# Patient Record
Sex: MALE | Race: BLACK OR AFRICAN AMERICAN | Employment: OTHER | URBAN - METROPOLITAN AREA
[De-identification: names, ages, dates, MRNs, and addresses within clinical notes are randomized per-mention and may not be internally consistent; named-entity substitution may affect disease eponyms.]

---

## 2017-09-05 ENCOUNTER — OFFICE VISIT (OUTPATIENT)
Dept: ORTHOPEDIC SURGERY | Age: 67
End: 2017-09-05

## 2017-09-05 VITALS — WEIGHT: 263 LBS | HEIGHT: 74 IN | RESPIRATION RATE: 16 BRPM | BODY MASS INDEX: 33.75 KG/M2

## 2017-09-05 DIAGNOSIS — R52 PAIN: ICD-10-CM

## 2017-09-05 DIAGNOSIS — M54.2 CERVICALGIA: Primary | ICD-10-CM

## 2017-09-05 PROCEDURE — 99203 OFFICE O/P NEW LOW 30 MIN: CPT | Performed by: PHYSICIAN ASSISTANT

## 2017-09-05 ASSESSMENT — ENCOUNTER SYMPTOMS
BACK PAIN: 1
RESPIRATORY NEGATIVE: 1
EYES NEGATIVE: 1
GASTROINTESTINAL NEGATIVE: 1

## 2017-10-11 PROBLEM — K26.4 GASTROINTESTINAL HEMORRHAGE ASSOCIATED WITH DUODENAL ULCER: Status: ACTIVE | Noted: 2017-10-11

## 2017-11-27 NOTE — ANESTHESIA PRE-OP
Department of Anesthesiology  Preprocedure Note       Name:  Jackson Li   Age:  79 y.o.  :  1950                                          MRN:  [id number]         Date:  2017      Surgeon:    Procedure:    Medications prior to admission:   Prior to Admission medications    Medication Sig Start Date End Date Taking? Authorizing Provider   HYDROcodone-acetaminophen (NORCO) 5-325 MG per tablet Take 1 tablet by mouth every 6 hours as needed for Pain . 17   Bryce Wade MD   bicalutamide (CASODEX) 50 MG chemo tablet Take 1 tablet by mouth daily 11/15/17   Bryce Wade MD   cloNIDine (CATAPRES) 0.1 MG tablet Take 0.1 mg by mouth 2 times daily    Historical Provider, MD   insulin lispro (HUMALOG) 100 UNIT/ML injection vial Inject 10 Units into the skin 3 times daily (with meals)  Patient taking differently: Inject into the skin See Admin Instructions 17 Patient states he uses sliding scale regimen for all meals 10/12/17   Yang Costa MD   tamsulosin (FLOMAX) 0.4 MG capsule Take 1 capsule by mouth daily 10/12/17   Yang Costa MD   insulin glargine (LANTUS) 100 UNIT/ML injection vial Inject 30 Units into the skin nightly. Patient taking differently: Inject 40 Units into the skin nightly 17 Patient states he gave himself 40 units about 3 am 14   Edward Dsouza MD       Current medications:    Current Outpatient Prescriptions   Medication Sig Dispense Refill    HYDROcodone-acetaminophen (NORCO) 5-325 MG per tablet Take 1 tablet by mouth every 6 hours as needed for Pain .  30 tablet 0    bicalutamide (CASODEX) 50 MG chemo tablet Take 1 tablet by mouth daily 90 tablet 0    cloNIDine (CATAPRES) 0.1 MG tablet Take 0.1 mg by mouth 2 times daily      insulin lispro (HUMALOG) 100 UNIT/ML injection vial Inject 10 Units into the skin 3 times daily (with meals) (Patient taking differently: Inject into the skin See Admin Instructions 17 Patient states he uses sliding scale Dental:          Pulmonary:Negative Pulmonary ROS and normal exam                               Cardiovascular:    (+) hypertension:, CAD:,       ECG reviewed               Beta Blocker:  Not on Beta Blocker      ROS comment: Do not find Dx of CAD in chart. Neuro/Psych:               GI/Hepatic/Renal:   (+) PUD, renal disease: CRI,           Endo/Other:    (+) Type II DM, , .                 Abdominal:           Vascular:                                  Anesthesia Plan      MAC     ASA 3       Induction: intravenous. Anesthetic plan and risks discussed with patient.                     Uriel Gan DO   11/27/2017

## 2017-11-28 ENCOUNTER — HOSPITAL ENCOUNTER (OUTPATIENT)
Dept: SURGERY | Age: 67
Discharge: HOME | End: 2017-11-28
Attending: INTERNAL MEDICINE | Admitting: INTERNAL MEDICINE

## 2017-11-28 VITALS
RESPIRATION RATE: 16 BRPM | TEMPERATURE: 99.9 F | OXYGEN SATURATION: 99 % | WEIGHT: 243 LBS | DIASTOLIC BLOOD PRESSURE: 82 MMHG | BODY MASS INDEX: 31.18 KG/M2 | HEIGHT: 74 IN | HEART RATE: 87 BPM | SYSTOLIC BLOOD PRESSURE: 132 MMHG

## 2017-11-28 LAB — GLUCOSE BLD-MCNC: 110 MG/DL (ref 70–99)

## 2017-11-28 RX ORDER — SODIUM CHLORIDE, SODIUM LACTATE, POTASSIUM CHLORIDE, CALCIUM CHLORIDE 600; 310; 30; 20 MG/100ML; MG/100ML; MG/100ML; MG/100ML
INJECTION, SOLUTION INTRAVENOUS CONTINUOUS
Status: DISCONTINUED | OUTPATIENT
Start: 2017-11-28 | End: 2017-11-29 | Stop reason: HOSPADM

## 2017-11-28 RX ORDER — PANTOPRAZOLE SODIUM 40 MG/1
40 TABLET, DELAYED RELEASE ORAL DAILY
Qty: 90 TABLET | Refills: 3 | Status: SHIPPED | OUTPATIENT
Start: 2017-11-28 | End: 2018-09-19

## 2017-11-28 RX ADMIN — SODIUM CHLORIDE, SODIUM LACTATE, POTASSIUM CHLORIDE, CALCIUM CHLORIDE: 600; 310; 30; 20 INJECTION, SOLUTION INTRAVENOUS at 08:20

## 2017-11-28 ASSESSMENT — PAIN - FUNCTIONAL ASSESSMENT: PAIN_FUNCTIONAL_ASSESSMENT: 0-10

## 2017-11-28 ASSESSMENT — PAIN SCALES - GENERAL
PAINLEVEL_OUTOF10: 0
PAINLEVEL_OUTOF10: 0

## 2017-11-28 NOTE — ANESTHESIA POST-OP
Anesthesia Post-op Note    Patient: Shereen Griffiths  MRN: [id number]  YOB: 1950  Date of evaluation: 11/28/2017  Time:  10:23 AM     Procedure(s) Performed: egd    Last Vitals: /70   Pulse 88   Temp 99.9 °F (37.7 °C) (Temporal)   Resp 16   Ht 6' 2\" (1.88 m)   Wt 243 lb (110.2 kg)   SpO2 100%   BMI 31.20 kg/m²     Chon Phase I: Chon Score: 10    Chon Phase II: Chon Score: 10    Anesthesia Post Evaluation    Final anesthesia type: MAC  Patient location during evaluation: procedure area  Patient participation: complete - patient participated  Level of consciousness: awake and alert  Pain score: 0  Airway patency: patent  Nausea & Vomiting: no nausea and no vomiting  Complications: no  Cardiovascular status: blood pressure returned to baseline  Respiratory status: acceptable  Hydration status: euvolemic        German Rios CRNA  10:23 AM

## 2017-11-28 NOTE — H&P
I have examined the patient within 24 hours before the procedure and there is no change in the history and physical exam  recorded by me previously. I have reviewed with the patient and/or family the risks, benefits, and alternatives to the procedure.     Suraj Manuel MD  [facility] gastroenterology  11/28/2017  8:10 AM

## 2017-11-28 NOTE — OP NOTE
Full Notes in [facility]    EGD esophagus normal  Stomach Mild pan gastritis  Biopsy done  Duodenum nodular duodenitis  Biopsy done    Plan:  PPI once a day  FU as OP in 2 weeks

## 2018-01-10 ENCOUNTER — HOSPITAL ENCOUNTER (OUTPATIENT)
Dept: OTHER | Age: 68
Discharge: HOME | End: 2018-01-10
Attending: INTERNAL MEDICINE | Admitting: INTERNAL MEDICINE

## 2018-01-10 PROBLEM — E87.5 HYPERKALEMIA: Status: ACTIVE | Noted: 2018-01-10

## 2018-01-10 LAB
ALBUMIN SERPL-MCNC: 3.8 GM/DL (ref 3.4–5)
ALP BLD-CCNC: 226 IU/L (ref 40–128)
ALT SERPL-CCNC: 11 U/L (ref 10–40)
ANION GAP SERPL CALCULATED.3IONS-SCNC: 15 MMOL/L (ref 4–16)
AST SERPL-CCNC: 15 IU/L (ref 15–37)
BILIRUB SERPL-MCNC: 0.3 MG/DL (ref 0–1)
BUN BLDV-MCNC: 39 MG/DL (ref 6–23)
CALCIUM SERPL-MCNC: 8.8 MG/DL (ref 8.3–10.6)
CHLORIDE BLD-SCNC: 107 MMOL/L (ref 99–110)
CO2: 20 MMOL/L (ref 21–32)
CREAT SERPL-MCNC: 3 MG/DL (ref 0.9–1.3)
GFR AFRICAN AMERICAN: 25 ML/MIN/1.73M2
GFR NON-AFRICAN AMERICAN: 21 ML/MIN/1.73M2
GLUCOSE BLD-MCNC: 89 MG/DL (ref 70–99)
POTASSIUM SERPL-SCNC: 6.6 MMOL/L (ref 3.5–5.1)
SODIUM BLD-SCNC: 142 MMOL/L (ref 135–145)
TOTAL PROTEIN: 6.8 GM/DL (ref 6.4–8.2)

## 2018-01-11 ENCOUNTER — HOSPITAL ENCOUNTER (OUTPATIENT)
Dept: INFUSION THERAPY | Age: 68
Discharge: HOME | End: 2018-02-09
Attending: INTERNAL MEDICINE | Admitting: INTERNAL MEDICINE

## 2018-01-11 PROBLEM — E43 SEVERE MALNUTRITION (HCC): Chronic | Status: ACTIVE | Noted: 2018-01-11

## 2018-01-11 RX ORDER — METHYLPREDNISOLONE SODIUM SUCCINATE 40 MG/ML
20 INJECTION, POWDER, LYOPHILIZED, FOR SOLUTION INTRAMUSCULAR; INTRAVENOUS ONCE
Status: DISCONTINUED | OUTPATIENT
Start: 2018-01-11 | End: 2018-01-11

## 2018-01-11 RX ORDER — SODIUM CHLORIDE 0.9 % (FLUSH) 0.9 %
10 SYRINGE (ML) INJECTION PRN
Status: DISCONTINUED | OUTPATIENT
Start: 2018-01-11 | End: 2018-01-11

## 2018-01-11 RX ORDER — ACETAMINOPHEN 325 MG/1
650 TABLET ORAL SEE ADMIN INSTRUCTIONS
Status: DISCONTINUED | OUTPATIENT
Start: 2018-01-11 | End: 2018-01-11

## 2018-01-11 RX ORDER — HEPARIN SODIUM (PORCINE) LOCK FLUSH IV SOLN 100 UNIT/ML 100 UNIT/ML
500 SOLUTION INTRAVENOUS PRN
Status: DISCONTINUED | OUTPATIENT
Start: 2018-01-11 | End: 2018-01-11

## 2018-01-11 RX ORDER — 0.9 % SODIUM CHLORIDE 0.9 %
250 INTRAVENOUS SOLUTION INTRAVENOUS ONCE
Status: DISCONTINUED | OUTPATIENT
Start: 2018-01-11 | End: 2018-01-11

## 2018-01-11 RX ORDER — DIPHENHYDRAMINE HYDROCHLORIDE 50 MG/ML
25 INJECTION INTRAMUSCULAR; INTRAVENOUS SEE ADMIN INSTRUCTIONS
Status: DISCONTINUED | OUTPATIENT
Start: 2018-01-11 | End: 2018-01-11

## 2018-01-31 ENCOUNTER — HOSPITAL ENCOUNTER (OUTPATIENT)
Dept: OTHER | Age: 68
Discharge: HOME | End: 2018-01-31
Attending: INTERNAL MEDICINE | Admitting: INTERNAL MEDICINE

## 2018-01-31 LAB
ALBUMIN SERPL-MCNC: 4.1 GM/DL (ref 3.4–5)
ALP BLD-CCNC: 214 IU/L (ref 40–129)
ALT SERPL-CCNC: 8 U/L (ref 10–40)
ANION GAP SERPL CALCULATED.3IONS-SCNC: 14 MMOL/L (ref 4–16)
AST SERPL-CCNC: 15 IU/L (ref 15–37)
BILIRUB SERPL-MCNC: 0.4 MG/DL (ref 0–1)
BUN BLDV-MCNC: 45 MG/DL (ref 6–23)
CALCIUM SERPL-MCNC: 9.2 MG/DL (ref 8.3–10.6)
CHLORIDE BLD-SCNC: 107 MMOL/L (ref 99–110)
CO2: 22 MMOL/L (ref 21–32)
CREAT SERPL-MCNC: 2.4 MG/DL (ref 0.9–1.3)
GFR AFRICAN AMERICAN: 33 ML/MIN/1.73M2
GFR NON-AFRICAN AMERICAN: 27 ML/MIN/1.73M2
GLUCOSE BLD-MCNC: 128 MG/DL (ref 70–99)
POTASSIUM SERPL-SCNC: 4.9 MMOL/L (ref 3.5–5.1)
SODIUM BLD-SCNC: 143 MMOL/L (ref 135–145)
TOTAL PROTEIN: 7.3 GM/DL (ref 6.4–8.2)

## 2018-02-06 ENCOUNTER — HOSPITAL ENCOUNTER (OUTPATIENT)
Dept: OTHER | Age: 68
Discharge: HOME | End: 2018-02-06
Attending: NURSE PRACTITIONER | Admitting: NURSE PRACTITIONER

## 2018-02-06 LAB
ALBUMIN SERPL-MCNC: 4 GM/DL (ref 3.4–5)
ALP BLD-CCNC: 203 IU/L (ref 40–129)
ALT SERPL-CCNC: 13 U/L (ref 10–40)
ANION GAP SERPL CALCULATED.3IONS-SCNC: 14 MMOL/L (ref 4–16)
AST SERPL-CCNC: 20 IU/L (ref 15–37)
BILIRUB SERPL-MCNC: 0.7 MG/DL (ref 0–1)
BUN BLDV-MCNC: 43 MG/DL (ref 6–23)
CALCIUM SERPL-MCNC: 8.9 MG/DL (ref 8.3–10.6)
CHLORIDE BLD-SCNC: 107 MMOL/L (ref 99–110)
CO2: 23 MMOL/L (ref 21–32)
CREAT SERPL-MCNC: 2.6 MG/DL (ref 0.9–1.3)
GFR AFRICAN AMERICAN: 30 ML/MIN/1.73M2
GFR NON-AFRICAN AMERICAN: 25 ML/MIN/1.73M2
GLUCOSE BLD-MCNC: 113 MG/DL (ref 70–99)
POTASSIUM SERPL-SCNC: 5 MMOL/L (ref 3.5–5.1)
SODIUM BLD-SCNC: 144 MMOL/L (ref 135–145)
TOTAL PROTEIN: 7.2 GM/DL (ref 6.4–8.2)

## 2018-02-14 PROBLEM — N18.9 ANEMIA IN CHRONIC KIDNEY DISEASE: Status: ACTIVE | Noted: 2018-02-14

## 2018-02-14 PROBLEM — D63.1 ANEMIA IN CHRONIC KIDNEY DISEASE: Status: ACTIVE | Noted: 2018-02-14

## 2018-02-20 ENCOUNTER — HOSPITAL ENCOUNTER (OUTPATIENT)
Dept: GENERAL RADIOLOGY | Age: 68
Discharge: HOME | End: 2018-02-20
Attending: NURSE PRACTITIONER | Admitting: NURSE PRACTITIONER

## 2018-02-20 ENCOUNTER — HOSPITAL ENCOUNTER (OUTPATIENT)
Dept: CT IMAGING | Age: 68
Discharge: HOME | End: 2018-02-20
Attending: INTERNAL MEDICINE | Admitting: INTERNAL MEDICINE

## 2018-02-20 DIAGNOSIS — C61 PROSTATE CA (HCC): ICD-10-CM

## 2018-02-20 DIAGNOSIS — C79.52 SECONDARY MALIGNANT NEOPLASM OF BONE AND BONE MARROW (HCC): ICD-10-CM

## 2018-02-20 DIAGNOSIS — C79.51 SECONDARY MALIGNANT NEOPLASM OF BONE AND BONE MARROW (HCC): ICD-10-CM

## 2018-02-20 DIAGNOSIS — C61 MALIGNANT NEOPLASM OF PROSTATE (HCC): ICD-10-CM

## 2018-02-20 LAB
ALBUMIN SERPL-MCNC: 3.9 GM/DL (ref 3.4–5)
ALP BLD-CCNC: 193 IU/L (ref 40–129)
ALT SERPL-CCNC: 13 U/L (ref 10–40)
ANION GAP SERPL CALCULATED.3IONS-SCNC: 12 MMOL/L (ref 4–16)
AST SERPL-CCNC: 21 IU/L (ref 15–37)
BASOPHILS ABSOLUTE: 0.1 K/CU MM
BASOPHILS RELATIVE PERCENT: 0.5 % (ref 0–1)
BILIRUB SERPL-MCNC: 0.4 MG/DL (ref 0–1)
BUN BLDV-MCNC: 36 MG/DL (ref 6–23)
CALCIUM SERPL-MCNC: 9 MG/DL (ref 8.3–10.6)
CHLORIDE BLD-SCNC: 105 MMOL/L (ref 99–110)
CO2: 25 MMOL/L (ref 21–32)
CREAT SERPL-MCNC: 2.2 MG/DL (ref 0.9–1.3)
DIFFERENTIAL TYPE: ABNORMAL
EOSINOPHILS ABSOLUTE: 0 K/CU MM
EOSINOPHILS RELATIVE PERCENT: 0.2 % (ref 0–3)
GFR AFRICAN AMERICAN: 36 ML/MIN/1.73M2
GFR NON-AFRICAN AMERICAN: 30 ML/MIN/1.73M2
GLUCOSE FASTING: 105 MG/DL (ref 70–99)
HCT VFR BLD CALC: 32.5 % (ref 42–52)
HEMOGLOBIN: 10 GM/DL (ref 13.5–18)
IMMATURE NEUTROPHIL %: 0.5 % (ref 0–0.43)
INR BLD: 1.13 INDEX
LYMPHOCYTES ABSOLUTE: 1.2 K/CU MM
LYMPHOCYTES RELATIVE PERCENT: 12.6 % (ref 24–44)
MCH RBC QN AUTO: 30.1 PG (ref 27–31)
MCHC RBC AUTO-ENTMCNC: 30.8 % (ref 32–36)
MCV RBC AUTO: 97.9 FL (ref 78–100)
MONOCYTES ABSOLUTE: 0.5 K/CU MM
MONOCYTES RELATIVE PERCENT: 5.6 % (ref 0–4)
NUCLEATED RBC %: 0.3 %
PDW BLD-RTO: 16.9 % (ref 11.7–14.9)
PLATELET # BLD: 265 K/CU MM (ref 140–440)
PMV BLD AUTO: 9.7 FL (ref 7.5–11.1)
POTASSIUM SERPL-SCNC: 5 MMOL/L (ref 3.5–5.1)
PROTHROMBIN TIME: 12.9 SECONDS (ref 9.12–12.5)
RBC # BLD: 3.32 M/CU MM (ref 4.6–6.2)
SEGMENTED NEUTROPHILS ABSOLUTE COUNT: 7.8 K/CU MM
SEGMENTED NEUTROPHILS RELATIVE PERCENT: 80.6 % (ref 36–66)
SODIUM BLD-SCNC: 142 MMOL/L (ref 135–145)
TOTAL IMMATURE NEUTOROPHIL: 0.05 K/CU MM
TOTAL NUCLEATED RBC: 0 K/CU MM
TOTAL PROTEIN: 7 GM/DL (ref 6.4–8.2)
WBC # BLD: 9.7 K/CU MM (ref 4–10.5)

## 2018-02-21 ENCOUNTER — HOSPITAL ENCOUNTER (OUTPATIENT)
Dept: OTHER | Age: 68
Discharge: HOME | End: 2018-02-21
Attending: INTERNAL MEDICINE | Admitting: INTERNAL MEDICINE

## 2018-02-21 LAB — PSA ULTRASENSITIVE: 1.62 NG/ML (ref 0–4)

## 2018-03-20 ENCOUNTER — HOSPITAL ENCOUNTER (OUTPATIENT)
Dept: OTHER | Age: 68
Discharge: HOME | End: 2018-03-20
Attending: INTERNAL MEDICINE | Admitting: INTERNAL MEDICINE

## 2018-03-20 LAB
ALBUMIN SERPL-MCNC: 3.8 GM/DL (ref 3.4–5)
ALP BLD-CCNC: 275 IU/L (ref 40–128)
ALT SERPL-CCNC: 54 U/L (ref 10–40)
ANION GAP SERPL CALCULATED.3IONS-SCNC: 14 MMOL/L (ref 4–16)
AST SERPL-CCNC: 32 IU/L (ref 15–37)
BILIRUB SERPL-MCNC: 0.6 MG/DL (ref 0–1)
BUN BLDV-MCNC: 35 MG/DL (ref 6–23)
CALCIUM SERPL-MCNC: 8.9 MG/DL (ref 8.3–10.6)
CHLORIDE BLD-SCNC: 104 MMOL/L (ref 99–110)
CO2: 25 MMOL/L (ref 21–32)
CREAT SERPL-MCNC: 2.2 MG/DL (ref 0.9–1.3)
GFR AFRICAN AMERICAN: 36 ML/MIN/1.73M2
GFR NON-AFRICAN AMERICAN: 30 ML/MIN/1.73M2
GLUCOSE BLD-MCNC: 115 MG/DL (ref 70–99)
POTASSIUM SERPL-SCNC: 4.7 MMOL/L (ref 3.5–5.1)
SODIUM BLD-SCNC: 143 MMOL/L (ref 135–145)
TOTAL PROTEIN: 7.1 GM/DL (ref 6.4–8.2)

## 2018-04-11 PROBLEM — N18.30 CHRONIC KIDNEY DISEASE, STAGE III (MODERATE) (HCC): Status: ACTIVE | Noted: 2018-04-11

## 2018-04-11 PROBLEM — C61 PROSTATE CANCER (HCC): Status: ACTIVE | Noted: 2018-04-11

## 2018-05-15 ENCOUNTER — HOSPITAL ENCOUNTER (OUTPATIENT)
Dept: OTHER | Age: 68
Discharge: HOME | End: 2018-05-15
Attending: INTERNAL MEDICINE | Admitting: INTERNAL MEDICINE

## 2018-05-15 LAB
ALBUMIN SERPL-MCNC: 3.9 GM/DL (ref 3.4–5)
ALP BLD-CCNC: 180 IU/L (ref 40–129)
ALT SERPL-CCNC: 15 U/L (ref 10–40)
ANION GAP SERPL CALCULATED.3IONS-SCNC: 10 MMOL/L (ref 4–16)
AST SERPL-CCNC: 18 IU/L (ref 15–37)
BILIRUB SERPL-MCNC: 0.3 MG/DL (ref 0–1)
BUN BLDV-MCNC: 34 MG/DL (ref 6–23)
CALCIUM SERPL-MCNC: 8.8 MG/DL (ref 8.3–10.6)
CHLORIDE BLD-SCNC: 109 MMOL/L (ref 99–110)
CO2: 27 MMOL/L (ref 21–32)
CREAT SERPL-MCNC: 2.3 MG/DL (ref 0.9–1.3)
GFR AFRICAN AMERICAN: 34 ML/MIN/1.73M2
GFR NON-AFRICAN AMERICAN: 28 ML/MIN/1.73M2
GLUCOSE BLD-MCNC: 144 MG/DL (ref 70–99)
POTASSIUM SERPL-SCNC: 4 MMOL/L (ref 3.5–5.1)
SODIUM BLD-SCNC: 146 MMOL/L (ref 135–145)
TOTAL PROTEIN: 6.6 GM/DL (ref 6.4–8.2)

## 2018-06-20 ENCOUNTER — HOSPITAL ENCOUNTER (OUTPATIENT)
Dept: OTHER | Age: 68
Discharge: HOME | End: 2018-06-20
Attending: INTERNAL MEDICINE | Admitting: INTERNAL MEDICINE

## 2018-06-20 LAB
ALBUMIN SERPL-MCNC: 4 GM/DL (ref 3.4–5)
ALP BLD-CCNC: 164 IU/L (ref 40–129)
ALT SERPL-CCNC: 13 U/L (ref 10–40)
ANION GAP SERPL CALCULATED.3IONS-SCNC: 12 MMOL/L (ref 4–16)
AST SERPL-CCNC: 16 IU/L (ref 15–37)
BILIRUB SERPL-MCNC: 0.3 MG/DL (ref 0–1)
BUN BLDV-MCNC: 31 MG/DL (ref 6–23)
CALCIUM SERPL-MCNC: 8.9 MG/DL (ref 8.3–10.6)
CHLORIDE BLD-SCNC: 105 MMOL/L (ref 99–110)
CO2: 27 MMOL/L (ref 21–32)
CREAT SERPL-MCNC: 2.1 MG/DL (ref 0.9–1.3)
GFR AFRICAN AMERICAN: 38 ML/MIN/1.73M2
GFR NON-AFRICAN AMERICAN: 32 ML/MIN/1.73M2
GLUCOSE BLD-MCNC: 101 MG/DL (ref 70–99)
POTASSIUM SERPL-SCNC: 4.5 MMOL/L (ref 3.5–5.1)
SODIUM BLD-SCNC: 144 MMOL/L (ref 135–145)
TOTAL PROTEIN: 7.4 GM/DL (ref 6.4–8.2)

## 2018-06-22 LAB
PROSTATE SPECIFIC ANTIGEN FREE: <0.1
PROSTATE SPECIFIC ANTIGEN PERCENT FREE: <50
PROSTATE SPECIFIC ANTIGEN: 0.2

## 2018-08-21 ENCOUNTER — HOSPITAL ENCOUNTER (OUTPATIENT)
Dept: OTHER | Age: 68
Discharge: HOME | End: 2018-08-21
Attending: NURSE PRACTITIONER | Admitting: NURSE PRACTITIONER

## 2018-08-21 LAB
ALBUMIN SERPL-MCNC: 4.2 GM/DL (ref 3.4–5)
ALP BLD-CCNC: 156 IU/L (ref 40–128)
ALT SERPL-CCNC: 16 U/L (ref 10–40)
ANION GAP SERPL CALCULATED.3IONS-SCNC: 13 MMOL/L (ref 4–16)
AST SERPL-CCNC: 22 IU/L (ref 15–37)
BILIRUB SERPL-MCNC: 0.4 MG/DL (ref 0–1)
BUN BLDV-MCNC: 37 MG/DL (ref 6–23)
CALCIUM SERPL-MCNC: 9.4 MG/DL (ref 8.3–10.6)
CHLORIDE BLD-SCNC: 104 MMOL/L (ref 99–110)
CO2: 27 MMOL/L (ref 21–32)
CREAT SERPL-MCNC: 2.3 MG/DL (ref 0.9–1.3)
GFR AFRICAN AMERICAN: 34 ML/MIN/1.73M2
GFR NON-AFRICAN AMERICAN: 28 ML/MIN/1.73M2
GLUCOSE BLD-MCNC: 110 MG/DL (ref 70–99)
POTASSIUM SERPL-SCNC: 5.1 MMOL/L (ref 3.5–5.1)
SODIUM BLD-SCNC: 144 MMOL/L (ref 135–145)
TOTAL PROTEIN: 6.9 GM/DL (ref 6.4–8.2)

## 2018-08-23 LAB
PROSTATE SPECIFIC ANTIGEN FREE: <0.1
PROSTATE SPECIFIC ANTIGEN PERCENT FREE: <100
PROSTATE SPECIFIC ANTIGEN: 0.1

## 2018-09-05 ENCOUNTER — HOSPITAL ENCOUNTER (OUTPATIENT)
Dept: GENERAL RADIOLOGY | Age: 68
Discharge: HOME | End: 2018-09-05
Attending: NURSE PRACTITIONER | Admitting: NURSE PRACTITIONER

## 2018-09-05 DIAGNOSIS — C61 PROSTATE CANCER (HCC): ICD-10-CM

## 2018-09-05 DIAGNOSIS — M25.552 LEFT HIP PAIN: ICD-10-CM

## 2018-09-19 PROBLEM — I73.9 PVD (PERIPHERAL VASCULAR DISEASE) (HCC): Status: ACTIVE | Noted: 2018-09-19

## 2018-09-20 ENCOUNTER — HOSPITAL ENCOUNTER (OUTPATIENT)
Dept: OTHER | Age: 68
Discharge: HOME | End: 2018-09-20
Attending: INTERNAL MEDICINE | Admitting: INTERNAL MEDICINE

## 2018-09-20 LAB
ALBUMIN SERPL-MCNC: 3.9 GM/DL (ref 3.4–5)
ALP BLD-CCNC: 143 IU/L (ref 40–129)
ALT SERPL-CCNC: 12 U/L (ref 10–40)
ANION GAP SERPL CALCULATED.3IONS-SCNC: 10 MMOL/L (ref 4–16)
AST SERPL-CCNC: 17 IU/L (ref 15–37)
BILIRUB SERPL-MCNC: 0.4 MG/DL (ref 0–1)
BUN BLDV-MCNC: 28 MG/DL (ref 6–23)
CALCIUM SERPL-MCNC: 8.9 MG/DL (ref 8.3–10.6)
CHLORIDE BLD-SCNC: 104 MMOL/L (ref 99–110)
CO2: 28 MMOL/L (ref 21–32)
CREAT SERPL-MCNC: 2.2 MG/DL (ref 0.9–1.3)
GFR AFRICAN AMERICAN: 36 ML/MIN/1.73M2
GFR NON-AFRICAN AMERICAN: 30 ML/MIN/1.73M2
GLUCOSE BLD-MCNC: 146 MG/DL (ref 70–99)
POTASSIUM SERPL-SCNC: 4.4 MMOL/L (ref 3.5–5.1)
SODIUM BLD-SCNC: 142 MMOL/L (ref 135–145)
TOTAL PROTEIN: 7.1 GM/DL (ref 6.4–8.2)

## 2018-11-05 ENCOUNTER — HOSPITAL ENCOUNTER (OUTPATIENT)
Age: 68
Setting detail: SPECIMEN
Discharge: HOME | End: 2018-11-05
Payer: COMMERCIAL

## 2018-11-05 LAB
ALBUMIN SERPL-MCNC: 4.4 GM/DL (ref 3.4–5)
ALP BLD-CCNC: 133 IU/L (ref 40–128)
ALT SERPL-CCNC: 17 U/L (ref 10–40)
ANION GAP SERPL CALCULATED.3IONS-SCNC: 13 MMOL/L (ref 4–16)
AST SERPL-CCNC: 20 IU/L (ref 15–37)
BILIRUB SERPL-MCNC: 0.3 MG/DL (ref 0–1)
BUN BLDV-MCNC: 46 MG/DL (ref 6–23)
CALCIUM SERPL-MCNC: 9.4 MG/DL (ref 8.3–10.6)
CHLORIDE BLD-SCNC: 102 MMOL/L (ref 99–110)
CO2: 25 MMOL/L (ref 21–32)
CREAT SERPL-MCNC: 2.5 MG/DL (ref 0.9–1.3)
GFR AFRICAN AMERICAN: 31 ML/MIN/1.73M2
GFR NON-AFRICAN AMERICAN: 26 ML/MIN/1.73M2
GLUCOSE BLD-MCNC: 121 MG/DL (ref 70–99)
POTASSIUM SERPL-SCNC: 5.1 MMOL/L (ref 3.5–5.1)
SODIUM BLD-SCNC: 140 MMOL/L (ref 135–145)
TOTAL PROTEIN: 7.6 GM/DL (ref 6.4–8.2)

## 2018-11-05 PROCEDURE — 84154 ASSAY OF PSA FREE: CPT

## 2018-11-05 PROCEDURE — 84153 ASSAY OF PSA TOTAL: CPT

## 2018-11-05 PROCEDURE — 80053 COMPREHEN METABOLIC PANEL: CPT

## 2018-12-19 ENCOUNTER — HOSPITAL ENCOUNTER (OUTPATIENT)
Age: 68
Setting detail: SPECIMEN
Discharge: HOME | End: 2018-12-19
Payer: COMMERCIAL

## 2018-12-19 LAB
ALBUMIN SERPL-MCNC: 4 GM/DL (ref 3.4–5)
ALP BLD-CCNC: 133 IU/L (ref 40–129)
ALT SERPL-CCNC: 15 U/L (ref 10–40)
ANION GAP SERPL CALCULATED.3IONS-SCNC: 14 MMOL/L (ref 4–16)
AST SERPL-CCNC: 21 IU/L (ref 15–37)
BILIRUB SERPL-MCNC: 0.3 MG/DL (ref 0–1)
BUN BLDV-MCNC: 47 MG/DL (ref 6–23)
CALCIUM SERPL-MCNC: 8.9 MG/DL (ref 8.3–10.6)
CHLORIDE BLD-SCNC: 101 MMOL/L (ref 99–110)
CO2: 25 MMOL/L (ref 21–32)
CREAT SERPL-MCNC: 2.7 MG/DL (ref 0.9–1.3)
GFR AFRICAN AMERICAN: 29 ML/MIN/1.73M2
GFR NON-AFRICAN AMERICAN: 24 ML/MIN/1.73M2
GLUCOSE BLD-MCNC: 131 MG/DL (ref 70–99)
POTASSIUM SERPL-SCNC: 4.6 MMOL/L (ref 3.5–5.1)
PSA ULTRASENSITIVE: 0.04 NG/ML (ref 0–4)
SODIUM BLD-SCNC: 140 MMOL/L (ref 135–145)
TOTAL PROTEIN: 7.3 GM/DL (ref 6.4–8.2)

## 2018-12-19 PROCEDURE — 80053 COMPREHEN METABOLIC PANEL: CPT

## 2018-12-19 PROCEDURE — 84153 ASSAY OF PSA TOTAL: CPT

## 2019-01-08 LAB
A/G RATIO: 1.2 (ref 1.1–2.2)
ALBUMIN SERPL-MCNC: 4.1 G/DL (ref 3.4–5)
ALP BLD-CCNC: 123 U/L (ref 40–129)
ALT SERPL-CCNC: 13 U/L (ref 10–40)
ANION GAP SERPL CALCULATED.3IONS-SCNC: 15 MMOL/L (ref 3–16)
AST SERPL-CCNC: 19 U/L (ref 15–37)
BILIRUB SERPL-MCNC: <0.2 MG/DL (ref 0–1)
BUN BLDV-MCNC: 48 MG/DL (ref 7–20)
CALCIUM SERPL-MCNC: 10 MG/DL (ref 8.3–10.6)
CHLORIDE BLD-SCNC: 103 MMOL/L (ref 99–110)
CHOLESTEROL, TOTAL: 199 MG/DL (ref 0–199)
CO2: 24 MMOL/L (ref 21–32)
CREAT SERPL-MCNC: 2.7 MG/DL (ref 0.8–1.3)
ESTIMATED AVERAGE GLUCOSE: 114 MG/DL
GFR AFRICAN AMERICAN: 28
GFR NON-AFRICAN AMERICAN: 24
GLOBULIN: 3.4 G/DL
GLUCOSE BLD-MCNC: 189 MG/DL (ref 70–99)
HBA1C MFR BLD: 5.6 %
HDLC SERPL-MCNC: 45 MG/DL (ref 40–60)
LDL CHOLESTEROL CALCULATED: 131 MG/DL
POTASSIUM SERPL-SCNC: 5 MMOL/L (ref 3.5–5.1)
SODIUM BLD-SCNC: 142 MMOL/L (ref 136–145)
TOTAL PROTEIN: 7.5 G/DL (ref 6.4–8.2)
TRIGL SERPL-MCNC: 117 MG/DL (ref 0–150)
VLDLC SERPL CALC-MCNC: 23 MG/DL

## 2019-03-13 ENCOUNTER — HOSPITAL ENCOUNTER (OUTPATIENT)
Age: 69
Setting detail: SPECIMEN
Discharge: HOME | End: 2019-03-13
Payer: COMMERCIAL

## 2019-03-13 LAB
ALBUMIN SERPL-MCNC: 3.9 GM/DL (ref 3.4–5)
ALP BLD-CCNC: 98 IU/L (ref 40–128)
ALT SERPL-CCNC: 8 U/L (ref 10–40)
ANION GAP SERPL CALCULATED.3IONS-SCNC: 10 MMOL/L (ref 4–16)
AST SERPL-CCNC: 15 IU/L (ref 15–37)
BILIRUB SERPL-MCNC: 0.4 MG/DL (ref 0–1)
BUN BLDV-MCNC: 31 MG/DL (ref 6–23)
CALCIUM SERPL-MCNC: 9.7 MG/DL (ref 8.3–10.6)
CHLORIDE BLD-SCNC: 102 MMOL/L (ref 99–110)
CO2: 29 MMOL/L (ref 21–32)
CREAT SERPL-MCNC: 2.4 MG/DL (ref 0.9–1.3)
GFR AFRICAN AMERICAN: 33 ML/MIN/1.73M2
GFR NON-AFRICAN AMERICAN: 27 ML/MIN/1.73M2
GLUCOSE BLD-MCNC: 135 MG/DL (ref 70–99)
POTASSIUM SERPL-SCNC: 4.7 MMOL/L (ref 3.5–5.1)
SODIUM BLD-SCNC: 141 MMOL/L (ref 135–145)
TOTAL PROTEIN: 6.9 GM/DL (ref 6.4–8.2)

## 2019-03-13 PROCEDURE — 84154 ASSAY OF PSA FREE: CPT

## 2019-03-13 PROCEDURE — 84153 ASSAY OF PSA TOTAL: CPT

## 2019-03-13 PROCEDURE — 80053 COMPREHEN METABOLIC PANEL: CPT

## 2019-03-15 LAB
PROSTATE SPECIFIC ANTIGEN FREE: <0.1 NG/ML
PROSTATE SPECIFIC ANTIGEN PERCENT FREE: NORMAL %
PROSTATE SPECIFIC ANTIGEN PERCENT FREE: NORMAL %
PROSTATE SPECIFIC ANTIGEN: <0.1 NG/ML (ref 0–4)
PROSTATE SPECIFIC ANTIGEN: NORMAL NG/ML (ref 0–4)

## 2019-06-12 ENCOUNTER — HOSPITAL ENCOUNTER (OUTPATIENT)
Age: 69
Setting detail: SPECIMEN
Discharge: HOME | End: 2019-06-12
Payer: COMMERCIAL

## 2019-06-12 LAB
ALBUMIN SERPL-MCNC: 4.4 GM/DL (ref 3.4–5)
ALP BLD-CCNC: 130 IU/L (ref 40–128)
ALT SERPL-CCNC: 15 U/L (ref 10–40)
ANION GAP SERPL CALCULATED.3IONS-SCNC: 14 MMOL/L (ref 4–16)
AST SERPL-CCNC: 21 IU/L (ref 15–37)
BILIRUB SERPL-MCNC: 0.4 MG/DL (ref 0–1)
BUN BLDV-MCNC: 40 MG/DL (ref 6–23)
CALCIUM SERPL-MCNC: 10.3 MG/DL (ref 8.3–10.6)
CHLORIDE BLD-SCNC: 101 MMOL/L (ref 99–110)
CO2: 27 MMOL/L (ref 21–32)
CREAT SERPL-MCNC: 2.3 MG/DL (ref 0.9–1.3)
GFR AFRICAN AMERICAN: 34 ML/MIN/1.73M2
GFR NON-AFRICAN AMERICAN: 28 ML/MIN/1.73M2
GLUCOSE BLD-MCNC: 101 MG/DL (ref 70–99)
POTASSIUM SERPL-SCNC: 4.6 MMOL/L (ref 3.5–5.1)
SODIUM BLD-SCNC: 142 MMOL/L (ref 135–145)
TOTAL PROTEIN: 7.4 GM/DL (ref 6.4–8.2)

## 2019-06-12 PROCEDURE — 84153 ASSAY OF PSA TOTAL: CPT

## 2019-06-12 PROCEDURE — 80053 COMPREHEN METABOLIC PANEL: CPT

## 2019-06-12 PROCEDURE — 84154 ASSAY OF PSA FREE: CPT

## 2019-09-27 ENCOUNTER — HOSPITAL ENCOUNTER (OUTPATIENT)
Age: 69
Setting detail: SPECIMEN
Discharge: HOME | End: 2019-09-27
Payer: COMMERCIAL

## 2019-09-27 LAB
ALBUMIN SERPL-MCNC: 4.2 GM/DL (ref 3.4–5)
ALP BLD-CCNC: 143 IU/L (ref 40–128)
ALT SERPL-CCNC: 13 U/L (ref 10–40)
ANION GAP SERPL CALCULATED.3IONS-SCNC: 10 MMOL/L (ref 4–16)
AST SERPL-CCNC: 20 IU/L (ref 15–37)
BILIRUB SERPL-MCNC: 0.3 MG/DL (ref 0–1)
BUN BLDV-MCNC: 34 MG/DL (ref 6–23)
CALCIUM SERPL-MCNC: 9.2 MG/DL (ref 8.3–10.6)
CHLORIDE BLD-SCNC: 102 MMOL/L (ref 99–110)
CO2: 26 MMOL/L (ref 21–32)
CREAT SERPL-MCNC: 2.2 MG/DL (ref 0.9–1.3)
GFR AFRICAN AMERICAN: 36 ML/MIN/1.73M2
GFR NON-AFRICAN AMERICAN: 30 ML/MIN/1.73M2
GLUCOSE BLD-MCNC: 111 MG/DL (ref 70–99)
POTASSIUM SERPL-SCNC: 4.3 MMOL/L (ref 3.5–5.1)
SODIUM BLD-SCNC: 138 MMOL/L (ref 135–145)
TOTAL PROTEIN: 7.4 GM/DL (ref 6.4–8.2)

## 2019-09-27 PROCEDURE — 84154 ASSAY OF PSA FREE: CPT

## 2019-09-27 PROCEDURE — 84153 ASSAY OF PSA TOTAL: CPT

## 2019-09-27 PROCEDURE — 80053 COMPREHEN METABOLIC PANEL: CPT

## 2019-12-27 ENCOUNTER — HOSPITAL ENCOUNTER (OUTPATIENT)
Age: 69
Setting detail: SPECIMEN
Discharge: HOME | End: 2019-12-27
Payer: COMMERCIAL

## 2019-12-27 LAB
ALBUMIN SERPL-MCNC: 4.4 GM/DL (ref 3.4–5)
ALP BLD-CCNC: 125 IU/L (ref 40–129)
ALT SERPL-CCNC: 8 U/L (ref 10–40)
ANION GAP SERPL CALCULATED.3IONS-SCNC: 10 MMOL/L (ref 4–16)
AST SERPL-CCNC: 14 IU/L (ref 15–37)
BILIRUB SERPL-MCNC: 0.4 MG/DL (ref 0–1)
BUN BLDV-MCNC: 29 MG/DL (ref 6–23)
CALCIUM SERPL-MCNC: 9.5 MG/DL (ref 8.3–10.6)
CHLORIDE BLD-SCNC: 98 MMOL/L (ref 99–110)
CO2: 27 MMOL/L (ref 21–32)
CREAT SERPL-MCNC: 2 MG/DL (ref 0.9–1.3)
GFR AFRICAN AMERICAN: 40 ML/MIN/1.73M2
GFR NON-AFRICAN AMERICAN: 33 ML/MIN/1.73M2
GLUCOSE BLD-MCNC: 90 MG/DL (ref 70–99)
POTASSIUM SERPL-SCNC: 4.3 MMOL/L (ref 3.5–5.1)
SODIUM BLD-SCNC: 135 MMOL/L (ref 135–145)
TOTAL PROTEIN: 7.7 GM/DL (ref 6.4–8.2)

## 2019-12-27 PROCEDURE — 80053 COMPREHEN METABOLIC PANEL: CPT

## 2019-12-27 PROCEDURE — 84153 ASSAY OF PSA TOTAL: CPT

## 2019-12-27 PROCEDURE — 84154 ASSAY OF PSA FREE: CPT

## 2020-01-21 ENCOUNTER — HOSPITAL ENCOUNTER (OUTPATIENT)
Dept: INFUSION THERAPY | Age: 70
Discharge: HOME | End: 2020-01-21
Payer: COMMERCIAL

## 2020-01-21 LAB
ALBUMIN SERPL-MCNC: 4.3 GM/DL (ref 3.4–5)
ALP BLD-CCNC: 142 IU/L (ref 40–128)
ALT SERPL-CCNC: 13 U/L (ref 10–40)
ANION GAP SERPL CALCULATED.3IONS-SCNC: 12 MMOL/L (ref 4–16)
AST SERPL-CCNC: 19 IU/L (ref 15–37)
BILIRUB SERPL-MCNC: 0.4 MG/DL (ref 0–1)
BUN BLDV-MCNC: 36 MG/DL (ref 6–23)
CALCIUM SERPL-MCNC: 9.4 MG/DL (ref 8.3–10.6)
CHLORIDE BLD-SCNC: 101 MMOL/L (ref 99–110)
CO2: 26 MMOL/L (ref 21–32)
CREAT SERPL-MCNC: 2.2 MG/DL (ref 0.9–1.3)
DIFFERENTIAL TYPE: ABNORMAL
EOSINOPHILS ABSOLUTE: 0.4 K/CU MM
EOSINOPHILS RELATIVE PERCENT: 4 % (ref 0–3)
GFR AFRICAN AMERICAN: 36 ML/MIN/1.73M2
GFR NON-AFRICAN AMERICAN: 30 ML/MIN/1.73M2
GLUCOSE BLD-MCNC: 117 MG/DL (ref 70–99)
HCT VFR BLD CALC: 38.5 % (ref 42–52)
HEMOGLOBIN: 12.3 GM/DL (ref 13.5–18)
LYMPHOCYTES ABSOLUTE: 1.5 K/CU MM
LYMPHOCYTES RELATIVE PERCENT: 17 % (ref 24–44)
MCH RBC QN AUTO: 30.8 PG (ref 27–31)
MCHC RBC AUTO-ENTMCNC: 31.9 % (ref 32–36)
MCV RBC AUTO: 96.5 FL (ref 78–100)
MONOCYTES ABSOLUTE: 0.6 K/CU MM
MONOCYTES RELATIVE PERCENT: 7 % (ref 0–4)
PDW BLD-RTO: 12.5 % (ref 11.7–14.9)
PLATELET # BLD: 238 K/CU MM (ref 140–440)
PMV BLD AUTO: 9 FL (ref 7.5–11.1)
POTASSIUM SERPL-SCNC: 4.9 MMOL/L (ref 3.5–5.1)
RBC # BLD: 3.99 M/CU MM (ref 4.6–6.2)
SEGMENTED NEUTROPHILS ABSOLUTE COUNT: 6.3 K/CU MM
SEGMENTED NEUTROPHILS RELATIVE PERCENT: 72 % (ref 36–66)
SODIUM BLD-SCNC: 139 MMOL/L (ref 135–145)
TOTAL PROTEIN: 7.7 GM/DL (ref 6.4–8.2)
WBC # BLD: 8.8 K/CU MM (ref 4–10.5)

## 2020-01-21 PROCEDURE — 84153 ASSAY OF PSA TOTAL: CPT

## 2020-01-21 PROCEDURE — 84154 ASSAY OF PSA FREE: CPT

## 2020-01-21 PROCEDURE — 80053 COMPREHEN METABOLIC PANEL: CPT

## 2020-01-21 PROCEDURE — 85025 COMPLETE CBC W/AUTO DIFF WBC: CPT

## 2020-01-21 PROCEDURE — 36415 COLL VENOUS BLD VENIPUNCTURE: CPT

## 2020-03-27 ENCOUNTER — HOSPITAL ENCOUNTER (OUTPATIENT)
Dept: INFUSION THERAPY | Age: 70
Discharge: HOME | End: 2020-03-27
Payer: COMMERCIAL

## 2020-03-27 LAB
ALBUMIN SERPL-MCNC: 3.9 GM/DL (ref 3.4–5)
ALP BLD-CCNC: 114 IU/L (ref 40–129)
ALT SERPL-CCNC: 10 U/L (ref 10–40)
ANION GAP SERPL CALCULATED.3IONS-SCNC: 10 MMOL/L (ref 4–16)
AST SERPL-CCNC: 16 IU/L (ref 15–37)
BASOPHILS ABSOLUTE: 0 K/CU MM
BASOPHILS RELATIVE PERCENT: 0.4 % (ref 0–1)
BILIRUB SERPL-MCNC: 0.3 MG/DL (ref 0–1)
BUN BLDV-MCNC: 26 MG/DL (ref 6–23)
CALCIUM SERPL-MCNC: 8.8 MG/DL (ref 8.3–10.6)
CHLORIDE BLD-SCNC: 101 MMOL/L (ref 99–110)
CO2: 27 MMOL/L (ref 21–32)
CREAT SERPL-MCNC: 1.9 MG/DL (ref 0.9–1.3)
DIFFERENTIAL TYPE: ABNORMAL
EOSINOPHILS ABSOLUTE: 0.3 K/CU MM
EOSINOPHILS RELATIVE PERCENT: 3.4 % (ref 0–3)
GFR AFRICAN AMERICAN: 43 ML/MIN/1.73M2
GFR NON-AFRICAN AMERICAN: 35 ML/MIN/1.73M2
GLUCOSE BLD-MCNC: 127 MG/DL (ref 70–99)
HCT VFR BLD CALC: 34.6 % (ref 42–52)
HEMOGLOBIN: 11 GM/DL (ref 13.5–18)
LYMPHOCYTES ABSOLUTE: 1.5 K/CU MM
LYMPHOCYTES RELATIVE PERCENT: 18.2 % (ref 24–44)
MCH RBC QN AUTO: 30.8 PG (ref 27–31)
MCHC RBC AUTO-ENTMCNC: 31.8 % (ref 32–36)
MCV RBC AUTO: 96.9 FL (ref 78–100)
MONOCYTES ABSOLUTE: 0.5 K/CU MM
MONOCYTES RELATIVE PERCENT: 6.2 % (ref 0–4)
PDW BLD-RTO: 12.4 % (ref 11.7–14.9)
PLATELET # BLD: 210 K/CU MM (ref 140–440)
PMV BLD AUTO: 8.7 FL (ref 7.5–11.1)
POTASSIUM SERPL-SCNC: 4.1 MMOL/L (ref 3.5–5.1)
RBC # BLD: 3.57 M/CU MM (ref 4.6–6.2)
SEGMENTED NEUTROPHILS ABSOLUTE COUNT: 6 K/CU MM
SEGMENTED NEUTROPHILS RELATIVE PERCENT: 71.8 % (ref 36–66)
SODIUM BLD-SCNC: 138 MMOL/L (ref 135–145)
TOTAL PROTEIN: 7 GM/DL (ref 6.4–8.2)
WBC # BLD: 8.3 K/CU MM (ref 4–10.5)

## 2020-03-27 PROCEDURE — 80053 COMPREHEN METABOLIC PANEL: CPT

## 2020-03-27 PROCEDURE — 85025 COMPLETE CBC W/AUTO DIFF WBC: CPT

## 2020-03-27 PROCEDURE — 36415 COLL VENOUS BLD VENIPUNCTURE: CPT

## 2020-03-27 PROCEDURE — 84154 ASSAY OF PSA FREE: CPT

## 2020-03-27 PROCEDURE — 84153 ASSAY OF PSA TOTAL: CPT

## 2020-05-29 ENCOUNTER — HOSPITAL ENCOUNTER (OUTPATIENT)
Dept: INFUSION THERAPY | Age: 70
Discharge: HOME | End: 2020-05-29
Payer: COMMERCIAL

## 2020-05-29 LAB
ALBUMIN SERPL-MCNC: 4.3 GM/DL (ref 3.4–5)
ALP BLD-CCNC: 113 IU/L (ref 40–128)
ALT SERPL-CCNC: 9 U/L (ref 10–40)
ANION GAP SERPL CALCULATED.3IONS-SCNC: 14 MMOL/L (ref 4–16)
AST SERPL-CCNC: 13 IU/L (ref 15–37)
BASOPHILS ABSOLUTE: 0 K/CU MM
BASOPHILS RELATIVE PERCENT: 0.2 % (ref 0–1)
BILIRUB SERPL-MCNC: 0.4 MG/DL (ref 0–1)
BUN BLDV-MCNC: 49 MG/DL (ref 6–23)
CALCIUM SERPL-MCNC: 9.8 MG/DL (ref 8.3–10.6)
CHLORIDE BLD-SCNC: 104 MMOL/L (ref 99–110)
CO2: 23 MMOL/L (ref 21–32)
CREAT SERPL-MCNC: 2.8 MG/DL (ref 0.9–1.3)
DIFFERENTIAL TYPE: ABNORMAL
EOSINOPHILS ABSOLUTE: 0.2 K/CU MM
EOSINOPHILS RELATIVE PERCENT: 2.6 % (ref 0–3)
GFR AFRICAN AMERICAN: 27 ML/MIN/1.73M2
GFR NON-AFRICAN AMERICAN: 23 ML/MIN/1.73M2
GLUCOSE BLD-MCNC: 128 MG/DL (ref 70–99)
HCT VFR BLD CALC: 36.4 % (ref 42–52)
HEMOGLOBIN: 11.7 GM/DL (ref 13.5–18)
LYMPHOCYTES ABSOLUTE: 1.5 K/CU MM
LYMPHOCYTES RELATIVE PERCENT: 17.5 % (ref 24–44)
MCH RBC QN AUTO: 31 PG (ref 27–31)
MCHC RBC AUTO-ENTMCNC: 32.1 % (ref 32–36)
MCV RBC AUTO: 96.3 FL (ref 78–100)
MONOCYTES ABSOLUTE: 0.7 K/CU MM
MONOCYTES RELATIVE PERCENT: 7.6 % (ref 0–4)
PDW BLD-RTO: 12.7 % (ref 11.7–14.9)
PLATELET # BLD: 228 K/CU MM (ref 140–440)
PMV BLD AUTO: 9 FL (ref 7.5–11.1)
POTASSIUM SERPL-SCNC: 4.6 MMOL/L (ref 3.5–5.1)
PSA ULTRASENSITIVE: 0.01 NG/ML (ref 0–4)
RBC # BLD: 3.78 M/CU MM (ref 4.6–6.2)
SEGMENTED NEUTROPHILS ABSOLUTE COUNT: 6.2 K/CU MM
SEGMENTED NEUTROPHILS RELATIVE PERCENT: 72.1 % (ref 36–66)
SODIUM BLD-SCNC: 141 MMOL/L (ref 135–145)
TOTAL PROTEIN: 7.2 GM/DL (ref 6.4–8.2)
WBC # BLD: 8.5 K/CU MM (ref 4–10.5)

## 2020-05-29 PROCEDURE — 6360000002 HC RX W HCPCS: Performed by: INTERNAL MEDICINE

## 2020-05-29 PROCEDURE — 36415 COLL VENOUS BLD VENIPUNCTURE: CPT

## 2020-05-29 PROCEDURE — 96402 CHEMO HORMON ANTINEOPL SQ/IM: CPT

## 2020-05-29 PROCEDURE — 84153 ASSAY OF PSA TOTAL: CPT

## 2020-05-29 PROCEDURE — 80053 COMPREHEN METABOLIC PANEL: CPT

## 2020-05-29 PROCEDURE — 85025 COMPLETE CBC W/AUTO DIFF WBC: CPT

## 2020-06-01 ENCOUNTER — HOSPITAL ENCOUNTER (OUTPATIENT)
Dept: INFUSION THERAPY | Age: 70
Discharge: HOME | End: 2020-06-01
Payer: COMMERCIAL

## 2020-06-01 LAB
ANION GAP SERPL CALCULATED.3IONS-SCNC: 9 MMOL/L (ref 4–16)
BUN BLDV-MCNC: 39 MG/DL (ref 6–23)
CALCIUM SERPL-MCNC: 8.8 MG/DL (ref 8.3–10.6)
CHLORIDE BLD-SCNC: 108 MMOL/L (ref 99–110)
CO2: 26 MMOL/L (ref 21–32)
CREAT SERPL-MCNC: 2.4 MG/DL (ref 0.9–1.3)
GFR AFRICAN AMERICAN: 33 ML/MIN/1.73M2
GFR NON-AFRICAN AMERICAN: 27 ML/MIN/1.73M2
GLUCOSE BLD-MCNC: 127 MG/DL (ref 70–99)
POTASSIUM SERPL-SCNC: 4.5 MMOL/L (ref 3.5–5.1)
SODIUM BLD-SCNC: 143 MMOL/L (ref 135–145)

## 2020-06-01 PROCEDURE — 96361 HYDRATE IV INFUSION ADD-ON: CPT

## 2020-06-01 PROCEDURE — 80048 BASIC METABOLIC PNL TOTAL CA: CPT

## 2020-06-01 PROCEDURE — 96360 HYDRATION IV INFUSION INIT: CPT

## 2020-06-01 PROCEDURE — 36415 COLL VENOUS BLD VENIPUNCTURE: CPT

## 2020-06-01 PROCEDURE — 2580000003 HC RX 258: Performed by: INTERNAL MEDICINE

## 2020-06-01 RX ORDER — 0.9 % SODIUM CHLORIDE 0.9 %
1000 INTRAVENOUS SOLUTION INTRAVENOUS ONCE
Status: DISCONTINUED | OUTPATIENT
Start: 2020-06-01 | End: 2020-06-02 | Stop reason: HOSPADM

## 2020-07-27 ENCOUNTER — HOSPITAL ENCOUNTER (OUTPATIENT)
Dept: INFUSION THERAPY | Age: 70
Discharge: HOME | End: 2020-07-27
Payer: COMMERCIAL

## 2020-07-27 LAB
ALBUMIN SERPL-MCNC: 4.1 GM/DL (ref 3.4–5)
ALP BLD-CCNC: 111 IU/L (ref 40–128)
ALT SERPL-CCNC: 9 U/L (ref 10–40)
ANION GAP SERPL CALCULATED.3IONS-SCNC: 11 MMOL/L (ref 4–16)
AST SERPL-CCNC: 15 IU/L (ref 15–37)
BASOPHILS ABSOLUTE: 0 K/CU MM
BASOPHILS RELATIVE PERCENT: 0.1 % (ref 0–1)
BILIRUB SERPL-MCNC: 0.3 MG/DL (ref 0–1)
BUN BLDV-MCNC: 41 MG/DL (ref 6–23)
CALCIUM SERPL-MCNC: 9.4 MG/DL (ref 8.3–10.6)
CHLORIDE BLD-SCNC: 102 MMOL/L (ref 99–110)
CO2: 28 MMOL/L (ref 21–32)
CREAT SERPL-MCNC: 2.6 MG/DL (ref 0.9–1.3)
DIFFERENTIAL TYPE: ABNORMAL
EOSINOPHILS ABSOLUTE: 0.3 K/CU MM
EOSINOPHILS RELATIVE PERCENT: 2.6 % (ref 0–3)
GFR AFRICAN AMERICAN: 30 ML/MIN/1.73M2
GFR NON-AFRICAN AMERICAN: 25 ML/MIN/1.73M2
GLUCOSE BLD-MCNC: 115 MG/DL (ref 70–99)
HCT VFR BLD CALC: 34.7 % (ref 42–52)
HEMOGLOBIN: 11.2 GM/DL (ref 13.5–18)
LYMPHOCYTES ABSOLUTE: 2 K/CU MM
LYMPHOCYTES RELATIVE PERCENT: 20.6 % (ref 24–44)
MCH RBC QN AUTO: 31.2 PG (ref 27–31)
MCHC RBC AUTO-ENTMCNC: 32.3 % (ref 32–36)
MCV RBC AUTO: 96.7 FL (ref 78–100)
MONOCYTES ABSOLUTE: 0.6 K/CU MM
MONOCYTES RELATIVE PERCENT: 6.4 % (ref 0–4)
PDW BLD-RTO: 13 % (ref 11.7–14.9)
PLATELET # BLD: 225 K/CU MM (ref 140–440)
PMV BLD AUTO: 9.1 FL (ref 7.5–11.1)
POTASSIUM SERPL-SCNC: 4.5 MMOL/L (ref 3.5–5.1)
RBC # BLD: 3.59 M/CU MM (ref 4.6–6.2)
SEGMENTED NEUTROPHILS ABSOLUTE COUNT: 6.9 K/CU MM
SEGMENTED NEUTROPHILS RELATIVE PERCENT: 70.3 % (ref 36–66)
SODIUM BLD-SCNC: 141 MMOL/L (ref 135–145)
TOTAL PROTEIN: 7.3 GM/DL (ref 6.4–8.2)
WBC # BLD: 9.8 K/CU MM (ref 4–10.5)

## 2020-07-27 PROCEDURE — 85025 COMPLETE CBC W/AUTO DIFF WBC: CPT

## 2020-07-27 PROCEDURE — 80053 COMPREHEN METABOLIC PANEL: CPT

## 2020-07-27 PROCEDURE — 36415 COLL VENOUS BLD VENIPUNCTURE: CPT

## 2020-07-27 PROCEDURE — 99211 OFF/OP EST MAY X REQ PHY/QHP: CPT

## 2020-09-21 ENCOUNTER — TELEPHONE (OUTPATIENT)
Dept: ONCOLOGY | Age: 70
End: 2020-09-21

## 2020-09-21 RX ORDER — OXYCODONE HYDROCHLORIDE AND ACETAMINOPHEN 5; 325 MG/1; MG/1
1 TABLET ORAL EVERY 6 HOURS PRN
Qty: 56 TABLET | Refills: 0 | Status: SHIPPED | OUTPATIENT
Start: 2020-09-21 | End: 2020-10-05

## 2020-09-21 NOTE — TELEPHONE ENCOUNTER
Pt called to state that he is back in [place]. He stated that he ran out of pain medication and is in excruciating pain in his left hip that goes all the way to his knee. He would like Dr Alice Alves to order an Xray or appropriate test to determine what the issue is. He stated that current pain medication was not working to control the pain. Dr Alice Alves notified and ordered MRI and change in pain medication. Pt notified.

## 2020-09-30 ENCOUNTER — HOSPITAL ENCOUNTER (OUTPATIENT)
Dept: MRI IMAGING | Age: 70
Discharge: HOME | End: 2020-09-30
Payer: COMMERCIAL

## 2020-09-30 LAB
GFR AFRICAN AMERICAN: 32 ML/MIN/1.73M2
GFR NON-AFRICAN AMERICAN: 27 ML/MIN/1.73M2
POC CREATININE: 2.4 MG/DL (ref 0.9–1.3)

## 2020-09-30 PROCEDURE — 6360000004 HC RX CONTRAST MEDICATION: Performed by: INTERNAL MEDICINE

## 2020-09-30 PROCEDURE — A9577 INJ MULTIHANCE: HCPCS | Performed by: INTERNAL MEDICINE

## 2020-09-30 PROCEDURE — 73723 MRI JOINT LWR EXTR W/O&W/DYE: CPT

## 2020-09-30 RX ADMIN — GADOBENATE DIMEGLUMINE 10 ML: 529 INJECTION, SOLUTION INTRAVENOUS at 10:21

## 2020-10-01 ENCOUNTER — TELEPHONE (OUTPATIENT)
Dept: ONCOLOGY | Age: 70
End: 2020-10-01

## 2020-10-01 RX ORDER — DOCUSATE SODIUM 100 MG/1
100 CAPSULE, LIQUID FILLED ORAL DAILY PRN
Qty: 30 CAPSULE | Refills: 0 | Status: SHIPPED | OUTPATIENT
Start: 2020-10-01 | End: 2022-09-09

## 2020-10-01 NOTE — TELEPHONE ENCOUNTER
Patient called states he is have trouble w/hard stools and several yrs ago an Rx for Soft Stool was sent in for him, he would like that sent in again to [facility], please advise

## 2020-10-12 ENCOUNTER — HOSPITAL ENCOUNTER (OUTPATIENT)
Dept: INFUSION THERAPY | Age: 70
Discharge: HOME | End: 2020-10-12
Payer: COMMERCIAL

## 2020-10-12 ENCOUNTER — OFFICE VISIT (OUTPATIENT)
Dept: ONCOLOGY | Age: 70
End: 2020-10-12
Payer: COMMERCIAL

## 2020-10-12 VITALS
HEART RATE: 100 BPM | WEIGHT: 234 LBS | TEMPERATURE: 97.5 F | BODY MASS INDEX: 30.03 KG/M2 | OXYGEN SATURATION: 96 % | HEIGHT: 74 IN | DIASTOLIC BLOOD PRESSURE: 89 MMHG | SYSTOLIC BLOOD PRESSURE: 141 MMHG

## 2020-10-12 DIAGNOSIS — C61 PROSTATE CANCER (HCC): Primary | ICD-10-CM

## 2020-10-12 LAB
ALBUMIN SERPL-MCNC: 4.3 GM/DL (ref 3.4–5)
ALP BLD-CCNC: 141 IU/L (ref 40–128)
ALT SERPL-CCNC: 13 U/L (ref 10–40)
ANION GAP SERPL CALCULATED.3IONS-SCNC: 11 MMOL/L (ref 4–16)
AST SERPL-CCNC: 19 IU/L (ref 15–37)
BASOPHILS ABSOLUTE: 0 K/CU MM
BASOPHILS RELATIVE PERCENT: 0.3 % (ref 0–1)
BILIRUB SERPL-MCNC: 0.4 MG/DL (ref 0–1)
BUN BLDV-MCNC: 32 MG/DL (ref 6–23)
CALCIUM SERPL-MCNC: 9.1 MG/DL (ref 8.3–10.6)
CHLORIDE BLD-SCNC: 105 MMOL/L (ref 99–110)
CO2: 26 MMOL/L (ref 21–32)
CREAT SERPL-MCNC: 2.3 MG/DL (ref 0.9–1.3)
DIFFERENTIAL TYPE: ABNORMAL
EOSINOPHILS ABSOLUTE: 0.3 K/CU MM
EOSINOPHILS RELATIVE PERCENT: 2.6 % (ref 0–3)
GFR AFRICAN AMERICAN: 34 ML/MIN/1.73M2
GFR NON-AFRICAN AMERICAN: 28 ML/MIN/1.73M2
GLUCOSE BLD-MCNC: 107 MG/DL (ref 70–99)
HCT VFR BLD CALC: 35.8 % (ref 42–52)
HEMOGLOBIN: 11.3 GM/DL (ref 13.5–18)
LYMPHOCYTES ABSOLUTE: 1.9 K/CU MM
LYMPHOCYTES RELATIVE PERCENT: 17.7 % (ref 24–44)
MCH RBC QN AUTO: 30.8 PG (ref 27–31)
MCHC RBC AUTO-ENTMCNC: 31.6 % (ref 32–36)
MCV RBC AUTO: 97.5 FL (ref 78–100)
MONOCYTES ABSOLUTE: 0.7 K/CU MM
MONOCYTES RELATIVE PERCENT: 6.2 % (ref 0–4)
PDW BLD-RTO: 12.9 % (ref 11.7–14.9)
PLATELET # BLD: 245 K/CU MM (ref 140–440)
PMV BLD AUTO: 9.1 FL (ref 7.5–11.1)
POTASSIUM SERPL-SCNC: 5.1 MMOL/L (ref 3.5–5.1)
RBC # BLD: 3.67 M/CU MM (ref 4.6–6.2)
SEGMENTED NEUTROPHILS ABSOLUTE COUNT: 8 K/CU MM
SEGMENTED NEUTROPHILS RELATIVE PERCENT: 73.2 % (ref 36–66)
SODIUM BLD-SCNC: 142 MMOL/L (ref 135–145)
TOTAL PROTEIN: 7 GM/DL (ref 6.4–8.2)
WBC # BLD: 10.9 K/CU MM (ref 4–10.5)

## 2020-10-12 PROCEDURE — 84153 ASSAY OF PSA TOTAL: CPT

## 2020-10-12 PROCEDURE — 36415 COLL VENOUS BLD VENIPUNCTURE: CPT

## 2020-10-12 PROCEDURE — 96372 THER/PROPH/DIAG INJ SC/IM: CPT

## 2020-10-12 PROCEDURE — 85025 COMPLETE CBC W/AUTO DIFF WBC: CPT

## 2020-10-12 PROCEDURE — 84154 ASSAY OF PSA FREE: CPT

## 2020-10-12 PROCEDURE — 80053 COMPREHEN METABOLIC PANEL: CPT

## 2020-10-12 PROCEDURE — 99214 OFFICE O/P EST MOD 30 MIN: CPT | Performed by: INTERNAL MEDICINE

## 2020-10-12 PROCEDURE — 6360000002 HC RX W HCPCS: Performed by: INTERNAL MEDICINE

## 2020-10-12 RX ADMIN — LEUPROLIDE ACETATE 22.5 MG: KIT SUBCUTANEOUS at 15:11

## 2020-10-12 ASSESSMENT — PATIENT HEALTH QUESTIONNAIRE - PHQ9
2. FEELING DOWN, DEPRESSED OR HOPELESS: 1
SUM OF ALL RESPONSES TO PHQ QUESTIONS 1-9: 1
1. LITTLE INTEREST OR PLEASURE IN DOING THINGS: 0
SUM OF ALL RESPONSES TO PHQ QUESTIONS 1-9: 1
SUM OF ALL RESPONSES TO PHQ9 QUESTIONS 1 & 2: 1

## 2020-10-12 NOTE — PROGRESS NOTES
Patient Name: Madeline Santo  Patient : 1950  Patient MRN: [id number]     Primary Oncologist: Oapl Crump MD  Referring Physician: Karmen Arambula MD, Maxime Ardon   Urologist: Dr Derrick eNwman       Date of Service: 10/12/2020      Chief Complaint:   Chief Complaint   Patient presents with    Follow-up        Active Problem list  1. Prostate cancer [facility])           HPI:        Elderly  male admitted for abnormal labs found to be in a acute renal failure with hyperkalemia 2017 With a creatinine of 7.5 CT AP shows evidence of metastatic disease in the abdomen and lungs. A vazquez was placed and he has been hydrated. Renal and urology are on board. Has some right shoulder pain. Lost 20 lbs in the past few months. ONC history  Hx of prostate cancer s/p XRT 2009 Treated at [facility]  October 10, 2017 showed multiple pleural-based lung nodules of bilateral lung bases with the largest measuring 1.2 cm of posterior right lung base new since 2014. 2017 admission for hyperkalemia acute renal failure, CT head negative for intracranial abnormality. CT abdomen pelvis showed multiple lung base pulmonary nodules. Blastic lesions within the visualized thoracic and lumbar spine. Findings concerning for metastatic disease. Progressive bilateral hydroureteronephrosis without evidence for obstructing calculus  November 10, 2017 bone scan showed multifocal axial and appendicular skeletal disease. Casodex was started. PSA was 112.3  2017 Bone biopsy showed metastatic adenocarcinoma the prostate Based on L5 bone biopsy   saw DR Susan Galloway)   lupron started  dec 2017 s/p EGD essentially normal  dec 13,2017 started cycle #1 docetaxel, received 3C until  Admitted for hyperkalemia, underwent a temporary hemodialysis, PSA was 3  Then placed on Zytiga until  CT CAP showed Osteoblastic skeletal metastatic disease.  Tiny pulmonary nodules are almost certainly postinflammatory. No definite  findings of intrathoracic or intra abdominopelvic metastatic disease and he also developed some neuropathy in his left 5th digit, PSA < 2   june 2018 = 0.1  August 2018 he started taking Xtandi  august 2018 = 0.1  december 2018 PSA 0.04   3-19 pSA 0.01  6-19 PSA < 0.1   9-19 PSA < 0.1  12/27/2019: PSA <0.1    9/30/20: MRI Hip:  Marrow replacing osseous lesions seen in the bilateral superior acetabulum    and left iliac bone suspicous for osseous metastases.  Lesions in the left    superior acetabulum and left iliac bone are included in the field of view on    the post-contrast sequences and demonstrate heterogeneous enhancement.  There    is an additional peripherally sclerotic lesion in the left subtrochanteric    proximal femur without definite internal enhancement but demonstrates    peripheral enhancement.  These findings could be further assessed with a    nuclear medicine bone scan.         Severe left hip joint osteoarthrosis with bony remodeling of the femoral    head.  There is a large left hip joint effusion with a lobular T2    hyperintense lesion at the posterosuperior margin of the left hip joint. This appears chronic in nature and could represent synovial    proliferation/synovitis.  This would have an atypical appearance for    metastatic disease.         Generalized edema in the left adductor, obturator, and gluteus minimus    musculature.         Fluid bright signal in the region of the quadratus femoris muscle could    represent ischiofemoral bursitis. PMH: CAD, HTN, HLP, DM, CKD    PSH: upper and lower endoscopy    SH: Lives alone. Three boys. Quit smoking long time ago but started smoking 1PP two and half days. Prior to quitting in 2014 he smoked 1-2 ppd for 40 years. Also h/O ETOH abuse. No other illicit drug abuse    FH: Whole family , his father had 8 sibling and all had cancer, does know what cancer.  Has a brother and sister but no cancer diagnosis. Interval History  10/12/2020:Arrived alone to the clinic. Reported that he continues to have pain in the left knee radiating to the left hip, no pain when he sits on the recliner, driving. But pain with ambulation. Uses Walker to ambulate. No back pain. Appetite is preserved but los 6 lbs, eating healthy during this pandemic. No chest pain, increased sob, palpitations or any dizziness. No abdominal pain, nausea, emesis,diarrhea or any constipation. No  sx. No LE edema.      Review of Systems   Per interval history; otherwise 10 point ROS is negative              Vital Signs:  BP (!) 141/89 (Site: Right Upper Arm, Position: Sitting, Cuff Size: Large Adult)   Pulse 100   Temp 97.5 °F (36.4 °C) (Temporal)   Ht 6' 2\" (1.88 m)   Wt 234 lb (106.1 kg)   SpO2 96%   BMI 30.04 kg/m²     Physical Exam:    CONSTITUTIONAL: awake, alert, tired aapearing  EYES: No palor or any icterus  ENT: ATNC  NECK: No JVD  HEMATOLOGIC/LYMPHATIC: no cervical, supraclavicular or axillary lymphadenopathy   LUNGS: CTAB  CARDIOVASCULAR: s1s2 rrr  ABDOMEN: normal bowel sounds x 4, soft, non-distended, non-tender, no masses palpated, no hepatosplenomegaly   MUSCULOSKELETAL: left knee swelling  NEUROLOGIC: GI  SKIN: No rash  EXTREMITIES: Left knee edema, ttp       Labs:    Hematology:  Lab Results   Component Value Date    WBC 9.8 07/27/2020    RBC 3.59 (L) 07/27/2020    HGB 11.2 (L) 07/27/2020    HCT 34.7 (L) 07/27/2020    MCV 96.7 07/27/2020    MCH 31.2 (H) 07/27/2020    MCHC 32.3 07/27/2020    RDW 13.0 07/27/2020     07/27/2020    MPV 9.1 07/27/2020    BANDSPCT 4 (L) 10/08/2017    SEGSPCT 70.3 (H) 07/27/2020    EOSRELPCT 2.6 07/27/2020    BASOPCT 0.1 07/27/2020    LYMPHOPCT 20.6 (L) 07/27/2020    MONOPCT 6.4 (H) 07/27/2020    BANDABS 0.64 10/08/2017    SEGSABS 6.9 07/27/2020    EOSABS 0.3 07/27/2020    BASOSABS 0.0 07/27/2020    LYMPHSABS 2.0 07/27/2020    MONOSABS 0.6 07/27/2020 to aid in the prognosis and management of prostate cancer   patients. Elevated PSA concentrations can only suggest the   presence of prostate cancer until biopsy is performed. PSA   concentrations can also be elevated in benign prostatic   hyperplasia or inflammatory conditions of the prostate. PSA is   generally not elevated in healthy men or men with non-prostatic   carcinoma. Imaging: Reviewed     Pathology:Reviewed     Observations:  Performance Status: ECOG 1  Depression Status: PHQ-9 Total Score: 1 (10/12/2020  2:20 PM)          Assessment & Plan:  80 yo male admitted for ARF and hyperkalemia found to Bilateral hydroureteronephrosis and pulmonary nodules and blastic lesions within the thoracic and lumbar spine With metastatic prostate cancer Found on biopsy of L5 with .3 with most recent PSA in may 2020 <.01  -PSA October pending  -consider XRT if having bone pain vs radium 223, he defers  -smoking still   -NCCN guidelines reviewed  -CT CAP feb 2018 shows osseous disease, MRI left hip October 2020 with osseous disease and left hip effusion. Defers ortho follow up. .   -cont to enzalumatide + prednisone  -PSA  Pending from today    Anemia: NC. Hb stable. CBC pending. 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 Ambrosio     HTN  -PCP following    Continue other medical care    Discussed the above findings and plan with him and he verbalized understanding    Recommend follow up with PCP and other specialists    Discussed healthy LS, helathy diet and recommend being uptodate with age appropriate screening tools    RTC feb 2021 or earlier if new sx    I have recommended that the patient follow CDC guidelines for prevention of COVID-19 infection.     [address]           Electronically signed by Antony Quintana MD on 10/12/2020 at 2:35 PM

## 2020-10-12 NOTE — PROGRESS NOTES
Pt. Here for injection following office visit and lab work. Injection administered as ordered without difficulty, pt. Tolerated well.

## 2020-10-12 NOTE — PROGRESS NOTES
MA Rooming Questions  Patient: Antoinette Padron  MRN: [id number]    Date: 10/12/2020        1. Do you have any new issues? yes - discomfort in left knee going into pelvis while walking          2. Do you need any refills on medications?    no    3. Have you had any imaging done since your last visit? yes - MRI    4. Have you been hospitalized or seen in the emergency room since your last visit here?   no    5. Did the patient have a depression screening completed today?  Yes    PHQ-9 Total Score: 1 (10/12/2020  2:20 PM)       PHQ-9 Given to (if applicable):               PHQ-9 Score (if applicable):                     [] Positive     []  Negative              Does question #9 need addressed (if applicable)                     [] Yes    []  No               Jayce Johnson

## 2020-10-15 ENCOUNTER — TELEPHONE (OUTPATIENT)
Dept: ONCOLOGY | Age: 70
End: 2020-10-15

## 2020-10-15 LAB
PROSTATE SPECIFIC ANTIGEN FREE: <0.1 NG/ML
PROSTATE SPECIFIC ANTIGEN PERCENT FREE: NORMAL %
PROSTATE SPECIFIC ANTIGEN: <0.1 NG/ML (ref 0–4)

## 2020-10-15 NOTE — TELEPHONE ENCOUNTER
NN returned call to pt to discuss results of labs. Pt also asked about MRI results. Will review with Dr Jessy Fox and return call to pt.

## 2020-10-20 ENCOUNTER — TELEPHONE (OUTPATIENT)
Dept: ONCOLOGY | Age: 70
End: 2020-10-20

## 2020-10-20 RX ORDER — ENZALUTAMIDE 40 MG/1
4 CAPSULE ORAL DAILY
Qty: 120 CAPSULE | Refills: 5 | Status: SHIPPED | OUTPATIENT
Start: 2020-10-20 | End: 2021-02-18 | Stop reason: SDUPTHER

## 2020-10-20 NOTE — TELEPHONE ENCOUNTER
Pt called for a refill. He also is having continuous pain in his knee. He stated that he hasn't seen a dentist yet to be cleared for diphosphonates and is wondering if pallative RT is an option. Will consult will Dr Peña Mail to see if an RT consult is warranted.

## 2020-10-21 ENCOUNTER — TELEPHONE (OUTPATIENT)
Dept: ONCOLOGY | Age: 70
End: 2020-10-21

## 2020-10-21 NOTE — TELEPHONE ENCOUNTER
Per Dr Esmer Wiley, NN phoned pt to let him know that Dr Esmer Wiley wants him to have a bone scan before making an RT referral. Pt expressed understanding and asked for a first apt in the morning.

## 2020-11-23 RX ORDER — HYDROCODONE BITARTRATE AND ACETAMINOPHEN 5; 325 MG/1; MG/1
1 TABLET ORAL EVERY 6 HOURS PRN
Qty: 60 TABLET | Refills: 0 | Status: SHIPPED | OUTPATIENT
Start: 2020-11-23 | End: 2020-12-23

## 2020-11-23 NOTE — TELEPHONE ENCOUNTER
Pt called for refill on pain medication. He stated that he takes it only when needed and usually not more than 3 times per day. Some days he doesn't need it at all. Refill request sent to Dr Ricardo Bardales. Verified apt for next injection.

## 2020-11-24 ENCOUNTER — TELEPHONE (OUTPATIENT)
Dept: ONCOLOGY | Age: 70
End: 2020-11-24

## 2020-11-24 NOTE — TELEPHONE ENCOUNTER
Pt called to state that when he picked up the pain medication it was hydrocodone instead of oxycodone which he has been on. He will try the hydrocodone to see if it works and if not, he will call the office. NN also explained that Dr Luis Miguel De Los Santos stated pt can hold off on bone scan and made referral to RT for palliative radiation. Pt stated he was to wait until January to start the RT. NN will inform .

## 2021-01-07 ENCOUNTER — HOSPITAL ENCOUNTER (OUTPATIENT)
Dept: RADIATION ONCOLOGY | Age: 71
Discharge: HOME | End: 2021-01-07
Payer: COMMERCIAL

## 2021-01-07 VITALS
RESPIRATION RATE: 18 BRPM | OXYGEN SATURATION: 98 % | SYSTOLIC BLOOD PRESSURE: 158 MMHG | DIASTOLIC BLOOD PRESSURE: 82 MMHG | HEART RATE: 76 BPM | BODY MASS INDEX: 30.8 KG/M2 | HEIGHT: 74 IN | WEIGHT: 240 LBS

## 2021-01-07 DIAGNOSIS — C79.52 SECONDARY MALIGNANT NEOPLASM OF BONE AND BONE MARROW (HCC): ICD-10-CM

## 2021-01-07 DIAGNOSIS — C79.51 SECONDARY MALIGNANT NEOPLASM OF BONE AND BONE MARROW (HCC): ICD-10-CM

## 2021-01-07 PROCEDURE — 99202 OFFICE O/P NEW SF 15 MIN: CPT

## 2021-01-07 PROCEDURE — 99205 OFFICE O/P NEW HI 60 MIN: CPT | Performed by: RADIOLOGY

## 2021-01-07 RX ORDER — OXYCODONE HYDROCHLORIDE AND ACETAMINOPHEN 5; 325 MG/1; MG/1
1 TABLET ORAL EVERY 4 HOURS PRN
COMMUNITY
End: 2021-01-22 | Stop reason: SDUPTHER

## 2021-01-07 ASSESSMENT — PAIN DESCRIPTION - FREQUENCY: FREQUENCY: INTERMITTENT

## 2021-01-07 ASSESSMENT — PAIN DESCRIPTION - LOCATION: LOCATION: HIP

## 2021-01-07 NOTE — PROGRESS NOTES
Radiation Oncology Consultation  Encounter Date: 2021 10:51 AM        Mr. Rosas Longo is a 79 y.o. male  : 1950  MRN: [id number]  Acct Number: [de-identified]  Requesting Provider: Dr. Elliott Martino: Gema Mccurdy:   Primary Care: Lili Trujillo MD       REASON FOR CONSULTATION:   Metastatic Prostate Ca  Bony metastatic disease causing neoplastic pain      WORKUP AND TX COURSE:  Oncology History   Prostate cancer ([facility] [address]   3/11/2009 - 2009 Radiation    Pelvis:    4500 cGy  Boost 1: 900 cGy  Boost 2: 2520 cGy  Total Dose: 7920 cGy          Initial Diagnosis    Prostate cancer ([facility] [address]           HPI:   Rosas Longo is a 79 y.o. male with the above referenced diagnosis. Completed Pelvic/Prostate RT for initial diagnosis in  for what appears to be high risk disease. Developed relapse with pleural-nodules in 2017  Bone mets diagnosed during a hospitalization in 2017    Bone Scan - 11/10/17  FINDINGS:   There is evidence of multifocal axial and appendicular metastatic disease. Lesions are noted at each shoulder, bilateral ribs, throughout the thoracic   spine.  Intense activity is noted at the L5 vertebral body level.  There are   areas of activity in the right iliac crest.  Multifocal uptake is noted about   the left hip in the region of the acetabulum and proximal femur in the   subtrochanteric region.  Distal femoral lesions are also noted, slightly more   pronounced on the left.       Mild degenerative type uptake is noted at the knees. Biopsy (L5) - 17  Specimen #[id number]   Final Pathologic Diagnosis:   A.  Needle biopsy of bone, L5:       METASTATIC ADENOCARCINOMA OF PROSTATE.     Fibrosis and sclerosis of bone. B.  Needle aspirate of bone, L5:       METASTATIC ADENOCARCINOMA OF PROSTATE.     Fibrosis and sclerosis of bone.      Has been treated with lupron, zytiga, docetaxel, and now lupron + xtandi PSA has been well controlled and remains undetectable with last lab in October 2020    Seen today for consideration of palliative RT for left hip pain. Pain started 3 months ago  Pt states there is no pain at rest but 5-6/10 on weight bearing and with ambulation  Denies weakness/numbness of LE  He describes the pain as a grinding-type sensation and is improved with oxycodone but he is reluctant to take pain meds      CT Abd/Pel - 2/20/2018  Bones/Soft Tissues: No appreciable change in appearance of multiple   osteoblastic lesions of the lumbar spine and pelvis       MRI - Left Hip - 9/30/2020  Impression   Marrow replacing osseous lesions seen in the bilateral superior acetabulum   and left iliac bone suspicous for osseous metastases.  Lesions in the left   superior acetabulum and left iliac bone are included in the field of view on   the post-contrast sequences and demonstrate heterogeneous enhancement.  There   is an additional peripherally sclerotic lesion in the left subtrochanteric   proximal femur without definite internal enhancement but demonstrates   peripheral enhancement.  These findings could be further assessed with a   nuclear medicine bone scan.       Severe left hip joint osteoarthrosis with bony remodeling of the femoral   head.  There is a large left hip joint effusion with a lobular T2   hyperintense lesion at the posterosuperior margin of the left hip joint. This appears chronic in nature and could represent synovial   proliferation/synovitis.  This would have an atypical appearance for   metastatic disease.       Generalized edema in the left adductor, obturator, and gluteus minimus   musculature.       Fluid bright signal in the region of the quadratus femoris muscle could   represent ischiofemoral bursitis.          Past Medical History:   Diagnosis Date    CAD (coronary artery disease)     Patient states\" he has an enlarged heart    Cancer [facility])     prostate    Chronic kidney disease  Diabetes mellitus ([facility] [address]     Hypertension         Past Surgical History:   Procedure Laterality Date    COLONOSCOPY      ENDOSCOPY, COLON, DIAGNOSTIC  11/28/2017    Gastritis, duodinitis    EYE SURGERY Bilateral     glaucoma       History reviewed. No pertinent family history.     Social History     Socioeconomic History    Marital status:      Spouse name: Not on file    Number of children: Not on file    Years of education: Not on file    Highest education level: Not on file   Occupational History    Not on file   Social Needs    Financial resource strain: Not on file    Food insecurity     Worry: Not on file     Inability: Not on file    Transportation needs     Medical: Not on file     Non-medical: Not on file   Tobacco Use    Smoking status: Current Every Day Smoker     Packs/day: 0.50     Types: Cigarettes    Smokeless tobacco: Never Used   Substance and Sexual Activity    Alcohol use: No    Drug use: No    Sexual activity: Never   Lifestyle    Physical activity     Days per week: Not on file     Minutes per session: Not on file    Stress: Not on file   Relationships    Social connections     Talks on phone: Not on file     Gets together: Not on file     Attends [employer] service: Not on file     Active member of club or organization: Not on file     Attends meetings of clubs or organizations: Not on file     Relationship status: Not on file    Intimate partner violence     Fear of current or ex partner: Not on file     Emotionally abused: Not on file     Physically abused: Not on file     Forced sexual activity: Not on file   Other Topics Concern    Not on file   Social History Narrative    Not on file         ALLERGIES: No Known Allergies       Current Outpatient Medications:     oxyCODONE-acetaminophen (PERCOCET) 5-325 MG per tablet, Take 1 tablet by mouth every 4 hours as needed for Pain., Disp: , Rfl:   enzalutamide (XTANDI) 40 MG capsule, Take 4 capsules by mouth daily, Disp: 120 capsule, Rfl: 5    amLODIPine (NORVASC) 10 MG tablet, Take 1 tablet by mouth daily, Disp: 90 tablet, Rfl: 3    tamsulosin (FLOMAX) 0.4 MG capsule, Take 0.4 mg by mouth daily, Disp: , Rfl:     hydrALAZINE (APRESOLINE) 50 MG tablet, Take 1 tablet by mouth every 12 hours (Patient taking differently: Take by mouth every 12 hours ), Disp: 180 tablet, Rfl: 3    Calcium 600-200 MG-UNIT TABS, Take 2 capsules by mouth daily , Disp: , Rfl:     insulin glargine (BASAGLAR KWIKPEN) 100 UNIT/ML injection pen, Inject 40 Units into the skin nightly varies, Disp: , Rfl:     insulin lispro (HUMALOG) 100 UNIT/ML injection vial, Inject 10 Units into the skin 3 times daily (with meals) (Patient taking differently: Inject into the skin See Admin Instructions 17 Patient states he uses sliding scale regimen for all meals), Disp: 1 vial, Rfl: 3    docusate sodium (COLACE) 100 MG capsule, Take 1 capsule by mouth daily as needed for Constipation, Disp: 30 capsule, Rfl: 0    patiromer sorbitex calcium (VELTASSA) 8.4 g PACK packet, TAKE 1 PACK 3 TIMES A WEEK, Disp: 36 packet, Rfl: 3       REVIEW OF SYSTEMS:   Pertinents as in HPI, the remainder of the 14-point ROS is otherwise negative and has been signed and included in the medical record.       PHYSICAL EXAMINATION:   VITAL SIGNS: BP (!) 158/82   Pulse 76   Resp 18   Ht 6' 2\" (1.88 m)   Wt 240 lb (108.9 kg)   SpO2 98%   BMI 30.81 kg/m²   KARNOFSKY PERFORMANCE STATUS: 90    PAIN RATIN at rest, 5 with ambulation    A&Ox3, NAD  Sclera anicteric, EOMI, no temporalis muscle wasting  No UE/LE edema  Normal mood, affect, judgement  Strength and sensation intact in bilateral LE  Reproducible tenderness to deep palpation in left greater trochanter, no other tender areas      LABORATORY STUDIES:   CBC:   Lab Results   Component Value Date    WBC 8.3 2020    RBC 3.71 2020 HGB 11.8 11/02/2020    HCT 35.4 11/02/2020    MCV 95.5 11/02/2020    MCH 31.8 11/02/2020    MCHC 33.3 11/02/2020    RDW 13.2 11/02/2020     11/02/2020    MPV 8.3 11/02/2020     CMP:  Lab Results   Component Value Date     11/02/2020    K 4.9 11/02/2020     11/02/2020    CO2 28 11/02/2020    BUN 33 11/02/2020    CREATININE 2.3 11/02/2020    GFRAA 34 11/02/2020    AGRATIO 1.2 01/07/2019    LABGLOM 28 11/02/2020    GLUCOSE 110 11/02/2020    PROT 7.0 10/12/2020    LABALBU 4.3 11/02/2020    LABALBU 38 11/09/2017    CALCIUM 9.2 11/02/2020    BILITOT 0.4 10/12/2020    ALKPHOS 141 10/12/2020    AST 19 10/12/2020    ALT 13 10/12/2020     Onc labs:   Lab Results   Component Value Date    PSA <0.1 10/12/2020       RADIOLOGIC STUDIES:     As above  I have personally reviewed the relevant radiographic images. ASSESSMENT/PLAN:  67yo man with metastatic hormone sensitive prostate ca with bony mets and left hip pain  Long discussion with the patient today during which we reviewed his history and imaging studies. His mets in the left pelvis have been present for years without appreciable changes and his PSA remains controlled. Pain started suddenly 3 months ago and MRI suggests changes of left hip joint effusion, possible left hip synovial proliferation or synovitis, edema in the left adductor, obturator, and gluteal muscles, and possible ischiofemoral bursitis in the area of the quadratus femoris muscle. He has multiple etiologies for left hip pain and I am unsure how much if any is related to the stable prostate ca    I discussed the nature of further radiotherapy and efficacy in treating bone pain for metastatic disease. I am hesitant to offer at this point given stability of prostate ca, multiple ortho issues, and prior h/o pelvic RT which would cause radiation field overlap. Will order bone scan and consultation by ortho for expert opinion on MRI findings. [] Moderate - meets 1/3 categories (77323/12772)  1. Any 3 of: Review of prior external notes from each unique source, review results of each unique test, ordering of each unique test, assessment requiring an independent historian  2. Independent interpretation of tests  3. Discussion of management or test interpretation  [x] Extensive - meets 2/3 categories (97443/70817)  1. Any 3 of: Review of prior external notes from each unique source, review results of each unique test, ordering of each unique test, assessment requiring an independent historian  2. Independent interpretation of tests  3. Discussion of management or test interpretation    Risk of complications and/or morbidity/mortality of patient management  [] Minimal (88039/63411)  [] Low (90803/59620)  [] Moderate (73298/58150)  Examples: Rx drug management, decision regarding minor surgery with identified patient or procedure risk factors, decision regarding elective major surgery without identified patient or procedure risk factors, diagnosis or treatment significantly limited by social determinants of health  [x] High (39885/58149)  Examples: drug therapy requiring intensive monitoring for toxicity, decision regarding elective major surgery with identified patient/procedure risk factors, decision regarding emergency major surgery, decision regarding hospitalization, decision for DNR or de-escalation of care because of poor prognosis      LEVEL OF MDM (based on 2/3 above categories)  [] Straightforward (70828/09678)  [] Low (03958/88605)  [] Moderate (97319/21357)  [x] High (49200/24115)      Pertinent history, ROS, and exam summarized. Patient complaints addressed. Reviewed records from other providers. Reviewed recent test results and images with independent interpretation of test results. Discussed forms collected. Consulted with specialist regarding status. Orders for testing placed. Consult placed for services. Electronically signed by Electronically signed by Radha Fowler MD on 1/7/2021 at 10:51 AM

## 2021-01-07 NOTE — PROGRESS NOTES
Pt scheduled for Whole Body Nuc Med Bone Scan at [facility] on Tuesday 1-. Pt to receive injection at 9:00am and to return at 1:00pm for scan. Left message with appt date and times on VM, pt advised to call with any questions or concerns. Referral faxed to [facility] with most recent OV note and radiology reports, for let hip pain.

## 2021-01-07 NOTE — PROGRESS NOTES
Sheila Buddy  1/7/2021    CONSULT     Vitals:    01/07/21 1001   BP: (!) 158/82   Pulse: 76   Resp: 18   SpO2: 98%        Oxygen Therapy  SpO2: 98 %  Pulse Oximeter Device Mode: Continuous  Pulse Oximeter Device Location: Finger  O2 Device: None (Room air)  Skin Assessment: Clean, dry, & intact    Wt Readings from Last 3 Encounters:   01/07/21 240 lb (108.9 kg)   11/11/20 237 lb (107.5 kg)   10/12/20 234 lb (106.1 kg)        Pain Assessment  Pain Assessment: 0-10  Pain Level: 0  Patient's Stated Pain Goal: No pain  Pain Location: Hip  Pain Orientation: Left  Pain Frequency: Intermittent  Prescribed Narcotics- has been out    Fall Risk:    Not currently a fall risk  Uses Cane, Provide Wheelchair PRN, Educate on Assistive Devices and Re-Evaluate Weekly    Nutritional Alteration:  Loss of Appetite  No Difficulties Swallowing  Voices Sufficient Oral Intake    Mediport: no    Pacemaker/ICD: no    Implants: no    Previous XRT: yes, 2009  for Prostate CA    Assessment: Pt here for a new consult to discuss RAD TX options. States he does have pain when he moves around in his left hip, does have some loss of appetite. Also states he is out of b/p and pain medication. States no other concerns.     Past Medical History:   Diagnosis Date    CAD (coronary artery disease)     Patient states\" he has an enlarged heart    Cancer ([facility] [address]     prostate    Chronic kidney disease     Diabetes mellitus ([facility] [address]     Hypertension        Past Surgical History:   Procedure Laterality Date    COLONOSCOPY      ENDOSCOPY, COLON, DIAGNOSTIC  11/28/2017    Gastritis, duodinitis    EYE SURGERY Bilateral     glaucoma       No Known Allergies       Current Outpatient Medications:     oxyCODONE-acetaminophen (PERCOCET) 5-325 MG per tablet, Take 1 tablet by mouth every 4 hours as needed for Pain., Disp: , Rfl:     enzalutamide (XTANDI) 40 MG capsule, Take 4 capsules by mouth daily, Disp: 120 capsule, Rfl: 5   amLODIPine (NORVASC) 10 MG tablet, Take 1 tablet by mouth daily, Disp: 90 tablet, Rfl: 3    tamsulosin (FLOMAX) 0.4 MG capsule, Take 0.4 mg by mouth daily, Disp: , Rfl:     hydrALAZINE (APRESOLINE) 50 MG tablet, Take 1 tablet by mouth every 12 hours (Patient taking differently: Take by mouth every 12 hours ), Disp: 180 tablet, Rfl: 3    Calcium 600-200 MG-UNIT TABS, Take 2 capsules by mouth daily , Disp: , Rfl:     insulin glargine (BASAGLAR KWIKPEN) 100 UNIT/ML injection pen, Inject 40 Units into the skin nightly varies, Disp: , Rfl:     insulin lispro (HUMALOG) 100 UNIT/ML injection vial, Inject 10 Units into the skin 3 times daily (with meals) (Patient taking differently: Inject into the skin See Admin Instructions 11/09/17 Patient states he uses sliding scale regimen for all meals), Disp: 1 vial, Rfl: 3    docusate sodium (COLACE) 100 MG capsule, Take 1 capsule by mouth daily as needed for Constipation, Disp: 30 capsule, Rfl: 0    patiromer sorbitex calcium (VELTASSA) 8.4 g PACK packet, TAKE 1 PACK 3 TIMES A WEEK, Disp: 36 packet, Rfl: 3    ADDITIONAL COMMENTS:     Electronically signed by Darin John MA on 1/7/2021 at 10:05 AM

## 2021-01-12 ENCOUNTER — HOSPITAL ENCOUNTER (OUTPATIENT)
Dept: NUCLEAR MEDICINE | Age: 71
Discharge: HOME | End: 2021-01-12
Payer: COMMERCIAL

## 2021-01-12 DIAGNOSIS — C79.51 SECONDARY MALIGNANT NEOPLASM OF BONE AND BONE MARROW (HCC): ICD-10-CM

## 2021-01-12 DIAGNOSIS — C79.52 SECONDARY MALIGNANT NEOPLASM OF BONE AND BONE MARROW (HCC): ICD-10-CM

## 2021-01-12 PROCEDURE — 3430000000 HC RX DIAGNOSTIC RADIOPHARMACEUTICAL: Performed by: RADIOLOGY

## 2021-01-12 PROCEDURE — 78306 BONE IMAGING WHOLE BODY: CPT

## 2021-01-12 PROCEDURE — A9503 TC99M MEDRONATE: HCPCS | Performed by: RADIOLOGY

## 2021-01-12 RX ORDER — TC 99M MEDRONATE 20 MG/10ML
28.6 INJECTION, POWDER, LYOPHILIZED, FOR SOLUTION INTRAVENOUS
Status: COMPLETED | OUTPATIENT
Start: 2021-01-12 | End: 2021-01-12

## 2021-01-12 RX ADMIN — TC 99M MEDRONATE 28.6 MILLICURIE: 20 INJECTION, POWDER, LYOPHILIZED, FOR SOLUTION INTRAVENOUS at 10:00

## 2021-01-13 ENCOUNTER — TELEPHONE (OUTPATIENT)
Dept: INFUSION THERAPY | Age: 71
End: 2021-01-13

## 2021-01-13 NOTE — TELEPHONE ENCOUNTER
Returned call to pt & rescheduled his missed lab/injection appt from yesterday to  1/22/21 @ 2:15; pt voiced understanding.

## 2021-01-22 ENCOUNTER — HOSPITAL ENCOUNTER (OUTPATIENT)
Dept: INFUSION THERAPY | Age: 71
Discharge: HOME | End: 2021-01-22
Payer: COMMERCIAL

## 2021-01-22 DIAGNOSIS — C61 PROSTATE CANCER (HCC): Primary | ICD-10-CM

## 2021-01-22 DIAGNOSIS — M84.50XA PATHOLOGICAL FRACTURE DUE TO METASTATIC BONE DISEASE: ICD-10-CM

## 2021-01-22 LAB
ALBUMIN SERPL-MCNC: 4 GM/DL (ref 3.4–5)
ALP BLD-CCNC: 111 IU/L (ref 40–129)
ALT SERPL-CCNC: 10 U/L (ref 10–40)
ANION GAP SERPL CALCULATED.3IONS-SCNC: 12 MMOL/L (ref 4–16)
AST SERPL-CCNC: 17 IU/L (ref 15–37)
BASOPHILS ABSOLUTE: 0 K/CU MM
BASOPHILS RELATIVE PERCENT: 0.2 % (ref 0–1)
BILIRUB SERPL-MCNC: 0.4 MG/DL (ref 0–1)
BUN BLDV-MCNC: 32 MG/DL (ref 6–23)
CALCIUM SERPL-MCNC: 8.9 MG/DL (ref 8.3–10.6)
CHLORIDE BLD-SCNC: 103 MMOL/L (ref 99–110)
CO2: 25 MMOL/L (ref 21–32)
CREAT SERPL-MCNC: 2.4 MG/DL (ref 0.9–1.3)
DIFFERENTIAL TYPE: ABNORMAL
EOSINOPHILS ABSOLUTE: 0.3 K/CU MM
EOSINOPHILS RELATIVE PERCENT: 4 % (ref 0–3)
GFR AFRICAN AMERICAN: 33 ML/MIN/1.73M2
GFR NON-AFRICAN AMERICAN: 27 ML/MIN/1.73M2
GLUCOSE BLD-MCNC: 100 MG/DL (ref 70–99)
HCT VFR BLD CALC: 36 % (ref 42–52)
HEMOGLOBIN: 11.4 GM/DL (ref 13.5–18)
LACTATE DEHYDROGENASE: 148 IU/L (ref 120–246)
LYMPHOCYTES ABSOLUTE: 1.7 K/CU MM
LYMPHOCYTES RELATIVE PERCENT: 21.2 % (ref 24–44)
MCH RBC QN AUTO: 31.1 PG (ref 27–31)
MCHC RBC AUTO-ENTMCNC: 31.7 % (ref 32–36)
MCV RBC AUTO: 98.1 FL (ref 78–100)
MONOCYTES ABSOLUTE: 0.6 K/CU MM
MONOCYTES RELATIVE PERCENT: 7 % (ref 0–4)
PDW BLD-RTO: 12.7 % (ref 11.7–14.9)
PLATELET # BLD: 217 K/CU MM (ref 140–440)
PMV BLD AUTO: 9.1 FL (ref 7.5–11.1)
POTASSIUM SERPL-SCNC: 4.5 MMOL/L (ref 3.5–5.1)
RBC # BLD: 3.67 M/CU MM (ref 4.6–6.2)
SEGMENTED NEUTROPHILS ABSOLUTE COUNT: 5.5 K/CU MM
SEGMENTED NEUTROPHILS RELATIVE PERCENT: 67.6 % (ref 36–66)
SODIUM BLD-SCNC: 140 MMOL/L (ref 135–145)
TOTAL PROTEIN: 6.8 GM/DL (ref 6.4–8.2)
WBC # BLD: 8.2 K/CU MM (ref 4–10.5)

## 2021-01-22 PROCEDURE — 84153 ASSAY OF PSA TOTAL: CPT

## 2021-01-22 PROCEDURE — 36415 COLL VENOUS BLD VENIPUNCTURE: CPT

## 2021-01-22 PROCEDURE — 84154 ASSAY OF PSA FREE: CPT

## 2021-01-22 PROCEDURE — 80053 COMPREHEN METABOLIC PANEL: CPT

## 2021-01-22 PROCEDURE — 83615 LACTATE (LD) (LDH) ENZYME: CPT

## 2021-01-22 PROCEDURE — 85025 COMPLETE CBC W/AUTO DIFF WBC: CPT

## 2021-01-22 PROCEDURE — 6360000002 HC RX W HCPCS: Performed by: INTERNAL MEDICINE

## 2021-01-22 PROCEDURE — 96402 CHEMO HORMON ANTINEOPL SQ/IM: CPT

## 2021-01-22 RX ORDER — HYDRALAZINE HYDROCHLORIDE 10 MG/1
TABLET, FILM COATED ORAL
Qty: 60 TABLET | Refills: 0 | Status: SHIPPED | OUTPATIENT
Start: 2021-01-22 | End: 2021-01-22

## 2021-01-22 RX ORDER — HYDRALAZINE HYDROCHLORIDE 10 MG/1
TABLET, FILM COATED ORAL
Qty: 180 TABLET | Refills: 1 | Status: SHIPPED | OUTPATIENT
Start: 2021-01-22 | End: 2021-05-03 | Stop reason: SDUPTHER

## 2021-01-22 RX ORDER — OXYCODONE HYDROCHLORIDE AND ACETAMINOPHEN 5; 325 MG/1; MG/1
1 TABLET ORAL EVERY 6 HOURS PRN
Qty: 120 TABLET | Refills: 0 | Status: SHIPPED | OUTPATIENT
Start: 2021-01-22 | End: 2021-02-21

## 2021-01-22 RX ORDER — TAMSULOSIN HYDROCHLORIDE 0.4 MG/1
0.4 CAPSULE ORAL DAILY
Qty: 30 CAPSULE | Refills: 5 | Status: SHIPPED | OUTPATIENT
Start: 2021-01-22 | End: 2021-09-13 | Stop reason: SDUPTHER

## 2021-01-22 RX ADMIN — LEUPROLIDE ACETATE 22.5 MG: KIT SUBCUTANEOUS at 11:17

## 2021-01-22 NOTE — TELEPHONE ENCOUNTER
Patient came in today for Injection and lab draw and stated he needed refills on these medications. He states he doesn't have enough money to see another doctor to get his Tamsulosin filled for his prostate he states he no longer has prostate cancer so he is unsure if you want him to continue taking it. The hydralazine was originally prescribed my Dr. Manriquez per his pill bottle.

## 2021-01-22 NOTE — PROGRESS NOTES
1120: RN Assessment    Pt here for Eligard today. Subcut injection given in RLQ abdomen, bandaid to site. Pt asked about having labs today. Labs ordered for his return apt date. Our lab department did draw these today, per pt request.    Pt will f/u with Dr. Shin Else at his upcoming injection appts to get labs ordered for those dates. Pt does not want to come in multiple times for labs and injections and return appts. Saeid SALDANA spoke to the pt today while he was here. Rosanne Leslie MA refilled medications for the pt while here.     AVS report provided to the pt

## 2021-02-04 ENCOUNTER — TELEPHONE (OUTPATIENT)
Dept: RADIATION ONCOLOGY | Age: 71
End: 2021-02-04

## 2021-02-04 NOTE — TELEPHONE ENCOUNTER
Followed up with pt regarding Orthopedic Consult with [id number] Gonzalez last week. Consultation noted reviewed by . Pt voiced does not want to come in for follow up if not necessary. Dr. Mendoza Hinojosa discussed results with Daniel Arenas. Pt to keep follow up with her on 2- as scheduled. No further follow needed with  unless further questions or concerns. Pt voices understanding.

## 2021-02-18 RX ORDER — ENZALUTAMIDE 40 MG/1
4 CAPSULE ORAL DAILY
Qty: 120 CAPSULE | Refills: 2 | Status: SHIPPED | OUTPATIENT
Start: 2021-02-18 | End: 2021-02-23 | Stop reason: SDUPTHER

## 2021-02-19 ENCOUNTER — HOSPITAL ENCOUNTER (OUTPATIENT)
Dept: INFUSION THERAPY | Age: 71
Discharge: HOME | End: 2021-02-19
Payer: COMMERCIAL

## 2021-02-19 ENCOUNTER — OFFICE VISIT (OUTPATIENT)
Dept: ONCOLOGY | Age: 71
End: 2021-02-19
Payer: COMMERCIAL

## 2021-02-19 VITALS
DIASTOLIC BLOOD PRESSURE: 74 MMHG | TEMPERATURE: 96.7 F | BODY MASS INDEX: 29.85 KG/M2 | HEIGHT: 74 IN | SYSTOLIC BLOOD PRESSURE: 128 MMHG | HEART RATE: 92 BPM | WEIGHT: 232.6 LBS | RESPIRATION RATE: 16 BRPM | OXYGEN SATURATION: 98 %

## 2021-02-19 DIAGNOSIS — C79.51 SECONDARY MALIGNANT NEOPLASM OF BONE AND BONE MARROW (HCC): ICD-10-CM

## 2021-02-19 DIAGNOSIS — C79.52 SECONDARY MALIGNANT NEOPLASM OF BONE AND BONE MARROW (HCC): ICD-10-CM

## 2021-02-19 DIAGNOSIS — C61 PROSTATE CANCER (HCC): Primary | ICD-10-CM

## 2021-02-19 PROCEDURE — 99211 OFF/OP EST MAY X REQ PHY/QHP: CPT

## 2021-02-19 PROCEDURE — 99214 OFFICE O/P EST MOD 30 MIN: CPT | Performed by: INTERNAL MEDICINE

## 2021-02-19 ASSESSMENT — PATIENT HEALTH QUESTIONNAIRE - PHQ9
SUM OF ALL RESPONSES TO PHQ QUESTIONS 1-9: 0
1. LITTLE INTEREST OR PLEASURE IN DOING THINGS: 0
SUM OF ALL RESPONSES TO PHQ QUESTIONS 1-9: 0

## 2021-02-19 NOTE — PROGRESS NOTES
MA Rooming Questions  Patient: Leonie Hernandez  MRN: [id number]    Date: 2/19/2021        1. Do you have any new issues?   no         2. Do you need any refills on medications?    no    3. Have you had any imaging done since your last visit? yes - Hip    4. Have you been hospitalized or seen in the emergency room since your last visit here?   no    5. Did the patient have a depression screening completed today?  No    PHQ-9 Total Score: 0 (2/19/2021 11:32 AM)       PHQ-9 Given to (if applicable):               PHQ-9 Score (if applicable):                     [] Positive     []  Negative              Does question #9 need addressed (if applicable)                     [] Yes    []  No               Deniz Bowles MA

## 2021-02-19 NOTE — PROGRESS NOTES
Patient Name: Calvin Pike  Patient : 1950  Patient MRN: [id number]     Primary Oncologist: Abbey Henry MD  Referring Physician: Tai Lopez MD, Dinesh Gannon   Urologist: Dr Alyssa Llanos       Date of Service: 2021      Chief Complaint:   Chief Complaint   Patient presents with    Follow-up    Results        Active Problem list  1. Prostate cancer ([address]    2. 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 A vazquez was placed and he has been hydrated. Renal and urology are on board. Has some right shoulder pain. Lost 20 lbs in the past few months. ONC history  Hx of prostate cancer s/p XRT  Treated at [facility]  October 10, 2017 showed multiple pleural-based lung nodules of bilateral lung bases with the largest measuring 1.2 cm of posterior right lung base new since 2014. 2017 admission for hyperkalemia acute renal failure, CT head negative for intracranial abnormality. CT abdomen pelvis showed multiple lung base pulmonary nodules. Blastic lesions within the visualized thoracic and lumbar spine. Findings concerning for metastatic disease. Progressive bilateral hydroureteronephrosis without evidence for obstructing calculus  November 10, 2017 bone scan showed multifocal axial and appendicular skeletal disease. Casodex was started.  PSA was 112.3  2017 Bone biopsy showed metastatic adenocarcinoma the prostate Based on L5 bone biopsy   saw DR Emmanuel Jo)   lupron started  dec 2017 s/p EGD essentially normal  dec 13,2017 started cycle #1 docetaxel, received 3C until  Admitted for hyperkalemia, underwent a temporary hemodialysis, PSA was 3  Then placed on Zytiga until August arthropathy or osteomyelitis.       Asymmetric focal activity within the proximal left tibial diaphysis, which   can be due to healing trauma or metastatic disease.  If patient is focally   symptomatic, radiograph could be considered.       Multifocal degenerative changes are otherwise favored as outlined above. 1/24/21: PSA <0.1    PMH: CAD, HTN, HLP, DM, CKD    PSH: upper and lower endoscopy    SH: Lives alone. Three boys. Quit smoking long time ago but started smoking 1PP two and half days. Prior to quitting in 2014 he smoked 1-2 ppd for 40 years. Also h/O ETOH abuse. No other illicit drug abuse    FH: Whole family , his father had 8 sibling and all had cancer, does know what cancer. Has a brother and sister but no cancer diagnosis. Interval History  2/19/2021:Arrived alone to the clinic. Reported that he continues to have pain in the left knee radiating to the left hip, no pain when he sits on the recliner, driving. But pain with ambulation. Uses cane  to ambulate. Is being followed by Ortho. No back pain. Has been out of [employer] for a month. Appetite is preserved,lost 2 lbs since last visit. No chest pain, increased sob, palpitations or any dizziness. No abdominal pain, nausea, emesis,diarrhea or any constipation. No  sx. No LE edema.      Review of Systems   Per interval history; otherwise 10 point ROS is negative              Vital Signs:  /74 (Site: Right Upper Arm, Position: Sitting, Cuff Size: Medium Adult)   Pulse 92   Temp 96.7 °F (35.9 °C) (Infrared)   Resp 16   Ht 6' 2\" (1.88 m)   Wt 232 lb 9.6 oz (105.5 kg)   SpO2 98%   BMI 29.86 kg/m²     Physical Exam:    CONSTITUTIONAL: awake, alert, tired aapearing  EYES: No palor or any icterus  ENT: ATNC  NECK: No JVD  HEMATOLOGIC/LYMPHATIC: no cervical, supraclavicular or axillary lymphadenopathy   LUNGS: CTAB  CARDIOVASCULAR: s1s2 rrr  ABDOMEN: normal bowel sounds x 4, soft, non-distended, non-tender, no masses palpated, no Kim Garcia           Electronically signed by Roopa Higginbotham MD on 2/19/2021 at 11:56 AM

## 2021-02-23 ENCOUNTER — TELEPHONE (OUTPATIENT)
Dept: ONCOLOGY | Age: 71
End: 2021-02-23

## 2021-02-23 RX ORDER — ENZALUTAMIDE 40 MG/1
4 CAPSULE ORAL DAILY
Qty: 120 CAPSULE | Refills: 2 | Status: SHIPPED | OUTPATIENT
Start: 2021-02-23 | End: 2021-05-05

## 2021-02-23 NOTE — TELEPHONE ENCOUNTER
Confirming Rx benefits- Pt advised he called [phone] and was under the impression that someone from the above number was going to process his Rx, However the # above is for [phone] and he is no longer eligible to receive Xtandi for free so they cannot dispense. This will have to be resent to a different pharmacy.  Will have this sent to [facility]

## 2021-03-04 DIAGNOSIS — Z79.4 TYPE 2 DIABETES MELLITUS WITHOUT COMPLICATION, WITH LONG-TERM CURRENT USE OF INSULIN (HCC): Chronic | ICD-10-CM

## 2021-03-04 DIAGNOSIS — C61 PROSTATE CANCER (HCC): ICD-10-CM

## 2021-03-04 DIAGNOSIS — I25.10 CORONARY ARTERY DISEASE INVOLVING NATIVE HEART WITHOUT ANGINA PECTORIS, UNSPECIFIED VESSEL OR LESION TYPE: ICD-10-CM

## 2021-03-04 DIAGNOSIS — E11.9 TYPE 2 DIABETES MELLITUS WITHOUT COMPLICATION, WITH LONG-TERM CURRENT USE OF INSULIN (HCC): Chronic | ICD-10-CM

## 2021-03-04 DIAGNOSIS — E87.5 HYPERKALEMIA: ICD-10-CM

## 2021-03-04 DIAGNOSIS — I10 ESSENTIAL HYPERTENSION, BENIGN: Chronic | ICD-10-CM

## 2021-03-04 DIAGNOSIS — N18.32 STAGE 3B CHRONIC KIDNEY DISEASE (HCC): ICD-10-CM

## 2021-03-04 LAB
ALBUMIN SERPL-MCNC: 4.2 G/DL (ref 3.4–5)
ANION GAP SERPL CALCULATED.3IONS-SCNC: 12 MMOL/L (ref 3–16)
BACTERIA: ABNORMAL /HPF
BILIRUBIN URINE: NEGATIVE
BLOOD, URINE: ABNORMAL
BUN BLDV-MCNC: 33 MG/DL (ref 7–20)
CALCIUM SERPL-MCNC: 9.4 MG/DL (ref 8.3–10.6)
CHLORIDE BLD-SCNC: 98 MMOL/L (ref 99–110)
CLARITY: ABNORMAL
CO2: 26 MMOL/L (ref 21–32)
COLOR: YELLOW
CREAT SERPL-MCNC: 2.3 MG/DL (ref 0.8–1.3)
CREATININE URINE: 113.1 MG/DL (ref 39–259)
EPITHELIAL CELLS, UA: 0 /HPF (ref 0–5)
GFR AFRICAN AMERICAN: 34
GFR NON-AFRICAN AMERICAN: 28
GLUCOSE BLD-MCNC: 110 MG/DL (ref 70–99)
GLUCOSE URINE: NEGATIVE MG/DL
HYALINE CASTS: 6 /LPF (ref 0–8)
KETONES, URINE: NEGATIVE MG/DL
LEUKOCYTE ESTERASE, URINE: ABNORMAL
MAGNESIUM: 1.9 MG/DL (ref 1.8–2.4)
MICROSCOPIC EXAMINATION: YES
NITRITE, URINE: POSITIVE
PH UA: 6 (ref 5–8)
PHOSPHORUS: 3.4 MG/DL (ref 2.5–4.9)
POTASSIUM SERPL-SCNC: 4.5 MMOL/L (ref 3.5–5.1)
PROTEIN PROTEIN: 63 MG/DL
PROTEIN UA: 100 MG/DL
PROTEIN/CREAT RATIO: 0.6 MG/DL
RBC UA: 6 /HPF (ref 0–4)
SODIUM BLD-SCNC: 136 MMOL/L (ref 136–145)
SPECIFIC GRAVITY UA: 1.02 (ref 1–1.03)
URINE TYPE: ABNORMAL
UROBILINOGEN, URINE: 0.2 E.U./DL
WBC UA: 34 /HPF (ref 0–5)

## 2021-04-12 ENCOUNTER — HOSPITAL ENCOUNTER (OUTPATIENT)
Dept: INFUSION THERAPY | Age: 71
Discharge: HOME | End: 2021-04-12
Payer: COMMERCIAL

## 2021-04-12 DIAGNOSIS — C79.51 SECONDARY MALIGNANT NEOPLASM OF BONE AND BONE MARROW (HCC): ICD-10-CM

## 2021-04-12 DIAGNOSIS — C61 PROSTATE CANCER (HCC): Primary | ICD-10-CM

## 2021-04-12 DIAGNOSIS — C79.52 SECONDARY MALIGNANT NEOPLASM OF BONE AND BONE MARROW (HCC): ICD-10-CM

## 2021-04-12 LAB
ALBUMIN SERPL-MCNC: 3.8 GM/DL (ref 3.4–5)
ALP BLD-CCNC: 114 IU/L (ref 40–129)
ALT SERPL-CCNC: 9 U/L (ref 10–40)
ANION GAP SERPL CALCULATED.3IONS-SCNC: 7 MMOL/L (ref 4–16)
AST SERPL-CCNC: 13 IU/L (ref 15–37)
BASOPHILS ABSOLUTE: 0 K/CU MM
BASOPHILS RELATIVE PERCENT: 0.2 % (ref 0–1)
BILIRUB SERPL-MCNC: 0.3 MG/DL (ref 0–1)
BUN BLDV-MCNC: 41 MG/DL (ref 6–23)
CALCIUM SERPL-MCNC: 8.8 MG/DL (ref 8.3–10.6)
CHLORIDE BLD-SCNC: 105 MMOL/L (ref 99–110)
CO2: 26 MMOL/L (ref 21–32)
CREAT SERPL-MCNC: 2.4 MG/DL (ref 0.9–1.3)
DIFFERENTIAL TYPE: ABNORMAL
EOSINOPHILS ABSOLUTE: 0.3 K/CU MM
EOSINOPHILS RELATIVE PERCENT: 2.9 % (ref 0–3)
GFR AFRICAN AMERICAN: 32 ML/MIN/1.73M2
GFR NON-AFRICAN AMERICAN: 27 ML/MIN/1.73M2
GLUCOSE BLD-MCNC: 103 MG/DL (ref 70–99)
HCT VFR BLD CALC: 34.4 % (ref 42–52)
HEMOGLOBIN: 10.9 GM/DL (ref 13.5–18)
LACTATE DEHYDROGENASE: 141 IU/L (ref 120–246)
LYMPHOCYTES ABSOLUTE: 1.9 K/CU MM
LYMPHOCYTES RELATIVE PERCENT: 19.3 % (ref 24–44)
MCH RBC QN AUTO: 31.1 PG (ref 27–31)
MCHC RBC AUTO-ENTMCNC: 31.7 % (ref 32–36)
MCV RBC AUTO: 98.3 FL (ref 78–100)
MONOCYTES ABSOLUTE: 0.6 K/CU MM
MONOCYTES RELATIVE PERCENT: 6.6 % (ref 0–4)
PDW BLD-RTO: 12.6 % (ref 11.7–14.9)
PLATELET # BLD: 238 K/CU MM (ref 140–440)
PMV BLD AUTO: 8.6 FL (ref 7.5–11.1)
POTASSIUM SERPL-SCNC: 4.4 MMOL/L (ref 3.5–5.1)
RBC # BLD: 3.5 M/CU MM (ref 4.6–6.2)
SEGMENTED NEUTROPHILS ABSOLUTE COUNT: 6.8 K/CU MM
SEGMENTED NEUTROPHILS RELATIVE PERCENT: 71 % (ref 36–66)
SODIUM BLD-SCNC: 138 MMOL/L (ref 135–145)
TOTAL PROTEIN: 6.8 GM/DL (ref 6.4–8.2)
WBC # BLD: 9.6 K/CU MM (ref 4–10.5)

## 2021-04-12 PROCEDURE — 84154 ASSAY OF PSA FREE: CPT

## 2021-04-12 PROCEDURE — 36415 COLL VENOUS BLD VENIPUNCTURE: CPT

## 2021-04-12 PROCEDURE — 96402 CHEMO HORMON ANTINEOPL SQ/IM: CPT

## 2021-04-12 PROCEDURE — 83615 LACTATE (LD) (LDH) ENZYME: CPT

## 2021-04-12 PROCEDURE — 85025 COMPLETE CBC W/AUTO DIFF WBC: CPT

## 2021-04-12 PROCEDURE — 84153 ASSAY OF PSA TOTAL: CPT

## 2021-04-12 PROCEDURE — 80053 COMPREHEN METABOLIC PANEL: CPT

## 2021-04-12 PROCEDURE — 6360000002 HC RX W HCPCS: Performed by: INTERNAL MEDICINE

## 2021-04-12 RX ADMIN — LEUPROLIDE ACETATE 22.5 MG: KIT SUBCUTANEOUS at 14:49

## 2021-04-12 NOTE — PROGRESS NOTES
Patient arrived to treatment suite for Eligard injection. Stated that he has blood drawn during every visit, so ok'd the bloodwork to be done today instead of 4/22 per Dr. Alex Randolph.  Patient also stated that [place] during the next scheduled injection, which was July 6, so the appointment was moved up to June 28. Venipuncture performed on right AC to obtain blood for labs. Patient remained in treatment suite for results. Treatment approved and given subcutaneously in left upper quadrant of abdomen. Band-aid applied. Patient tolerated well. Left treatment suite ambulatory. Discharge instructions declined, but new appointment card provided.

## 2021-05-03 ENCOUNTER — OFFICE VISIT (OUTPATIENT)
Dept: ONCOLOGY | Age: 71
End: 2021-05-03
Payer: COMMERCIAL

## 2021-05-03 ENCOUNTER — HOSPITAL ENCOUNTER (OUTPATIENT)
Dept: INFUSION THERAPY | Age: 71
Discharge: HOME | End: 2021-05-03
Payer: COMMERCIAL

## 2021-05-03 VITALS
HEART RATE: 93 BPM | TEMPERATURE: 96.9 F | WEIGHT: 237.8 LBS | BODY MASS INDEX: 30.52 KG/M2 | DIASTOLIC BLOOD PRESSURE: 69 MMHG | OXYGEN SATURATION: 96 % | RESPIRATION RATE: 16 BRPM | HEIGHT: 74 IN | SYSTOLIC BLOOD PRESSURE: 129 MMHG

## 2021-05-03 DIAGNOSIS — C61 PROSTATE CANCER (HCC): ICD-10-CM

## 2021-05-03 DIAGNOSIS — C79.52 SECONDARY MALIGNANT NEOPLASM OF BONE AND BONE MARROW (HCC): Primary | ICD-10-CM

## 2021-05-03 DIAGNOSIS — C79.51 SECONDARY MALIGNANT NEOPLASM OF BONE AND BONE MARROW (HCC): Primary | ICD-10-CM

## 2021-05-03 PROCEDURE — 99214 OFFICE O/P EST MOD 30 MIN: CPT | Performed by: INTERNAL MEDICINE

## 2021-05-03 PROCEDURE — 99211 OFF/OP EST MAY X REQ PHY/QHP: CPT

## 2021-05-03 RX ORDER — HYDRALAZINE HYDROCHLORIDE 10 MG/1
TABLET, FILM COATED ORAL
Qty: 180 TABLET | Refills: 5 | Status: SHIPPED | OUTPATIENT
Start: 2021-05-03 | End: 2021-06-11 | Stop reason: SDUPTHER

## 2021-05-03 NOTE — PROGRESS NOTES
MA Rooming Questions  Patient: Nick Toledo  MRN: [id number]    Date: 5/3/2021        1. Do you have any new issues? Knees    2. Do you need any refills on medications?    no    3. Have you had any imaging done since your last visit?   no    4. Have you been hospitalized or seen in the emergency room since your last visit here?   no    5. Did the patient have a depression screening completed today?  Yes    No data recorded     PHQ-9 Given to (if applicable):               PHQ-9 Score (if applicable):                     [] Positive     []  Negative              Does question #9 need addressed (if applicable)                     [] Yes    []  No               Bianca Valle CMA

## 2021-05-03 NOTE — PROGRESS NOTES
Patient Name: Liliana Rizo  Patient : 1950  Patient MRN: [id number]     Primary Oncologist: Graydon Kayser, MD  Referring Physician: Maddi De Guzman MD, Mendel Pump   Urologist: Dr Long Giron       Date of Service: 5/3/2021      Chief Complaint:   Chief Complaint   Patient presents with    Discuss Labs        Active Problem list  1. Secondary malignant neoplasm of bone and bone marrow ([facility] [address]    2. Prostate cancer [facility])           HPI:        Elderly  male admitted for abnormal labs found to be in a acute renal failure with hyperkalemia 2017 With a creatinine of 7.5 CT AP shows evidence of metastatic disease in the abdomen and lungs. A vazquez was placed and he has been hydrated. Renal and urology are on board. Has some right shoulder pain. Lost 20 lbs in the past few months. ONC history  Hx of prostate cancer s/p XRT  Treated at [facility]  October 10, 2017 showed multiple pleural-based lung nodules of bilateral lung bases with the largest measuring 1.2 cm of posterior right lung base new since 2014. 2017 admission for hyperkalemia acute renal failure, CT head negative for intracranial abnormality. CT abdomen pelvis showed multiple lung base pulmonary nodules. Blastic lesions within the visualized thoracic and lumbar spine. Findings concerning for metastatic disease. Progressive bilateral hydroureteronephrosis without evidence for obstructing calculus  November 10, 2017 bone scan showed multifocal axial and appendicular skeletal disease. Casodex was started.  PSA was 112.3  2017 Bone biopsy showed metastatic adenocarcinoma the prostate Based on L5 bone biopsy   saw DR Tao Mo)   lupron started  dec 2017 s/p EGD essentially normal  dec 13,2017 started cycle #1 docetaxel, received 3C until  Admitted for hyperkalemia, underwent a temporary hemodialysis, PSA was 3  Then placed on Zytiga until b 2018 CT CAP showed Osteoblastic skeletal metastatic disease. Tiny pulmonary nodules are almost certainly postinflammatory. No definite  findings of intrathoracic or intra abdominopelvic metastatic disease and he also developed some neuropathy in his left 5th digit, PSA < 2   june 2018 = 0.1  August 2018 he started taking Xtandi  august 2018 = 0.1  december 2018 PSA 0.04   3-19 pSA 0.01  6-19 PSA < 0.1   9-19 PSA < 0.1  12/27/2019: PSA <0.1    9/30/20: MRI Hip:  Marrow replacing osseous lesions seen in the bilateral superior acetabulum    and left iliac bone suspicous for osseous metastases.  Lesions in the left    superior acetabulum and left iliac bone are included in the field of view on    the post-contrast sequences and demonstrate heterogeneous enhancement.  There    is an additional peripherally sclerotic lesion in the left subtrochanteric    proximal femur without definite internal enhancement but demonstrates    peripheral enhancement.  These findings could be further assessed with a    nuclear medicine bone scan.         Severe left hip joint osteoarthrosis with bony remodeling of the femoral    head.  There is a large left hip joint effusion with a lobular T2    hyperintense lesion at the posterosuperior margin of the left hip joint. This appears chronic in nature and could represent synovial    proliferation/synovitis.  This would have an atypical appearance for    metastatic disease.         Generalized edema in the left adductor, obturator, and gluteus minimus    musculature.         Fluid bright signal in the region of the quadratus femoris muscle could    represent ischiofemoral bursitis. 1/21/21:NBS  Activity is demonstrated at left greater than right acetabulum, compatible   with that marrow signal abnormality on recent MR, and can reflect osseous   metastatic disease given suspicion for such on MR.  Additional considerations   which can have this bone scan appearance include arthropathy or HGB 10.9 (L) 04/12/2021    HCT 34.4 (L) 04/12/2021    MCV 98.3 04/12/2021    MCH 31.1 (H) 04/12/2021    MCHC 31.7 (L) 04/12/2021    RDW 12.6 04/12/2021     04/12/2021    MPV 8.6 04/12/2021    BANDSPCT 4 (L) 10/08/2017    SEGSPCT 71.0 (H) 04/12/2021    EOSRELPCT 2.9 04/12/2021    BASOPCT 0.2 04/12/2021    LYMPHOPCT 19.3 (L) 04/12/2021    MONOPCT 6.6 (H) 04/12/2021    BANDABS 0.64 10/08/2017    SEGSABS 6.8 04/12/2021    EOSABS 0.3 04/12/2021    BASOSABS 0.0 04/12/2021    LYMPHSABS 1.9 04/12/2021    MONOSABS 0.6 04/12/2021    DIFFTYPE AUTOMATED DIFFERENTIAL 04/12/2021    ANISOCYTOSIS 1+ 10/08/2017    POLYCHROM 1+ 10/08/2017     No results found for: ESR    Chemistry:  Lab Results   Component Value Date     04/12/2021    K 4.4 04/12/2021     04/12/2021    CO2 26 04/12/2021    BUN 41 (H) 04/12/2021    CREATININE 2.4 (H) 04/12/2021    GLUCOSE 103 (H) 04/12/2021    CALCIUM 8.8 04/12/2021    PROT 6.8 04/12/2021    LABALBU 3.8 04/12/2021    BILITOT 0.3 04/12/2021    ALKPHOS 114 04/12/2021    AST 13 (L) 04/12/2021    ALT 9 (L) 04/12/2021    LABGLOM 27 (L) 04/12/2021    GFRAA 32 (L) 04/12/2021    AGRATIO 1.2 01/07/2019    GLOB 3.4 01/07/2019    PHOS 3.4 03/04/2021    MG 1.90 03/04/2021    POCGLU 109 (H) 01/15/2018     Lab Results   Component Value Date     04/12/2021     No components found for: LD  Lab Results   Component Value Date    TSHHS 1.980 01/14/2018       Immunology:  Lab Results   Component Value Date    PROT 6.8 04/12/2021    ALBUMINELP 3.4 11/09/2017    LABALPH 0.4 11/09/2017    LABALPH 0.9 11/09/2017    LABBETA 1.2 11/09/2017    GAMGLOB 2.3 (H) 11/09/2017     No results found for: Shoshana Tamayo, KLFLCR  No results found for: B2M    Coagulation Panel:  Lab Results   Component Value Date    PROTIME 12.9 (H) 02/20/2018    INR 1.13 02/20/2018    APTT 49.7 (H) 11/13/2017       Anemia Panel:  Lab Results   Component Value Date    UGHQZPWI12 808.7 01/14/2018    FOLATE 5.8 01/14/2018 Tumor Markers:  Lab Results   Component Value Date    PSA <0.1 04/12/2021         Imaging: Reviewed     Pathology:Reviewed     Observations:  Performance Status: ECOG 1  Depression Status: No data recorded          Assessment & Plan:  78 yo male admitted for ARF and hyperkalemia found to Bilateral hydroureteronephrosis and pulmonary nodules and blastic lesions within the thoracic and lumbar spine With metastatic prostate cancer Found on biopsy of L5 with .3   -CT CAP feb 2018 shows osseous disease, MRI left hip October 2020 with possible osseous disease and left hip effusion. -NBS Jan 2021 with possible mets vs arthritis. April 2021 PSA <0.1.   -cont enzalumatide + prednisone+Tamsulosine. Continue GnRH analogue  -Recheck psa in three months. Anemia: NC. Slight decline in Hb. Most probably contributed by AOKD, underlying bone mets. Further w/u next visit per pt.    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 Ambrosio     HTN, well controlled  -PCP following    Continue other medical care    Discussed the above findings and plan with him and he verbalized understanding    Recommend follow up with PCP and other specialists    Discussed healthy LS, helathy diet and recommend being uptodate with age appropriate screening tools    RTC  September 13 2021 or earlier if new sx    I have recommended that the patient follow CDC guidelines for prevention of COVID-19 infection.     [address]           Electronically signed by Dariel Andrews MD on 5/3/2021 at 2:44 PM

## 2021-05-04 NOTE — PROGRESS NOTES
Called patient and he wants to wait on an appointment cause he is following with his cancer physician. Which he thinks is the most important right now. I gave him the information for the new office and he stated he will call us when he is ready.

## 2021-05-25 DIAGNOSIS — N18.32 STAGE 3B CHRONIC KIDNEY DISEASE (HCC): ICD-10-CM

## 2021-05-25 DIAGNOSIS — Z79.4 TYPE 2 DIABETES MELLITUS WITHOUT COMPLICATION, WITH LONG-TERM CURRENT USE OF INSULIN (HCC): Chronic | ICD-10-CM

## 2021-05-25 DIAGNOSIS — I10 ESSENTIAL HYPERTENSION, BENIGN: Chronic | ICD-10-CM

## 2021-05-25 DIAGNOSIS — C61 PROSTATE CANCER (HCC): ICD-10-CM

## 2021-05-25 DIAGNOSIS — E11.9 TYPE 2 DIABETES MELLITUS WITHOUT COMPLICATION, WITH LONG-TERM CURRENT USE OF INSULIN (HCC): Chronic | ICD-10-CM

## 2021-05-25 DIAGNOSIS — E87.5 HYPERKALEMIA: ICD-10-CM

## 2021-05-25 LAB
ALBUMIN SERPL-MCNC: 4.3 G/DL (ref 3.4–5)
ANION GAP SERPL CALCULATED.3IONS-SCNC: 13 MMOL/L (ref 3–16)
BACTERIA: ABNORMAL /HPF
BILIRUBIN URINE: NEGATIVE
BLOOD, URINE: ABNORMAL
BUN BLDV-MCNC: 45 MG/DL (ref 7–20)
CALCIUM SERPL-MCNC: 9.3 MG/DL (ref 8.3–10.6)
CHLORIDE BLD-SCNC: 104 MMOL/L (ref 99–110)
CLARITY: CLEAR
CO2: 24 MMOL/L (ref 21–32)
COLOR: YELLOW
CREAT SERPL-MCNC: 2.7 MG/DL (ref 0.8–1.3)
CREATININE URINE: 69.5 MG/DL (ref 39–259)
EPITHELIAL CELLS, UA: 0 /HPF (ref 0–5)
GFR AFRICAN AMERICAN: 28
GFR NON-AFRICAN AMERICAN: 23
GLUCOSE BLD-MCNC: 114 MG/DL (ref 70–99)
GLUCOSE URINE: NEGATIVE MG/DL
HYALINE CASTS: 1 /LPF (ref 0–8)
KETONES, URINE: NEGATIVE MG/DL
LEUKOCYTE ESTERASE, URINE: ABNORMAL
MAGNESIUM: 2 MG/DL (ref 1.8–2.4)
MICROSCOPIC EXAMINATION: YES
NITRITE, URINE: NEGATIVE
PH UA: 6 (ref 5–8)
PHOSPHORUS: 3 MG/DL (ref 2.5–4.9)
POTASSIUM SERPL-SCNC: 4.7 MMOL/L (ref 3.5–5.1)
PROTEIN PROTEIN: 31 MG/DL
PROTEIN UA: 30 MG/DL
PROTEIN/CREAT RATIO: 0.4 MG/DL
RBC UA: 7 /HPF (ref 0–4)
SODIUM BLD-SCNC: 141 MMOL/L (ref 136–145)
SPECIFIC GRAVITY UA: 1.01 (ref 1–1.03)
URINE TYPE: ABNORMAL
UROBILINOGEN, URINE: 0.2 E.U./DL
WBC UA: 27 /HPF (ref 0–5)

## 2021-06-11 PROBLEM — N18.4 CHRONIC KIDNEY DISEASE, STAGE IV (SEVERE) (HCC): Status: ACTIVE | Noted: 2021-06-11

## 2021-06-28 ENCOUNTER — HOSPITAL ENCOUNTER (OUTPATIENT)
Dept: INFUSION THERAPY | Age: 71
Discharge: HOME | End: 2021-06-28
Payer: COMMERCIAL

## 2021-06-28 VITALS
OXYGEN SATURATION: 98 % | DIASTOLIC BLOOD PRESSURE: 76 MMHG | RESPIRATION RATE: 16 BRPM | SYSTOLIC BLOOD PRESSURE: 128 MMHG | HEART RATE: 83 BPM

## 2021-06-28 DIAGNOSIS — C79.51 SECONDARY MALIGNANT NEOPLASM OF BONE AND BONE MARROW (HCC): ICD-10-CM

## 2021-06-28 DIAGNOSIS — C61 PROSTATE CANCER (HCC): Primary | ICD-10-CM

## 2021-06-28 DIAGNOSIS — C79.52 SECONDARY MALIGNANT NEOPLASM OF BONE AND BONE MARROW (HCC): ICD-10-CM

## 2021-06-28 LAB
ALBUMIN SERPL-MCNC: 4.4 GM/DL (ref 3.4–5)
ALP BLD-CCNC: 121 IU/L (ref 40–129)
ALT SERPL-CCNC: 8 U/L (ref 10–40)
ANION GAP SERPL CALCULATED.3IONS-SCNC: 11 MMOL/L (ref 4–16)
AST SERPL-CCNC: 14 IU/L (ref 15–37)
BASOPHILS ABSOLUTE: 0 K/CU MM
BASOPHILS RELATIVE PERCENT: 0.1 % (ref 0–1)
BILIRUB SERPL-MCNC: 0.4 MG/DL (ref 0–1)
BUN BLDV-MCNC: 33 MG/DL (ref 6–23)
CALCIUM SERPL-MCNC: 8.6 MG/DL (ref 8.3–10.6)
CHLORIDE BLD-SCNC: 105 MMOL/L (ref 99–110)
CO2: 24 MMOL/L (ref 21–32)
CREAT SERPL-MCNC: 2 MG/DL (ref 0.9–1.3)
DIFFERENTIAL TYPE: ABNORMAL
EOSINOPHILS ABSOLUTE: 0.2 K/CU MM
EOSINOPHILS RELATIVE PERCENT: 2.6 % (ref 0–3)
FERRITIN: 1010 NG/ML (ref 30–400)
FOLATE: 5.7 NG/ML (ref 3.1–17.5)
GFR AFRICAN AMERICAN: 40 ML/MIN/1.73M2
GFR NON-AFRICAN AMERICAN: 33 ML/MIN/1.73M2
GLUCOSE BLD-MCNC: 125 MG/DL (ref 70–99)
HCT VFR BLD CALC: 34.8 % (ref 42–52)
HEMOGLOBIN: 11.2 GM/DL (ref 13.5–18)
IRON: 66 UG/DL (ref 59–158)
LACTATE DEHYDROGENASE: 144 IU/L (ref 120–246)
LYMPHOCYTES ABSOLUTE: 1.5 K/CU MM
LYMPHOCYTES RELATIVE PERCENT: 17.8 % (ref 24–44)
MCH RBC QN AUTO: 31.5 PG (ref 27–31)
MCHC RBC AUTO-ENTMCNC: 32.2 % (ref 32–36)
MCV RBC AUTO: 98 FL (ref 78–100)
MONOCYTES ABSOLUTE: 0.5 K/CU MM
MONOCYTES RELATIVE PERCENT: 6.4 % (ref 0–4)
PCT TRANSFERRIN: 20 % (ref 10–44)
PDW BLD-RTO: 12.6 % (ref 11.7–14.9)
PLATELET # BLD: 206 K/CU MM (ref 140–440)
PMV BLD AUTO: 8.4 FL (ref 7.5–11.1)
POTASSIUM SERPL-SCNC: 3.7 MMOL/L (ref 3.5–5.1)
RBC # BLD: 3.55 M/CU MM (ref 4.6–6.2)
SEGMENTED NEUTROPHILS ABSOLUTE COUNT: 6.1 K/CU MM
SEGMENTED NEUTROPHILS RELATIVE PERCENT: 73.1 % (ref 36–66)
SODIUM BLD-SCNC: 140 MMOL/L (ref 135–145)
TOTAL IRON BINDING CAPACITY: 332 UG/DL (ref 250–450)
TOTAL PROTEIN: 6.8 GM/DL (ref 6.4–8.2)
UNSATURATED IRON BINDING CAPACITY: 266 UG/DL (ref 110–370)
VITAMIN B-12: 873.7 PG/ML (ref 211–911)
WBC # BLD: 8.4 K/CU MM (ref 4–10.5)

## 2021-06-28 PROCEDURE — 82746 ASSAY OF FOLIC ACID SERUM: CPT

## 2021-06-28 PROCEDURE — 82728 ASSAY OF FERRITIN: CPT

## 2021-06-28 PROCEDURE — 85025 COMPLETE CBC W/AUTO DIFF WBC: CPT

## 2021-06-28 PROCEDURE — 84165 PROTEIN E-PHORESIS SERUM: CPT

## 2021-06-28 PROCEDURE — 82607 VITAMIN B-12: CPT

## 2021-06-28 PROCEDURE — 36415 COLL VENOUS BLD VENIPUNCTURE: CPT

## 2021-06-28 PROCEDURE — 83540 ASSAY OF IRON: CPT

## 2021-06-28 PROCEDURE — 6360000002 HC RX W HCPCS: Performed by: INTERNAL MEDICINE

## 2021-06-28 PROCEDURE — 84154 ASSAY OF PSA FREE: CPT

## 2021-06-28 PROCEDURE — 84153 ASSAY OF PSA TOTAL: CPT

## 2021-06-28 PROCEDURE — 96402 CHEMO HORMON ANTINEOPL SQ/IM: CPT

## 2021-06-28 PROCEDURE — 83550 IRON BINDING TEST: CPT

## 2021-06-28 PROCEDURE — 80053 COMPREHEN METABOLIC PANEL: CPT

## 2021-06-28 PROCEDURE — 83615 LACTATE (LD) (LDH) ENZYME: CPT

## 2021-06-28 RX ADMIN — LEUPROLIDE ACETATE 22.5 MG: KIT SUBCUTANEOUS at 09:23

## 2021-06-30 LAB
ALBUMIN ELP: 3.5 GM/DL (ref 3.2–5.6)
ALPHA-1-GLOBULIN: 0.3 GM/DL (ref 0.1–0.4)
ALPHA-2-GLOBULIN: 0.8 GM/DL (ref 0.4–1.2)
BETA GLOBULIN: 1 GM/DL (ref 0.5–1.3)
GAMMA GLOBULIN: 1.2 GM/DL (ref 0.5–1.6)
PROSTATE SPECIFIC ANTIGEN FREE: <0.1 NG/ML
PROSTATE SPECIFIC ANTIGEN PERCENT FREE: NORMAL %
PROSTATE SPECIFIC ANTIGEN: <0.1 NG/ML (ref 0–4)
SPEP INTERPRETATION: NORMAL
TOTAL PROTEIN: 6.8 GM/DL (ref 6.4–8.2)

## 2021-07-06 ENCOUNTER — APPOINTMENT (OUTPATIENT)
Dept: INFUSION THERAPY | Age: 71
End: 2021-07-06
Payer: COMMERCIAL

## 2021-08-13 DIAGNOSIS — C79.51 SECONDARY MALIGNANT NEOPLASM OF BONE AND BONE MARROW (HCC): Primary | ICD-10-CM

## 2021-08-13 DIAGNOSIS — C79.52 SECONDARY MALIGNANT NEOPLASM OF BONE AND BONE MARROW (HCC): Primary | ICD-10-CM

## 2021-08-13 DIAGNOSIS — C61 PROSTATE CANCER (HCC): ICD-10-CM

## 2021-08-13 RX ORDER — OXYCODONE HYDROCHLORIDE AND ACETAMINOPHEN 5; 325 MG/1; MG/1
1 TABLET ORAL EVERY 6 HOURS PRN
Qty: 120 TABLET | Refills: 0 | Status: SHIPPED | OUTPATIENT
Start: 2021-08-13 | End: 2022-01-31 | Stop reason: SDUPTHER

## 2021-08-13 NOTE — TELEPHONE ENCOUNTER
Pt left message requesting a refill for Oxycodone. He has been out of town and is now back and needs a refill. Sent message to Dr. John Alvarez to send in refill.

## 2021-09-08 ENCOUNTER — HOSPITAL ENCOUNTER (OUTPATIENT)
Dept: INFUSION THERAPY | Age: 71
Discharge: HOME | End: 2021-09-08
Payer: COMMERCIAL

## 2021-09-08 DIAGNOSIS — C79.52 SECONDARY MALIGNANT NEOPLASM OF BONE AND BONE MARROW (HCC): ICD-10-CM

## 2021-09-08 DIAGNOSIS — C79.51 SECONDARY MALIGNANT NEOPLASM OF BONE AND BONE MARROW (HCC): ICD-10-CM

## 2021-09-08 DIAGNOSIS — C61 PROSTATE CANCER (HCC): ICD-10-CM

## 2021-09-08 LAB
ALBUMIN SERPL-MCNC: 4.2 GM/DL (ref 3.4–5)
ALP BLD-CCNC: 138 IU/L (ref 40–129)
ALT SERPL-CCNC: 9 U/L (ref 10–40)
ANION GAP SERPL CALCULATED.3IONS-SCNC: 9 MMOL/L (ref 4–16)
AST SERPL-CCNC: 14 IU/L (ref 15–37)
BASOPHILS ABSOLUTE: 0 K/CU MM
BASOPHILS RELATIVE PERCENT: 0.2 % (ref 0–1)
BILIRUB SERPL-MCNC: 0.4 MG/DL (ref 0–1)
BUN BLDV-MCNC: 36 MG/DL (ref 6–23)
CALCIUM SERPL-MCNC: 9.1 MG/DL (ref 8.3–10.6)
CHLORIDE BLD-SCNC: 102 MMOL/L (ref 99–110)
CO2: 27 MMOL/L (ref 21–32)
CREAT SERPL-MCNC: 2.1 MG/DL (ref 0.9–1.3)
DIFFERENTIAL TYPE: ABNORMAL
EOSINOPHILS ABSOLUTE: 0.4 K/CU MM
EOSINOPHILS RELATIVE PERCENT: 3.6 % (ref 0–3)
FERRITIN: 1081 NG/ML (ref 30–400)
FOLATE: 4.2 NG/ML (ref 3.1–17.5)
GFR AFRICAN AMERICAN: 38 ML/MIN/1.73M2
GFR NON-AFRICAN AMERICAN: 31 ML/MIN/1.73M2
GLUCOSE BLD-MCNC: 95 MG/DL (ref 70–99)
HCT VFR BLD CALC: 35.7 % (ref 42–52)
HEMOGLOBIN: 11.4 GM/DL (ref 13.5–18)
IRON: 65 UG/DL (ref 59–158)
LYMPHOCYTES ABSOLUTE: 2 K/CU MM
LYMPHOCYTES RELATIVE PERCENT: 20.5 % (ref 24–44)
MCH RBC QN AUTO: 31.2 PG (ref 27–31)
MCHC RBC AUTO-ENTMCNC: 31.9 % (ref 32–36)
MCV RBC AUTO: 97.8 FL (ref 78–100)
MONOCYTES ABSOLUTE: 0.7 K/CU MM
MONOCYTES RELATIVE PERCENT: 7.1 % (ref 0–4)
PCT TRANSFERRIN: 19 % (ref 10–44)
PDW BLD-RTO: 12.7 % (ref 11.7–14.9)
PLATELET # BLD: 221 K/CU MM (ref 140–440)
PMV BLD AUTO: 8.4 FL (ref 7.5–11.1)
POTASSIUM SERPL-SCNC: 4.7 MMOL/L (ref 3.5–5.1)
RBC # BLD: 3.65 M/CU MM (ref 4.6–6.2)
SEGMENTED NEUTROPHILS ABSOLUTE COUNT: 6.6 K/CU MM
SEGMENTED NEUTROPHILS RELATIVE PERCENT: 68.6 % (ref 36–66)
SODIUM BLD-SCNC: 138 MMOL/L (ref 135–145)
TOTAL IRON BINDING CAPACITY: 342 UG/DL (ref 250–450)
TOTAL PROTEIN: 7.3 GM/DL (ref 6.4–8.2)
UNSATURATED IRON BINDING CAPACITY: 277 UG/DL (ref 110–370)
VITAMIN B-12: 815.1 PG/ML (ref 211–911)
WBC # BLD: 9.6 K/CU MM (ref 4–10.5)

## 2021-09-08 PROCEDURE — 83540 ASSAY OF IRON: CPT

## 2021-09-08 PROCEDURE — 82607 VITAMIN B-12: CPT

## 2021-09-08 PROCEDURE — 80053 COMPREHEN METABOLIC PANEL: CPT

## 2021-09-08 PROCEDURE — 82746 ASSAY OF FOLIC ACID SERUM: CPT

## 2021-09-08 PROCEDURE — 84153 ASSAY OF PSA TOTAL: CPT

## 2021-09-08 PROCEDURE — 82728 ASSAY OF FERRITIN: CPT

## 2021-09-08 PROCEDURE — 83550 IRON BINDING TEST: CPT

## 2021-09-08 PROCEDURE — 84154 ASSAY OF PSA FREE: CPT

## 2021-09-08 PROCEDURE — 85025 COMPLETE CBC W/AUTO DIFF WBC: CPT

## 2021-09-08 PROCEDURE — 36415 COLL VENOUS BLD VENIPUNCTURE: CPT

## 2021-09-09 RX ORDER — ENZALUTAMIDE 80 MG/1
TABLET ORAL
Qty: 60 TABLET | Refills: 2 | Status: SHIPPED | OUTPATIENT
Start: 2021-09-09 | End: 2021-12-23 | Stop reason: SDUPTHER

## 2021-09-13 ENCOUNTER — OFFICE VISIT (OUTPATIENT)
Dept: ONCOLOGY | Age: 71
End: 2021-09-13
Payer: COMMERCIAL

## 2021-09-13 ENCOUNTER — HOSPITAL ENCOUNTER (OUTPATIENT)
Dept: INFUSION THERAPY | Age: 71
Discharge: HOME | End: 2021-09-13
Payer: COMMERCIAL

## 2021-09-13 VITALS
OXYGEN SATURATION: 96 % | WEIGHT: 237 LBS | DIASTOLIC BLOOD PRESSURE: 72 MMHG | BODY MASS INDEX: 30.42 KG/M2 | TEMPERATURE: 97.1 F | HEIGHT: 74 IN | HEART RATE: 79 BPM | SYSTOLIC BLOOD PRESSURE: 126 MMHG

## 2021-09-13 DIAGNOSIS — C79.51 SECONDARY MALIGNANT NEOPLASM OF BONE AND BONE MARROW (HCC): ICD-10-CM

## 2021-09-13 DIAGNOSIS — C79.52 SECONDARY MALIGNANT NEOPLASM OF BONE AND BONE MARROW (HCC): ICD-10-CM

## 2021-09-13 DIAGNOSIS — C61 PROSTATE CANCER (HCC): Primary | ICD-10-CM

## 2021-09-13 DIAGNOSIS — Z12.2 ENCOUNTER FOR SCREENING FOR MALIGNANT NEOPLASM OF LUNG: ICD-10-CM

## 2021-09-13 PROCEDURE — 99214 OFFICE O/P EST MOD 30 MIN: CPT | Performed by: INTERNAL MEDICINE

## 2021-09-13 PROCEDURE — 99211 OFF/OP EST MAY X REQ PHY/QHP: CPT

## 2021-09-13 RX ORDER — TAMSULOSIN HYDROCHLORIDE 0.4 MG/1
0.4 CAPSULE ORAL DAILY
Qty: 90 CAPSULE | Refills: 5 | Status: SHIPPED | OUTPATIENT
Start: 2021-09-13 | End: 2022-10-20

## 2021-09-13 ASSESSMENT — PATIENT HEALTH QUESTIONNAIRE - PHQ9
2. FEELING DOWN, DEPRESSED OR HOPELESS: 0
1. LITTLE INTEREST OR PLEASURE IN DOING THINGS: 0
SUM OF ALL RESPONSES TO PHQ QUESTIONS 1-9: 0
SUM OF ALL RESPONSES TO PHQ9 QUESTIONS 1 & 2: 0

## 2021-09-13 NOTE — PROGRESS NOTES
MA Rooming Questions  Patient: Tiffany Hernandez  MRN: [id number]    Date: 9/13/2021        1. Do you have any new issues? yes - tired alot         2. Do you need any refills on medications?    no    3. Have you had any imaging done since your last visit?   no    4. Have you been hospitalized or seen in the emergency room since your last visit here?   no    5. Did the patient have a depression screening completed today?  Yes    PHQ-9 Total Score: 0 (9/13/2021  2:53 PM)       PHQ-9 Given to (if applicable):               PHQ-9 Score (if applicable):                     [] Positive     [x]  Negative              Does question #9 need addressed (if applicable)                     [] Yes    []  No               [facility]

## 2021-09-13 NOTE — PROGRESS NOTES
Patient Name: Darius Churchill  Patient : 1950  Patient MRN: [id number]     Primary Oncologist: Chuckie Boyle MD  Referring Physician: Emma Draper MD, Jw Delarosa   Urologist: Dr Mauri Baxter       Date of Service: 2021      Chief Complaint:   Chief Complaint   Patient presents with    Follow-up        Active Problem list  1. Prostate cancer ([address]    2. 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 A vazquez was placed and he has been hydrated. Renal and urology are on board. Has some right shoulder pain. Lost 20 lbs in the past few months. ONC history  Hx of prostate cancer s/p XRT  Treated at [facility]  October 10, 2017 showed multiple pleural-based lung nodules of bilateral lung bases with the largest measuring 1.2 cm of posterior right lung base new since 2014. 2017 admission for hyperkalemia acute renal failure, CT head negative for intracranial abnormality. CT abdomen pelvis showed multiple lung base pulmonary nodules. Blastic lesions within the visualized thoracic and lumbar spine. Findings concerning for metastatic disease. Progressive bilateral hydroureteronephrosis without evidence for obstructing calculus  November 10, 2017 bone scan showed multifocal axial and appendicular skeletal disease. Casodex was started.  PSA was 112.3  2017 Bone biopsy showed metastatic adenocarcinoma the prostate Based on L5 bone biopsy   saw DR Elaine Cruz)   lupron started  dec 2017 s/p EGD essentially normal  dec 13,2017 started cycle #1 docetaxel, received 3C until  Admitted for hyperkalemia, underwent a temporary hemodialysis, PSA was 3  Then placed on Zytiga until 2018  feb 2018 CT CAP showed Osteoblastic skeletal metastatic disease. Tiny pulmonary nodules are almost certainly postinflammatory. No definite  findings of intrathoracic or intra abdominopelvic metastatic disease and he also developed some neuropathy in his left 5th digit, PSA < 2   june 2018 = 0.1  August 2018 he started taking Xtandi  august 2018 = 0.1  december 2018 PSA 0.04   3-19 pSA 0.01  6-19 PSA < 0.1   9-19 PSA < 0.1  12/27/2019: PSA <0.1    9/30/20: MRI Hip:  Marrow replacing osseous lesions seen in the bilateral superior acetabulum    and left iliac bone suspicous for osseous metastases.  Lesions in the left    superior acetabulum and left iliac bone are included in the field of view on    the post-contrast sequences and demonstrate heterogeneous enhancement.  There    is an additional peripherally sclerotic lesion in the left subtrochanteric    proximal femur without definite internal enhancement but demonstrates    peripheral enhancement.  These findings could be further assessed with a    nuclear medicine bone scan.         Severe left hip joint osteoarthrosis with bony remodeling of the femoral    head.  There is a large left hip joint effusion with a lobular T2    hyperintense lesion at the posterosuperior margin of the left hip joint. This appears chronic in nature and could represent synovial    proliferation/synovitis.  This would have an atypical appearance for    metastatic disease.         Generalized edema in the left adductor, obturator, and gluteus minimus    musculature.         Fluid bright signal in the region of the quadratus femoris muscle could    represent ischiofemoral bursitis. 1/21/21:NBS  Activity is demonstrated at left greater than right acetabulum, compatible   with that marrow signal abnormality on recent MR, and can reflect osseous   metastatic disease given suspicion for such on MR.  Additional considerations   which can have this bone scan appearance include arthropathy or osteomyelitis.       Asymmetric focal activity within the proximal left tibial diaphysis, which   can be due to healing trauma or metastatic disease.  If patient is focally   symptomatic, radiograph could be considered.       Multifocal degenerative changes are otherwise favored as outlined above. 1/24/21: PSA <0.1    PMH: CAD, HTN, HLP, DM, CKD    PSH: upper and lower endoscopy    SH: Lives alone. Three boys. Quit smoking long time ago but started smoking 1PP two and half days. Prior to quitting in 2014 he smoked 1-2 ppd for 40 years. Also h/O ETOH abuse. No other illicit drug abuse    FH: Whole family , his father had 8 sibling and all had cancer, does know what cancer. Has a brother and sister but no cancer diagnosis. Interval History  9/13/2021:Arrived alone to the clinic. Feels very weak and tired. Denied any hot flashes. No new bone pains. Overall feels OK other than pain in the left hip and bilateral knee pains. Is being followed by Ortho. No back pain. Has been taking Xtandi. No chest pain, increased sob, palpitations or any dizziness. No abdominal pain, nausea, emesis,diarrhea or any constipation. No  sx. No LE edema.      Review of Systems   Per interval history; otherwise 10 point ROS is negative              Vital Signs:  /72 (Site: Right Upper Arm, Position: Sitting, Cuff Size: Medium Adult)   Pulse 79   Temp 97.1 °F (36.2 °C) (Infrared)   Ht 6' 2\" (1.88 m)   Wt 237 lb (107.5 kg)   SpO2 96%   BMI 30.43 kg/m²     Physical Exam:    CONSTITUTIONAL: awake, alert, tired appearing  EYES: No palor or any icterus  ENT: ATNC  NECK: No JVD  HEMATOLOGIC/LYMPHATIC: no cervical, supraclavicular or axillary lymphadenopathy   LUNGS: CTAB  CARDIOVASCULAR: s1s2 rrr  ABDOMEN: normal bowel sounds x 4, soft, non-distended, non-tender, no masses palpated, no hepatosplenomegaly   MUSCULOSKELETAL: left knee swelling  NEUROLOGIC: GI  SKIN: No rash      Labs:    Hematology:  Lab Results   Component Value Date    WBC 9.6 09/08/2021    RBC 3.65 (L) 09/08/2021    HGB 11.4 (L) 09/08/2021    HCT 35.7 (L) 09/08/2021    MCV 97.8 09/08/2021    MCH 31.2 (H) 09/08/2021    MCHC 31.9 (L) 09/08/2021    RDW 12.7 09/08/2021     09/08/2021    MPV 8.4 09/08/2021    BANDSPCT 4 (L) 10/08/2017    SEGSPCT 68.6 (H) 09/08/2021    EOSRELPCT 3.6 (H) 09/08/2021    BASOPCT 0.2 09/08/2021    LYMPHOPCT 20.5 (L) 09/08/2021    MONOPCT 7.1 (H) 09/08/2021    BANDABS 0.64 10/08/2017    SEGSABS 6.6 09/08/2021    EOSABS 0.4 09/08/2021    BASOSABS 0.0 09/08/2021    LYMPHSABS 2.0 09/08/2021    MONOSABS 0.7 09/08/2021    DIFFTYPE AUTOMATED DIFFERENTIAL 09/08/2021    ANISOCYTOSIS 1+ 10/08/2017    POLYCHROM 1+ 10/08/2017     No results found for: ESR    Chemistry:  Lab Results   Component Value Date     09/08/2021    K 4.7 09/08/2021     09/08/2021    CO2 27 09/08/2021    BUN 36 (H) 09/08/2021    CREATININE 2.1 (H) 09/08/2021    GLUCOSE 95 09/08/2021    CALCIUM 9.1 09/08/2021    PROT 7.3 09/08/2021    LABALBU 4.2 09/08/2021    BILITOT 0.4 09/08/2021    ALKPHOS 138 (H) 09/08/2021    AST 14 (L) 09/08/2021    ALT 9 (L) 09/08/2021    LABGLOM 31 (L) 09/08/2021    GFRAA 38 (L) 09/08/2021    AGRATIO 1.2 01/07/2019    GLOB 3.4 01/07/2019    PHOS 3.0 05/25/2021    MG 2.00 05/25/2021    POCGLU 109 (H) 01/15/2018     Lab Results   Component Value Date     06/28/2021     No components found for: LD  Lab Results   Component Value Date    TSHHS 1.980 01/14/2018       Immunology:  Lab Results   Component Value Date    PROT 7.3 09/08/2021    SPEP  06/28/2021     INTERPRETATION - Within normal limits.   Hal Mcdonald MD    ALBUMINELP 3.5 06/28/2021    LABALPH 0.3 06/28/2021    LABALPH 0.8 06/28/2021    LABBETA 1.0 06/28/2021    GAMGLOB 1.2 06/28/2021     No results found for: VIRGILIO FortuneLCR  No results found for: B2M    Coagulation Panel:  Lab Results   Component Value Date    PROTIME 12.9 (H) 02/20/2018    INR 1.13 02/20/2018    APTT 49.7 (H) 11/13/2017       Anemia Panel:  Lab Results   Component Value Date    FEUQRTWX82 815.1 09/08/2021    FOLATE 4.2 09/08/2021       Tumor Markers:  Lab Results   Component Value Date    PSA <0.1 09/08/2021         Imaging: Reviewed     Pathology:Reviewed     Observations:  Performance Status: ECOG 1  Depression Status: PHQ-9 Total Score: 0 (9/13/2021  2:53 PM)          Assessment & Plan:  71 yo male admitted for ARF and hyperkalemia found to Bilateral hydroureteronephrosis and pulmonary nodules and blastic lesions within the thoracic and lumbar spine With metastatic prostate cancer Found on biopsy of L5 with .3   -CT CAP feb 2018 shows osseous disease, MRI left hip October 2020 with possible osseous disease and left hip effusion. -NBS Jan 2021 with possible mets vs arthritis. -Sep 2021 PSA <0.1.   -cont enzalumatide + +Tamsulosine. Continue GnRH analogue q 3M  -Recheck psa in three months in dec 2021    Anemia/elevated ferritin: NC. Slight decline in Hb. Most probably contributed by AOKD, underlying bone mets. No evidence of monoclonal gammopathy. Note ferritin elevated, may have underlying MDS with RS. No liver disease. Defers further evaluation including BMB/aspiration. 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 dexa scan ordered    CKD stage III  -recs per DR. Ambrosio     HTN, well controlled  -PCP following    Continue other medical care    Discussed the above findings and plan with him and he verbalized understanding    Recommend follow up with PCP and other specialists    Discussed healthy LS, helathy diet and recommend being uptodate with age appropriate screening tool. CT screening lung Nov 2021 ordered    RTC  Dec 2022 or earlier if new sx    I have recommended that the patient follow CDC guidelines for prevention of COVID-19 infection. Receiving. COVID vaccine 9/14/21.     [address]           Electronically signed by Sekou Sheikh MD on 9/13/2021 at 2:58 PM

## 2021-09-15 DIAGNOSIS — T38.7X5A OSTEOPOROSIS DUE TO ANDROGEN THERAPY: Primary | ICD-10-CM

## 2021-09-15 DIAGNOSIS — M81.8 OSTEOPOROSIS DUE TO ANDROGEN THERAPY: Primary | ICD-10-CM

## 2021-09-17 ENCOUNTER — TELEPHONE (OUTPATIENT)
Dept: ONCOLOGY | Age: 71
End: 2021-09-17

## 2021-09-17 NOTE — TELEPHONE ENCOUNTER
Patient on phone stating he is experiencing pain in his abdomen. Stated he got his Covid in one arm and then  Flu in one arm. Patient is having left abdominal pain and would like to get a CT abdomen to get it all check out. Please enter order so it can be added to his LDCT.

## 2021-09-28 ENCOUNTER — HOSPITAL ENCOUNTER (OUTPATIENT)
Dept: INFUSION THERAPY | Age: 71
Discharge: HOME | End: 2021-09-28
Payer: COMMERCIAL

## 2021-09-28 DIAGNOSIS — C61 PROSTATE CANCER (HCC): Primary | ICD-10-CM

## 2021-09-28 PROCEDURE — 6360000002 HC RX W HCPCS: Performed by: INTERNAL MEDICINE

## 2021-09-28 PROCEDURE — 96402 CHEMO HORMON ANTINEOPL SQ/IM: CPT

## 2021-09-28 RX ADMIN — LEUPROLIDE ACETATE 22.5 MG: KIT SUBCUTANEOUS at 14:30

## 2021-09-28 NOTE — PROGRESS NOTES
Patient ambulated to treatment room with assist of cane. Gait slow and steady. States he has chronic left hip pain. States \"It only hurts when I'm standing or walking. \" States he sits a lot at home. Lupron injection given. Next appointments given.

## 2021-09-29 ENCOUNTER — HOSPITAL ENCOUNTER (OUTPATIENT)
Dept: CT IMAGING | Age: 71
Discharge: HOME | End: 2021-09-29
Payer: COMMERCIAL

## 2021-09-29 ENCOUNTER — HOSPITAL ENCOUNTER (OUTPATIENT)
Dept: WOMENS IMAGING | Age: 71
Discharge: HOME | End: 2021-09-29
Payer: COMMERCIAL

## 2021-09-29 DIAGNOSIS — T38.7X5A OSTEOPOROSIS DUE TO ANDROGEN THERAPY: ICD-10-CM

## 2021-09-29 DIAGNOSIS — Z12.2 ENCOUNTER FOR SCREENING FOR MALIGNANT NEOPLASM OF LUNG: ICD-10-CM

## 2021-09-29 DIAGNOSIS — M81.8 OSTEOPOROSIS DUE TO ANDROGEN THERAPY: ICD-10-CM

## 2021-09-29 PROCEDURE — 71250 CT THORAX DX C-: CPT

## 2021-09-29 PROCEDURE — 77080 DXA BONE DENSITY AXIAL: CPT

## 2021-10-20 DIAGNOSIS — C61 PROSTATE CANCER (HCC): ICD-10-CM

## 2021-10-20 DIAGNOSIS — E43 SEVERE MALNUTRITION (HCC): Chronic | ICD-10-CM

## 2021-10-20 DIAGNOSIS — N18.4 CHRONIC KIDNEY DISEASE, STAGE IV (SEVERE) (HCC): ICD-10-CM

## 2021-10-20 DIAGNOSIS — E11.9 TYPE 2 DIABETES MELLITUS WITHOUT COMPLICATION, WITH LONG-TERM CURRENT USE OF INSULIN (HCC): Chronic | ICD-10-CM

## 2021-10-20 DIAGNOSIS — I10 ESSENTIAL HYPERTENSION, BENIGN: Chronic | ICD-10-CM

## 2021-10-20 DIAGNOSIS — Z79.4 TYPE 2 DIABETES MELLITUS WITHOUT COMPLICATION, WITH LONG-TERM CURRENT USE OF INSULIN (HCC): Chronic | ICD-10-CM

## 2021-10-20 DIAGNOSIS — E87.5 HYPERKALEMIA: ICD-10-CM

## 2021-10-20 LAB
ALBUMIN SERPL-MCNC: 4.4 G/DL (ref 3.4–5)
ANION GAP SERPL CALCULATED.3IONS-SCNC: 14 MMOL/L (ref 3–16)
BACTERIA: ABNORMAL /HPF
BILIRUBIN URINE: NEGATIVE
BLOOD, URINE: ABNORMAL
BUN BLDV-MCNC: 38 MG/DL (ref 7–20)
CALCIUM SERPL-MCNC: 9.2 MG/DL (ref 8.3–10.6)
CHLORIDE BLD-SCNC: 103 MMOL/L (ref 99–110)
CLARITY: ABNORMAL
CO2: 23 MMOL/L (ref 21–32)
COLOR: YELLOW
COMMENT UA: ABNORMAL
CREAT SERPL-MCNC: 2.3 MG/DL (ref 0.8–1.3)
CREATININE URINE: 142.5 MG/DL (ref 39–259)
EPITHELIAL CELLS, UA: 0 /HPF (ref 0–5)
GFR AFRICAN AMERICAN: 34
GFR NON-AFRICAN AMERICAN: 28
GLUCOSE BLD-MCNC: 115 MG/DL (ref 70–99)
GLUCOSE URINE: NEGATIVE MG/DL
HYALINE CASTS: 1 /LPF (ref 0–8)
KETONES, URINE: NEGATIVE MG/DL
LEUKOCYTE ESTERASE, URINE: ABNORMAL
MAGNESIUM: 2 MG/DL (ref 1.8–2.4)
MICROSCOPIC EXAMINATION: YES
NITRITE, URINE: POSITIVE
PH UA: 6 (ref 5–8)
PHOSPHORUS: 3.3 MG/DL (ref 2.5–4.9)
POTASSIUM SERPL-SCNC: 4.2 MMOL/L (ref 3.5–5.1)
PROTEIN PROTEIN: 49 MG/DL
PROTEIN UA: 30 MG/DL
PROTEIN/CREAT RATIO: 0.3 MG/DL
RBC UA: ABNORMAL /HPF (ref 0–4)
SODIUM BLD-SCNC: 140 MMOL/L (ref 136–145)
SPECIFIC GRAVITY UA: 1.02 (ref 1–1.03)
URINE TYPE: ABNORMAL
UROBILINOGEN, URINE: 0.2 E.U./DL
WBC UA: 105 /HPF (ref 0–5)

## 2021-12-21 ENCOUNTER — HOSPITAL ENCOUNTER (OUTPATIENT)
Dept: INFUSION THERAPY | Age: 71
Discharge: HOME | End: 2021-12-21
Payer: COMMERCIAL

## 2021-12-21 DIAGNOSIS — C79.52 SECONDARY MALIGNANT NEOPLASM OF BONE AND BONE MARROW (HCC): ICD-10-CM

## 2021-12-21 DIAGNOSIS — C79.51 SECONDARY MALIGNANT NEOPLASM OF BONE AND BONE MARROW (HCC): ICD-10-CM

## 2021-12-21 DIAGNOSIS — C61 PROSTATE CANCER (HCC): Primary | ICD-10-CM

## 2021-12-21 DIAGNOSIS — C61 PROSTATE CANCER (HCC): ICD-10-CM

## 2021-12-21 LAB
ALBUMIN SERPL-MCNC: 4.3 GM/DL (ref 3.4–5)
ALP BLD-CCNC: 122 IU/L (ref 40–129)
ALT SERPL-CCNC: 7 U/L (ref 10–40)
ANION GAP SERPL CALCULATED.3IONS-SCNC: 11 MMOL/L (ref 4–16)
AST SERPL-CCNC: 12 IU/L (ref 15–37)
BASOPHILS ABSOLUTE: 0 K/CU MM
BASOPHILS RELATIVE PERCENT: 0.2 % (ref 0–1)
BILIRUB SERPL-MCNC: 0.4 MG/DL (ref 0–1)
BUN BLDV-MCNC: 38 MG/DL (ref 6–23)
CALCIUM SERPL-MCNC: 9 MG/DL (ref 8.3–10.6)
CHLORIDE BLD-SCNC: 106 MMOL/L (ref 99–110)
CO2: 26 MMOL/L (ref 21–32)
CREAT SERPL-MCNC: 2.2 MG/DL (ref 0.9–1.3)
DIFFERENTIAL TYPE: ABNORMAL
EOSINOPHILS ABSOLUTE: 0.3 K/CU MM
EOSINOPHILS RELATIVE PERCENT: 2.4 % (ref 0–3)
FERRITIN: 719 NG/ML (ref 30–400)
GFR AFRICAN AMERICAN: 36 ML/MIN/1.73M2
GFR NON-AFRICAN AMERICAN: 30 ML/MIN/1.73M2
GLUCOSE BLD-MCNC: 106 MG/DL (ref 70–99)
HCT VFR BLD CALC: 35.8 % (ref 42–52)
HEMOGLOBIN: 11.3 GM/DL (ref 13.5–18)
IRON: 46 UG/DL (ref 59–158)
LYMPHOCYTES ABSOLUTE: 2.3 K/CU MM
LYMPHOCYTES RELATIVE PERCENT: 21.8 % (ref 24–44)
MCH RBC QN AUTO: 31.1 PG (ref 27–31)
MCHC RBC AUTO-ENTMCNC: 31.6 % (ref 32–36)
MCV RBC AUTO: 98.6 FL (ref 78–100)
MONOCYTES ABSOLUTE: 0.7 K/CU MM
MONOCYTES RELATIVE PERCENT: 6.5 % (ref 0–4)
PCT TRANSFERRIN: 14 % (ref 10–44)
PDW BLD-RTO: 13.2 % (ref 11.7–14.9)
PLATELET # BLD: 227 K/CU MM (ref 140–440)
PMV BLD AUTO: 8.6 FL (ref 7.5–11.1)
POTASSIUM SERPL-SCNC: 4.4 MMOL/L (ref 3.5–5.1)
RBC # BLD: 3.63 M/CU MM (ref 4.6–6.2)
SEGMENTED NEUTROPHILS ABSOLUTE COUNT: 7.4 K/CU MM
SEGMENTED NEUTROPHILS RELATIVE PERCENT: 69.1 % (ref 36–66)
SODIUM BLD-SCNC: 143 MMOL/L (ref 135–145)
TOTAL IRON BINDING CAPACITY: 323 UG/DL (ref 250–450)
TOTAL PROTEIN: 6.8 GM/DL (ref 6.4–8.2)
UNSATURATED IRON BINDING CAPACITY: 277 UG/DL (ref 110–370)
WBC # BLD: 10.7 K/CU MM (ref 4–10.5)

## 2021-12-21 PROCEDURE — 82728 ASSAY OF FERRITIN: CPT

## 2021-12-21 PROCEDURE — 36415 COLL VENOUS BLD VENIPUNCTURE: CPT

## 2021-12-21 PROCEDURE — 84153 ASSAY OF PSA TOTAL: CPT

## 2021-12-21 PROCEDURE — 83550 IRON BINDING TEST: CPT

## 2021-12-21 PROCEDURE — 85025 COMPLETE CBC W/AUTO DIFF WBC: CPT

## 2021-12-21 PROCEDURE — 83540 ASSAY OF IRON: CPT

## 2021-12-21 PROCEDURE — 96402 CHEMO HORMON ANTINEOPL SQ/IM: CPT

## 2021-12-21 PROCEDURE — 6360000002 HC RX W HCPCS: Performed by: INTERNAL MEDICINE

## 2021-12-21 PROCEDURE — 84154 ASSAY OF PSA FREE: CPT

## 2021-12-21 PROCEDURE — 80053 COMPREHEN METABOLIC PANEL: CPT

## 2021-12-21 RX ADMIN — LEUPROLIDE ACETATE 22.5 MG: KIT SUBCUTANEOUS at 14:39

## 2021-12-23 DIAGNOSIS — C79.51 SECONDARY MALIGNANT NEOPLASM OF BONE AND BONE MARROW (HCC): Primary | ICD-10-CM

## 2021-12-23 DIAGNOSIS — C79.52 SECONDARY MALIGNANT NEOPLASM OF BONE AND BONE MARROW (HCC): Primary | ICD-10-CM

## 2021-12-23 DIAGNOSIS — C61 PROSTATE CANCER (HCC): ICD-10-CM

## 2021-12-23 RX ORDER — ENZALUTAMIDE 80 MG/1
TABLET ORAL
Qty: 60 TABLET | Refills: 2 | Status: SHIPPED | OUTPATIENT
Start: 2021-12-23 | End: 2022-01-10

## 2021-12-23 RX ORDER — ENZALUTAMIDE 80 MG/1
TABLET ORAL
Qty: 60 TABLET | Refills: 2 | Status: SHIPPED | OUTPATIENT
Start: 2021-12-23 | End: 2022-01-10 | Stop reason: SDUPTHER

## 2022-01-10 DIAGNOSIS — C79.51 SECONDARY MALIGNANT NEOPLASM OF BONE AND BONE MARROW (HCC): ICD-10-CM

## 2022-01-10 DIAGNOSIS — C61 PROSTATE CANCER (HCC): ICD-10-CM

## 2022-01-10 DIAGNOSIS — C79.52 SECONDARY MALIGNANT NEOPLASM OF BONE AND BONE MARROW (HCC): ICD-10-CM

## 2022-01-10 RX ORDER — ENZALUTAMIDE 80 MG/1
TABLET ORAL
Qty: 60 TABLET | Refills: 2 | Status: SHIPPED | OUTPATIENT
Start: 2022-01-10 | End: 2022-01-31 | Stop reason: SDUPTHER

## 2022-01-10 NOTE — PROGRESS NOTES
Lilia Hoyos reordered and sent to below address per request:     [address] [place] [address].    [place], [address]

## 2022-01-31 ENCOUNTER — OFFICE VISIT (OUTPATIENT)
Dept: ONCOLOGY | Age: 72
End: 2022-01-31
Payer: COMMERCIAL

## 2022-01-31 ENCOUNTER — HOSPITAL ENCOUNTER (OUTPATIENT)
Dept: INFUSION THERAPY | Age: 72
Discharge: HOME | End: 2022-01-31

## 2022-01-31 VITALS
TEMPERATURE: 96.3 F | WEIGHT: 225 LBS | HEART RATE: 106 BPM | RESPIRATION RATE: 18 BRPM | BODY MASS INDEX: 28.88 KG/M2 | HEIGHT: 74 IN | DIASTOLIC BLOOD PRESSURE: 62 MMHG | OXYGEN SATURATION: 98 % | SYSTOLIC BLOOD PRESSURE: 117 MMHG

## 2022-01-31 DIAGNOSIS — C61 PROSTATE CANCER (HCC): Primary | ICD-10-CM

## 2022-01-31 DIAGNOSIS — C79.52 SECONDARY MALIGNANT NEOPLASM OF BONE AND BONE MARROW (HCC): ICD-10-CM

## 2022-01-31 DIAGNOSIS — C79.51 SECONDARY MALIGNANT NEOPLASM OF BONE AND BONE MARROW (HCC): ICD-10-CM

## 2022-01-31 PROCEDURE — 99214 OFFICE O/P EST MOD 30 MIN: CPT | Performed by: INTERNAL MEDICINE

## 2022-01-31 RX ORDER — OXYCODONE HYDROCHLORIDE AND ACETAMINOPHEN 5; 325 MG/1; MG/1
1 TABLET ORAL DAILY PRN
Qty: 30 TABLET | Refills: 0 | Status: SHIPPED | OUTPATIENT
Start: 2022-01-31 | End: 2022-03-02

## 2022-01-31 RX ORDER — ENZALUTAMIDE 80 MG/1
160 TABLET ORAL DAILY
Qty: 60 TABLET | Refills: 5 | Status: SHIPPED | OUTPATIENT
Start: 2022-01-31 | End: 2022-02-01 | Stop reason: SDUPTHER

## 2022-01-31 NOTE — PROGRESS NOTES
Patient Name: Dana Landry  Patient : 1950  Patient MRN: [id number]     Primary Oncologist: Ivone Skelton MD  Referring Physician: Concepción Brooks MD, [facility]   Urologist: Dr Calixto Price       Date of Service: 2022      Chief Complaint:   Chief Complaint   Patient presents with    Follow-up        Active Problem list  1. Prostate cancer ([facility] [address]    2. 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 A vazquez was placed and he has been hydrated. Renal and urology are on board. Has some right shoulder pain. Lost 20 lbs in the past few months. ONC history  Hx of prostate cancer s/p XRT  Treated at [facility]  October 10, 2017 showed multiple pleural-based lung nodules of bilateral lung bases with the largest measuring 1.2 cm of posterior right lung base new since 2014. 2017 admission for hyperkalemia acute renal failure, CT head negative for intracranial abnormality. CT abdomen pelvis showed multiple lung base pulmonary nodules. Blastic lesions within the visualized thoracic and lumbar spine. Findings concerning for metastatic disease. Progressive bilateral hydroureteronephrosis without evidence for obstructing calculus  November 10, 2017 bone scan showed multifocal axial and appendicular skeletal disease. Casodex was started.  PSA was 112.3  2017 Bone biopsy showed metastatic adenocarcinoma the prostate Based on L5 bone biopsy   saw DR Lorrie Huddleston)   lupron started  dec 2017 s/p EGD essentially normal  dec 13,2017 started cycle #1 docetaxel, received 3C until  Admitted for hyperkalemia, underwent a temporary hemodialysis, PSA was 3  Then placed on Zytiga until 2018  feb 2018 CT CAP showed Osteoblastic skeletal metastatic disease. Tiny pulmonary nodules are almost certainly postinflammatory. No definite  findings of intrathoracic or intra abdominopelvic metastatic disease and he also developed some neuropathy in his left 5th digit, PSA < 2   june 2018 = 0.1  August 2018 he started taking Xtandi  august 2018 = 0.1  december 2018 PSA 0.04   3-19 pSA 0.01  6-19 PSA < 0.1   9-19 PSA < 0.1  12/27/2019: PSA <0.1    9/30/20: MRI Hip:  Marrow replacing osseous lesions seen in the bilateral superior acetabulum    and left iliac bone suspicous for osseous metastases.  Lesions in the left    superior acetabulum and left iliac bone are included in the field of view on    the post-contrast sequences and demonstrate heterogeneous enhancement.  There    is an additional peripherally sclerotic lesion in the left subtrochanteric    proximal femur without definite internal enhancement but demonstrates    peripheral enhancement.  These findings could be further assessed with a    nuclear medicine bone scan.         Severe left hip joint osteoarthrosis with bony remodeling of the femoral    head.  There is a large left hip joint effusion with a lobular T2    hyperintense lesion at the posterosuperior margin of the left hip joint. This appears chronic in nature and could represent synovial    proliferation/synovitis.  This would have an atypical appearance for    metastatic disease.         Generalized edema in the left adductor, obturator, and gluteus minimus    musculature.         Fluid bright signal in the region of the quadratus femoris muscle could    represent ischiofemoral bursitis. 1/21/21:NBS  Activity is demonstrated at left greater than right acetabulum, compatible   with that marrow signal abnormality on recent MR, and can reflect osseous   metastatic disease given suspicion for such on MR.  Additional considerations   which can have this bone scan appearance include arthropathy or osteomyelitis.       Asymmetric focal activity within the proximal left tibial diaphysis, which   can be due to healing trauma or metastatic disease.  If patient is focally   symptomatic, radiograph could be considered.       Multifocal degenerative changes are otherwise favored as outlined above. 1/24/21: PSA <0.1    9/29/21: CT chest:  1. Left upper lobe 4 mm ground-glass density nodule. 2. Decreased sclerosis involving the presumed prostate metastases in the   thoracic spine when compared to 2018.  No new lytic or blastic osseous lesion. 3. Moderate coronary atherosclerosis       LUNG RADS:   Per ACR Lung-RADS Version 1.1       Category 2, Benign appearance or behavior.       Management:  Continue annual lung screening with LDCT in 12 months.     9/29/21: Dexa scan normal.     PMH: CAD, HTN, HLP, DM, CKD    PSH: upper and lower endoscopy    SH: Lives alone. Three boys. Quit smoking long time ago but started smoking 1PP two and half days. Prior to quitting in 2014 he smoked 1-2 ppd for 40 years. Also h/O ETOH abuse. No other illicit drug abuse    FH: Whole family , his father had 8 sibling and all had cancer, does know what cancer. Has a brother and sister but no cancer diagnosis. Interval History  1/31/2022:Arrived alone to the clinic. Reported that he has been in a lot of stress secondary to his brothers ill health and death. Overall feels the same. . No new bone pains. Has been taking Xtandi. No chest pain, increased sob, palpitations or any dizziness. No abdominal pain, nausea, emesis,diarrhea or any constipation. No  sx. No LE edema.      Review of Systems   Per interval history; otherwise 10 point ROS is negative              Vital Signs:  /62 (Site: Right Upper Arm, Position: Sitting, Cuff Size: Medium Adult)   Pulse 106   Temp 96.3 °F (35.7 °C) (Infrared)   Resp 18   Ht 6' 2\" (1.88 m)   Wt 225 lb (102.1 kg)   SpO2 98%   BMI 28.89 kg/m²     Physical Exam:    CONSTITUTIONAL: awake, alert, tired appearing  EYES: No palor or any icterus  ENT: ATNC  NECK: No JVD  HEMATOLOGIC/LYMPHATIC: no cervical, supraclavicular or axillary lymphadenopathy   LUNGS: CTAB  CARDIOVASCULAR: s1s2 rrr  ABDOMEN: normal bowel sounds x 4, soft, non-distended, non-tender, no masses palpated, no hepatosplenomegaly   MUSCULOSKELETAL: left knee swelling  NEUROLOGIC: GI  SKIN: No rash      Labs:    Hematology:  Lab Results   Component Value Date    WBC 10.7 (H) 12/21/2021    RBC 3.63 (L) 12/21/2021    HGB 11.3 (L) 12/21/2021    HCT 35.8 (L) 12/21/2021    MCV 98.6 12/21/2021    MCH 31.1 (H) 12/21/2021    MCHC 31.6 (L) 12/21/2021    RDW 13.2 12/21/2021     12/21/2021    MPV 8.6 12/21/2021    BANDSPCT 4 (L) 10/08/2017    SEGSPCT 69.1 (H) 12/21/2021    EOSRELPCT 2.4 12/21/2021    BASOPCT 0.2 12/21/2021    LYMPHOPCT 21.8 (L) 12/21/2021    MONOPCT 6.5 (H) 12/21/2021    BANDABS 0.64 10/08/2017    SEGSABS 7.4 12/21/2021    EOSABS 0.3 12/21/2021    BASOSABS 0.0 12/21/2021    LYMPHSABS 2.3 12/21/2021    MONOSABS 0.7 12/21/2021    DIFFTYPE AUTOMATED DIFFERENTIAL 12/21/2021    ANISOCYTOSIS 1+ 10/08/2017    POLYCHROM 1+ 10/08/2017     No results found for: ESR    Chemistry:  Lab Results   Component Value Date     12/21/2021    K 4.4 12/21/2021     12/21/2021    CO2 26 12/21/2021    BUN 38 (H) 12/21/2021    CREATININE 2.2 (H) 12/21/2021    GLUCOSE 106 (H) 12/21/2021    CALCIUM 9.0 12/21/2021    PROT 6.8 12/21/2021    LABALBU 4.3 12/21/2021    BILITOT 0.4 12/21/2021    ALKPHOS 122 12/21/2021    AST 12 (L) 12/21/2021    ALT 7 (L) 12/21/2021    LABGLOM 30 (L) 12/21/2021    GFRAA 36 (L) 12/21/2021    AGRATIO 1.2 01/07/2019    GLOB 3.4 01/07/2019    PHOS 3.3 10/20/2021    MG 2.00 10/20/2021    POCGLU 109 (H) 01/15/2018     Lab Results   Component Value Date     06/28/2021     No components found for: LD  Lab Results   Component Value Date    TSHHS 1.980 01/14/2018       Immunology:  Lab Results   Component Value Date    PROT 6.8 12/21/2021    SPEP  06/28/2021     INTERPRETATION - Within normal limits. Karime Harp MD    ALBUMINELP 3.5 06/28/2021    LABALPH 0.3 06/28/2021    LABALPH 0.8 06/28/2021    LABBETA 1.0 06/28/2021    GAMGLOB 1.2 06/28/2021     No results found for: Cecilia Ascencion, KLFLCR  No results found for: B2M    Coagulation Panel:  Lab Results   Component Value Date    PROTIME 12.9 (H) 02/20/2018    INR 1.13 02/20/2018    APTT 49.7 (H) 11/13/2017       Anemia Panel:  Lab Results   Component Value Date    Dionna Pean 815.1 09/08/2021    FOLATE 4.2 09/08/2021       Tumor Markers:  Lab Results   Component Value Date    PSA <0.1 12/21/2021         Imaging: Reviewed     Pathology:Reviewed     Observations:  Performance Status: ECOG 1  Depression Status: No data recorded          Assessment & Plan:  69 yo male admitted for ARF and hyperkalemia found to Bilateral hydroureteronephrosis and pulmonary nodules and blastic lesions within the thoracic and lumbar spine With metastatic prostate cancer Found on biopsy of L5 with .3   -CT CAP feb 2018 shows osseous disease, MRI left hip October 2020 with possible osseous disease and left hip effusion.   -was on zytiga jan 18 till august 2018  -Switched to [place] in august 2018  -NBS Jan 2021 with possible mets vs arthritis. -dec 2021  PSA <0.1.   -cont enzalumatide  +Tamsulosine. Continue GnRH analogue q 3M  -Recheck psa June 2022     Anemia/elevated ferritin: NC. Hb stable at 11 in dec 2021. Most probably contributed by AOKD, underlying bone mets. No evidence of monoclonal gammopathy. Note ferritin elevated, may have underlying MDS with RS. No liver disease. Defers further evaluation including BMB/aspiration.      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 CKD stage III  -recs per DR. Ambrosio     HTN, well controlled  -PCP following    EUGENE nodule: Recommend repeat Ct chest in sep 2022     Continue other medical care    Discussed the above findings and plan with him and he verbalized understanding    Recommend follow up with PCP and other specialists    Discussed healthy LS, helathy diet and recommend being uptodate with age appropriate screening tool. RTC  June 2022 or earlier if new sx    I have recommended that the patient follow CDC guidelines for prevention of COVID-19 infection. Receiving. COVID vaccine 9/14/21.     [address]

## 2022-01-31 NOTE — PROGRESS NOTES
MA Rooming Questions  Patient: Fannie Watkins  MRN: [id number]    Date: 1/31/2022        1. Do you have any new issues?   no         2. Do you need any refills on medications? yes - Percocet    3. Have you had any imaging done since your last visit?   no    4. Have you been hospitalized or seen in the emergency room since your last visit here?   no    5. Did the patient have a depression screening completed today?  No    No data recorded     PHQ-9 Given to (if applicable):               PHQ-9 Score (if applicable):                     [] Positive     []  Negative              Does question #9 need addressed (if applicable)                     [] Yes    []  No               Elisa Mcgovern MA

## 2022-02-01 DIAGNOSIS — C61 PROSTATE CANCER (HCC): ICD-10-CM

## 2022-02-01 DIAGNOSIS — C79.52 SECONDARY MALIGNANT NEOPLASM OF BONE AND BONE MARROW (HCC): ICD-10-CM

## 2022-02-01 DIAGNOSIS — C79.51 SECONDARY MALIGNANT NEOPLASM OF BONE AND BONE MARROW (HCC): ICD-10-CM

## 2022-02-01 RX ORDER — ENZALUTAMIDE 80 MG/1
160 TABLET ORAL DAILY
Qty: 60 TABLET | Refills: 5 | Status: CANCELLED | OUTPATIENT
Start: 2022-02-01 | End: 2022-03-03

## 2022-02-01 RX ORDER — ENZALUTAMIDE 80 MG/1
160 TABLET ORAL DAILY
Qty: 60 TABLET | Refills: 5 | Status: SHIPPED | OUTPATIENT
Start: 2022-02-01 | End: 2022-03-03

## 2022-02-01 NOTE — TELEPHONE ENCOUNTER
Pt LM stating Eulogio sent in [facility] for him to [place] yesterday and he needs it to go to [facility] instead. Medication order pended.

## 2022-02-07 DIAGNOSIS — C79.52 SECONDARY MALIGNANT NEOPLASM OF BONE AND BONE MARROW (HCC): ICD-10-CM

## 2022-02-07 DIAGNOSIS — C79.51 SECONDARY MALIGNANT NEOPLASM OF BONE AND BONE MARROW (HCC): ICD-10-CM

## 2022-02-07 RX ORDER — OXYCODONE AND ACETAMINOPHEN 7.5; 325 MG/1; MG/1
1 TABLET ORAL 2 TIMES DAILY
Qty: 60 TABLET | Refills: 0 | Status: SHIPPED | OUTPATIENT
Start: 2022-02-07 | End: 2022-03-09

## 2022-02-07 NOTE — TELEPHONE ENCOUNTER
Oxycodone-acetaminophen 7.5-325 mg frequency increased from daily to BID per Dr. Dai Herrera and sent to [facility] AND [facility].  30 day supply QTY: 60.

## 2022-02-21 RX ORDER — VARENICLINE TARTRATE 1 MG/1
1 TABLET, FILM COATED ORAL DAILY
Qty: 42 TABLET | Refills: 0 | Status: SHIPPED | OUTPATIENT
Start: 2022-02-21 | End: 2022-09-09

## 2022-02-21 RX ORDER — VARENICLINE TARTRATE
KIT
Qty: 1 BOX | Refills: 0 | Status: SHIPPED | OUTPATIENT
Start: 2022-02-21 | End: 2022-09-09

## 2022-02-21 RX ORDER — VARENICLINE TARTRATE 0.5 MG/1
0.5 TABLET, FILM COATED ORAL SEE ADMIN INSTRUCTIONS
Qty: 11 TABLET | Refills: 0 | Status: SHIPPED | OUTPATIENT
Start: 2022-02-21 | End: 2022-09-09

## 2022-02-22 ENCOUNTER — CLINICAL DOCUMENTATION (OUTPATIENT)
Dept: ONCOLOGY | Age: 72
End: 2022-02-22

## 2022-03-10 DIAGNOSIS — C61 PROSTATE CANCER (HCC): ICD-10-CM

## 2022-03-10 DIAGNOSIS — Z79.4 TYPE 2 DIABETES MELLITUS WITHOUT COMPLICATION, WITH LONG-TERM CURRENT USE OF INSULIN (HCC): Chronic | ICD-10-CM

## 2022-03-10 DIAGNOSIS — I10 ESSENTIAL HYPERTENSION, BENIGN: Chronic | ICD-10-CM

## 2022-03-10 DIAGNOSIS — N18.32 STAGE 3B CHRONIC KIDNEY DISEASE (HCC): ICD-10-CM

## 2022-03-10 DIAGNOSIS — E11.9 TYPE 2 DIABETES MELLITUS WITHOUT COMPLICATION, WITH LONG-TERM CURRENT USE OF INSULIN (HCC): Chronic | ICD-10-CM

## 2022-03-10 DIAGNOSIS — E87.5 HYPERKALEMIA: ICD-10-CM

## 2022-03-10 LAB
AMORPHOUS: ABNORMAL /HPF
BACTERIA: ABNORMAL /HPF
BILIRUBIN URINE: NEGATIVE
BLOOD, URINE: NEGATIVE
CLARITY: CLEAR
COLOR: YELLOW
EPITHELIAL CELLS, UA: 3 /HPF (ref 0–5)
GLUCOSE URINE: NEGATIVE MG/DL
HYALINE CASTS: 2 /LPF (ref 0–8)
KETONES, URINE: NEGATIVE MG/DL
LEUKOCYTE ESTERASE, URINE: ABNORMAL
MICROSCOPIC EXAMINATION: YES
NITRITE, URINE: POSITIVE
PH UA: 7 (ref 5–8)
PROTEIN UA: 30 MG/DL
RBC UA: 2 /HPF (ref 0–4)
SPECIFIC GRAVITY UA: 1.01 (ref 1–1.03)
URINE TYPE: ABNORMAL
UROBILINOGEN, URINE: 0.2 E.U./DL
WBC UA: 17 /HPF (ref 0–5)

## 2022-03-11 LAB
ALBUMIN SERPL-MCNC: 4.2 G/DL (ref 3.4–5)
ANION GAP SERPL CALCULATED.3IONS-SCNC: 15 MMOL/L (ref 3–16)
BUN BLDV-MCNC: 22 MG/DL (ref 7–20)
CALCIUM SERPL-MCNC: 8.8 MG/DL (ref 8.3–10.6)
CHLORIDE BLD-SCNC: 106 MMOL/L (ref 99–110)
CO2: 22 MMOL/L (ref 21–32)
CREAT SERPL-MCNC: 1.7 MG/DL (ref 0.8–1.3)
CREATININE URINE: 77.5 MG/DL (ref 39–259)
GFR AFRICAN AMERICAN: 48
GFR NON-AFRICAN AMERICAN: 40
GLUCOSE BLD-MCNC: 103 MG/DL (ref 70–99)
MAGNESIUM: 2.2 MG/DL (ref 1.8–2.4)
PHOSPHORUS: 3 MG/DL (ref 2.5–4.9)
POTASSIUM SERPL-SCNC: 4.3 MMOL/L (ref 3.5–5.1)
PROTEIN PROTEIN: 33 MG/DL
PROTEIN/CREAT RATIO: 0.4 MG/DL
SODIUM BLD-SCNC: 143 MMOL/L (ref 136–145)

## 2022-03-15 ENCOUNTER — HOSPITAL ENCOUNTER (OUTPATIENT)
Dept: INFUSION THERAPY | Age: 72
Discharge: HOME | End: 2022-03-15
Payer: COMMERCIAL

## 2022-03-15 DIAGNOSIS — C79.52 SECONDARY MALIGNANT NEOPLASM OF BONE AND BONE MARROW (HCC): Primary | ICD-10-CM

## 2022-03-15 DIAGNOSIS — C79.51 SECONDARY MALIGNANT NEOPLASM OF BONE AND BONE MARROW (HCC): Primary | ICD-10-CM

## 2022-03-15 DIAGNOSIS — C61 PROSTATE CANCER (HCC): ICD-10-CM

## 2022-03-15 PROCEDURE — 96402 CHEMO HORMON ANTINEOPL SQ/IM: CPT

## 2022-03-15 PROCEDURE — 6360000002 HC RX W HCPCS: Performed by: INTERNAL MEDICINE

## 2022-03-15 RX ADMIN — LEUPROLIDE ACETATE 22.5 MG: KIT SUBCUTANEOUS at 14:34

## 2022-06-07 ENCOUNTER — TELEPHONE (OUTPATIENT)
Dept: INFUSION THERAPY | Age: 72
End: 2022-06-07

## 2022-06-07 NOTE — TELEPHONE ENCOUNTER
Pt LVM this morning @ 8:51 cancelling his injection as he is having issues with diarrhea. Returned call @ 3:35 to reschedule appt; offered to link his injection appt to his ov on 6/14 & pt became upset stating provider would have nothing to go over with him as today should have been a lab appt also. This MA explained he was only scheduled for an injection & pt became very upset stating this has happened in the past & he's very frustrated. This MA tried to explain the injection appt has been scheduled since December as it's a 3 mth injection & I scheduled several out (which does not require a lab at the time of injection). Injection appt rescheduled for 6/8 @ 2:30 & I added a lab appt to this as pt insisted he needs a lab drawn also. This MA will check for the lab order. Pt voiced understanding of date + time.

## 2022-06-08 ENCOUNTER — HOSPITAL ENCOUNTER (OUTPATIENT)
Dept: INFUSION THERAPY | Age: 72
Discharge: HOME | End: 2022-06-08
Payer: COMMERCIAL

## 2022-06-08 DIAGNOSIS — D64.9 ANEMIA, UNSPECIFIED TYPE: ICD-10-CM

## 2022-06-08 DIAGNOSIS — C61 PROSTATE CANCER (HCC): Primary | ICD-10-CM

## 2022-06-08 LAB
ALBUMIN SERPL-MCNC: 4.1 GM/DL (ref 3.4–5)
ALP BLD-CCNC: 131 IU/L (ref 40–129)
ALT SERPL-CCNC: 7 U/L (ref 10–40)
ANION GAP SERPL CALCULATED.3IONS-SCNC: 12 MMOL/L (ref 4–16)
AST SERPL-CCNC: 13 IU/L (ref 15–37)
BASOPHILS ABSOLUTE: 0 K/CU MM
BASOPHILS RELATIVE PERCENT: 0.2 % (ref 0–1)
BILIRUB SERPL-MCNC: 0.4 MG/DL (ref 0–1)
BUN BLDV-MCNC: 42 MG/DL (ref 6–23)
CALCIUM SERPL-MCNC: 8.7 MG/DL (ref 8.3–10.6)
CHLORIDE BLD-SCNC: 108 MMOL/L (ref 99–110)
CO2: 23 MMOL/L (ref 21–32)
CREAT SERPL-MCNC: 2.6 MG/DL (ref 0.9–1.3)
DIFFERENTIAL TYPE: ABNORMAL
EOSINOPHILS ABSOLUTE: 0.2 K/CU MM
EOSINOPHILS RELATIVE PERCENT: 2.8 % (ref 0–3)
FERRITIN: 661 NG/ML (ref 30–400)
GFR AFRICAN AMERICAN: 30 ML/MIN/1.73M2
GFR NON-AFRICAN AMERICAN: 24 ML/MIN/1.73M2
GLUCOSE BLD-MCNC: 103 MG/DL (ref 70–99)
HCT VFR BLD CALC: 35 % (ref 42–52)
HEMOGLOBIN: 10.8 GM/DL (ref 13.5–18)
IRON: 68 UG/DL (ref 59–158)
LYMPHOCYTES ABSOLUTE: 1.9 K/CU MM
LYMPHOCYTES RELATIVE PERCENT: 21.8 % (ref 24–44)
MCH RBC QN AUTO: 31.3 PG (ref 27–31)
MCHC RBC AUTO-ENTMCNC: 30.9 % (ref 32–36)
MCV RBC AUTO: 101.4 FL (ref 78–100)
MONOCYTES ABSOLUTE: 0.5 K/CU MM
MONOCYTES RELATIVE PERCENT: 6.3 % (ref 0–4)
PCT TRANSFERRIN: 20 % (ref 10–44)
PDW BLD-RTO: 12.7 % (ref 11.7–14.9)
PLATELET # BLD: 238 K/CU MM (ref 140–440)
PMV BLD AUTO: 9.6 FL (ref 7.5–11.1)
POTASSIUM SERPL-SCNC: 4.7 MMOL/L (ref 3.5–5.1)
RBC # BLD: 3.45 M/CU MM (ref 4.6–6.2)
SEGMENTED NEUTROPHILS ABSOLUTE COUNT: 5.9 K/CU MM
SEGMENTED NEUTROPHILS RELATIVE PERCENT: 68.9 % (ref 36–66)
SODIUM BLD-SCNC: 143 MMOL/L (ref 135–145)
TOTAL IRON BINDING CAPACITY: 335 UG/DL (ref 250–450)
TOTAL PROTEIN: 6.9 GM/DL (ref 6.4–8.2)
UNSATURATED IRON BINDING CAPACITY: 267 UG/DL (ref 110–370)
WBC # BLD: 8.6 K/CU MM (ref 4–10.5)

## 2022-06-08 PROCEDURE — 84153 ASSAY OF PSA TOTAL: CPT

## 2022-06-08 PROCEDURE — 80053 COMPREHEN METABOLIC PANEL: CPT

## 2022-06-08 PROCEDURE — 85025 COMPLETE CBC W/AUTO DIFF WBC: CPT

## 2022-06-08 PROCEDURE — 83540 ASSAY OF IRON: CPT

## 2022-06-08 PROCEDURE — 96402 CHEMO HORMON ANTINEOPL SQ/IM: CPT

## 2022-06-08 PROCEDURE — 84154 ASSAY OF PSA FREE: CPT

## 2022-06-08 PROCEDURE — 83550 IRON BINDING TEST: CPT

## 2022-06-08 PROCEDURE — 82728 ASSAY OF FERRITIN: CPT

## 2022-06-08 PROCEDURE — 36415 COLL VENOUS BLD VENIPUNCTURE: CPT

## 2022-06-08 PROCEDURE — 6360000002 HC RX W HCPCS: Performed by: INTERNAL MEDICINE

## 2022-06-08 RX ADMIN — LEUPROLIDE ACETATE 22.5 MG: KIT SUBCUTANEOUS at 15:14

## 2022-06-08 NOTE — PROGRESS NOTES
Patient ambulated to infusion area, here today for a leuprolide injection. No concerns at this time. Treatment approved and given SQ in RLQ. Patient tolerated well. Patient provided discharge instructions. Patient RTC 06/14 for an OV with Dr. Lopez Cotter.

## 2022-06-09 ENCOUNTER — CLINICAL DOCUMENTATION (OUTPATIENT)
Dept: ONCOLOGY | Age: 72
End: 2022-06-09

## 2022-06-09 NOTE — PROGRESS NOTES
Lab results faxed to nephrology ( Dr. Jane Casas office) @ [phone] per Dr. Gregory Eubanks request.

## 2022-06-14 ENCOUNTER — OFFICE VISIT (OUTPATIENT)
Dept: ONCOLOGY | Age: 72
End: 2022-06-14
Payer: COMMERCIAL

## 2022-06-14 ENCOUNTER — HOSPITAL ENCOUNTER (OUTPATIENT)
Dept: INFUSION THERAPY | Age: 72
Discharge: HOME | End: 2022-06-14

## 2022-06-14 VITALS
TEMPERATURE: 97.1 F | WEIGHT: 231.4 LBS | DIASTOLIC BLOOD PRESSURE: 60 MMHG | HEIGHT: 74 IN | HEART RATE: 107 BPM | OXYGEN SATURATION: 98 % | RESPIRATION RATE: 16 BRPM | SYSTOLIC BLOOD PRESSURE: 114 MMHG | BODY MASS INDEX: 29.7 KG/M2

## 2022-06-14 DIAGNOSIS — M84.50XA PATHOLOGICAL FRACTURE DUE TO METASTATIC BONE DISEASE: ICD-10-CM

## 2022-06-14 DIAGNOSIS — C61 PROSTATE CANCER (HCC): Primary | ICD-10-CM

## 2022-06-14 DIAGNOSIS — C79.51 SECONDARY MALIGNANT NEOPLASM OF BONE AND BONE MARROW (HCC): ICD-10-CM

## 2022-06-14 DIAGNOSIS — Z12.2 ENCOUNTER FOR SCREENING FOR MALIGNANT NEOPLASM OF LUNG: ICD-10-CM

## 2022-06-14 DIAGNOSIS — C79.52 SECONDARY MALIGNANT NEOPLASM OF BONE AND BONE MARROW (HCC): ICD-10-CM

## 2022-06-14 DIAGNOSIS — T38.7X5A OSTEOPOROSIS DUE TO ANDROGEN THERAPY: ICD-10-CM

## 2022-06-14 DIAGNOSIS — M81.8 OSTEOPOROSIS DUE TO ANDROGEN THERAPY: ICD-10-CM

## 2022-06-14 DIAGNOSIS — D64.9 ANEMIA, UNSPECIFIED TYPE: ICD-10-CM

## 2022-06-14 PROCEDURE — 99214 OFFICE O/P EST MOD 30 MIN: CPT | Performed by: INTERNAL MEDICINE

## 2022-06-14 PROCEDURE — 1123F ACP DISCUSS/DSCN MKR DOCD: CPT | Performed by: INTERNAL MEDICINE

## 2022-06-14 RX ORDER — OXYCODONE AND ACETAMINOPHEN 7.5; 325 MG/1; MG/1
1 TABLET ORAL PRN
Status: CANCELLED | OUTPATIENT
Start: 2022-06-14

## 2022-06-14 RX ORDER — OXYCODONE AND ACETAMINOPHEN 7.5; 325 MG/1; MG/1
1 TABLET ORAL 2 TIMES DAILY
Qty: 60 TABLET | Refills: 0 | Status: SHIPPED | OUTPATIENT
Start: 2022-06-14 | End: 2022-07-14

## 2022-06-14 ASSESSMENT — PATIENT HEALTH QUESTIONNAIRE - PHQ9
SUM OF ALL RESPONSES TO PHQ QUESTIONS 1-9: 0
SUM OF ALL RESPONSES TO PHQ9 QUESTIONS 1 & 2: 0
SUM OF ALL RESPONSES TO PHQ QUESTIONS 1-9: 0
SUM OF ALL RESPONSES TO PHQ QUESTIONS 1-9: 0
1. LITTLE INTEREST OR PLEASURE IN DOING THINGS: 0
2. FEELING DOWN, DEPRESSED OR HOPELESS: 0
SUM OF ALL RESPONSES TO PHQ QUESTIONS 1-9: 0

## 2022-06-14 NOTE — PROGRESS NOTES
Patient Name: Jorge To  Patient : 1950  Patient MRN: [id number]     Primary Oncologist: Josue Velázquez MD  Referring Physician: Diamond Perdomo MD, Maribel Allen   Urologist: Dr Montana Berkowitz       Date of Service: 2022      Chief Complaint:   Chief Complaint   Patient presents with    Discuss Labs        Active Problem list  1. Prostate cancer ([address]    2. Anemia, unspecified type    3. 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 Encounter for screening for malignant neoplasm of lung    6. Pathological fracture due to metastatic bone disease           HPI:        Elderly  male admitted for abnormal labs found to be in a acute renal failure with hyperkalemia 2017 With a creatinine of 7.5 CT AP shows evidence of metastatic disease in the abdomen and lungs. A vazquez was placed and he has been hydrated. Renal and urology are on board. Has some right shoulder pain. Lost 20 lbs in the past few months. ONC history  Hx of prostate cancer s/p XRT  Treated at [facility]  October 10, 2017 showed multiple pleural-based lung nodules of bilateral lung bases with the largest measuring 1.2 cm of posterior right lung base new since 2014. 2017 admission for hyperkalemia acute renal failure, CT head negative for intracranial abnormality. CT abdomen pelvis showed multiple lung base pulmonary nodules. Blastic lesions within the visualized thoracic and lumbar spine. Findings concerning for metastatic disease. Progressive bilateral hydroureteronephrosis without evidence for obstructing calculus  November 10, 2017 bone scan showed multifocal axial and appendicular skeletal disease. Casodex was started.  PSA was 112.3  2017 Bone biopsy showed metastatic adenocarcinoma the prostate Based on L5 bone biopsy   saw DR Emiliano Alves)   lupron started  dec 2017 s/p EGD essentially normal  dec 73,0005 started cycle #1 docetaxel, received 3C until jan 2018 January 2018 Admitted for hyperkalemia, underwent a temporary hemodialysis, PSA was 3  Then placed on Zytiga until August 2018 feb 2018 CT CAP showed Osteoblastic skeletal metastatic disease. Tiny pulmonary nodules are almost certainly postinflammatory. No definite  findings of intrathoracic or intra abdominopelvic metastatic disease and he also developed some neuropathy in his left 5th digit, PSA < 2   june 2018 = 0.1  August 2018 he started taking Xtandi  august 2018 = 0.1  december 2018 PSA 0.04   3-19 pSA 0.01  6-19 PSA < 0.1   9-19 PSA < 0.1  12/27/2019: PSA <0.1    9/30/20: MRI Hip:  Marrow replacing osseous lesions seen in the bilateral superior acetabulum    and left iliac bone suspicous for osseous metastases.  Lesions in the left    superior acetabulum and left iliac bone are included in the field of view on    the post-contrast sequences and demonstrate heterogeneous enhancement.  There    is an additional peripherally sclerotic lesion in the left subtrochanteric    proximal femur without definite internal enhancement but demonstrates    peripheral enhancement.  These findings could be further assessed with a    nuclear medicine bone scan.         Severe left hip joint osteoarthrosis with bony remodeling of the femoral    head.  There is a large left hip joint effusion with a lobular T2    hyperintense lesion at the posterosuperior margin of the left hip joint. This appears chronic in nature and could represent synovial    proliferation/synovitis.  This would have an atypical appearance for    metastatic disease.         Generalized edema in the left adductor, obturator, and gluteus minimus    musculature.         Fluid bright signal in the region of the quadratus femoris muscle could    represent ischiofemoral bursitis.       1/21/21:NBS  Activity is demonstrated at left greater than right acetabulum, compatible   with that marrow signal abnormality on recent MR, and can reflect osseous   metastatic disease given suspicion for such on MR. Additional considerations   which can have this bone scan appearance include arthropathy or osteomyelitis.       Asymmetric focal activity within the proximal left tibial diaphysis, which   can be due to healing trauma or metastatic disease.  If patient is focally   symptomatic, radiograph could be considered.       Multifocal degenerative changes are otherwise favored as outlined above. 1/24/21: PSA <0.1    9/29/21: CT chest:  1. Left upper lobe 4 mm ground-glass density nodule. 2. Decreased sclerosis involving the presumed prostate metastases in the   thoracic spine when compared to 2018.  No new lytic or blastic osseous lesion. 3. Moderate coronary atherosclerosis       LUNG RADS:   Per ACR Lung-RADS Version 1.1       Category 2, Benign appearance or behavior.       Management:  Continue annual lung screening with LDCT in 12 months.     9/29/21: Dexa scan normal.     PMH: CAD, HTN, HLP, DM, CKD    PSH: upper and lower endoscopy    SH: Lives alone. Three boys. Quit smoking long time ago but started smoking 1PP two and half days. Prior to quitting in 2014 he smoked 1-2 ppd for 40 years. Also h/O ETOH abuse. No other illicit drug abuse    FH: Whole family , his father had 8 sibling and all had cancer, does know what cancer. Has a brother and sister but no cancer diagnosis. Interval History  6/14/2022:Arrived alone to the clinic. Drinks plenty of fluids. Energy levels are the same. Left LE pain is chronic and stable. No abdominal pain, hematuria. No weight loss. No chest pain. No new bone pains.      Review of Systems   Per interval history; otherwise 10 point ROS is negative              Vital Signs:  /60 (Site: Right Upper Arm, Position: Sitting, Cuff Size: Medium Adult)   Pulse (!) 107   Temp 97.1 °F (36.2 °C) (Infrared)   Resp 16   Ht 6' 2\" (1.88 m)   Wt 231 lb 6.4 oz (105 kg)   SpO2 98% BMI 29.71 kg/m²     Physical Exam:    CONSTITUTIONAL: awake, alert, tired appearing  EYES: No palor or any icterus  ENT: ATNC  NECK: No JVD  HEMATOLOGIC/LYMPHATIC: no cervical, supraclavicular or axillary lymphadenopathy   LUNGS: CTAB  CARDIOVASCULAR: s1s2 rrr  ABDOMEN: normal bowel sounds x 4, soft, non-distended, non-tender, no masses palpated, no hepatosplenomegaly   MUSCULOSKELETAL: left knee swelling  NEUROLOGIC: GI  SKIN: No rash      Labs:    Hematology:  Lab Results   Component Value Date    WBC 8.6 06/08/2022    RBC 3.45 (L) 06/08/2022    HGB 10.8 (L) 06/08/2022    HCT 35.0 (L) 06/08/2022    .4 (H) 06/08/2022    MCH 31.3 (H) 06/08/2022    MCHC 30.9 (L) 06/08/2022    RDW 12.7 06/08/2022     06/08/2022    MPV 9.6 06/08/2022    BANDSPCT 4 (L) 10/08/2017    SEGSPCT 68.9 (H) 06/08/2022    EOSRELPCT 2.8 06/08/2022    BASOPCT 0.2 06/08/2022    LYMPHOPCT 21.8 (L) 06/08/2022    MONOPCT 6.3 (H) 06/08/2022    BANDABS 0.64 10/08/2017    SEGSABS 5.9 06/08/2022    EOSABS 0.2 06/08/2022    BASOSABS 0.0 06/08/2022    LYMPHSABS 1.9 06/08/2022    MONOSABS 0.5 06/08/2022    DIFFTYPE AUTOMATED DIFFERENTIAL 06/08/2022    ANISOCYTOSIS 1+ 10/08/2017    POLYCHROM 1+ 10/08/2017     No results found for: ESR    Chemistry:  Lab Results   Component Value Date     06/08/2022    K 4.7 06/08/2022     06/08/2022    CO2 23 06/08/2022    BUN 42 (H) 06/08/2022    CREATININE 2.6 (H) 06/08/2022    GLUCOSE 103 (H) 06/08/2022    CALCIUM 8.7 06/08/2022    PROT 6.9 06/08/2022    LABALBU 4.1 06/08/2022    BILITOT 0.4 06/08/2022    ALKPHOS 131 (H) 06/08/2022    AST 13 (L) 06/08/2022    ALT 7 (L) 06/08/2022    LABGLOM 24 (L) 06/08/2022    GFRAA 30 (L) 06/08/2022    AGRATIO 1.2 01/07/2019    GLOB 3.4 01/07/2019    PHOS 3.0 03/10/2022    MG 2.20 03/10/2022    POCGLU 109 (H) 01/15/2018     Lab Results   Component Value Date     06/28/2021     No components found for: LD  Lab Results   Component Value Date    [facility] 1.980 01/14/2018       Immunology:  Lab Results   Component Value Date    PROT 6.9 06/08/2022    SPEP  06/28/2021     INTERPRETATION - Within normal limits. Hal Mcdonald MD    ALBUMINELP 3.5 06/28/2021    LABALPH 0.3 06/28/2021    LABALPH 0.8 06/28/2021    LABBETA 1.0 06/28/2021    GAMGLOB 1.2 06/28/2021     No results found for: Vero Rosas, VIRGILIOLCR  No results found for: B2M    Coagulation Panel:  Lab Results   Component Value Date    PROTIME 12.9 (H) 02/20/2018    INR 1.13 02/20/2018    APTT 49.7 (H) 11/13/2017       Anemia Panel:  Lab Results   Component Value Date    KOIWOWAT30 815.1 09/08/2021    FOLATE 4.2 09/08/2021       Tumor Markers:  Lab Results   Component Value Date    PSA <0.1 06/08/2022         Imaging: Reviewed     Pathology:Reviewed     Observations:  Performance Status: ECOG 1  Depression Status: PHQ-9 Total Score: 0 (6/14/2022 11:24 AM)          Assessment & Plan:  68 yo male admitted for ARF and hyperkalemia found to Bilateral hydroureteronephrosis and pulmonary nodules and blastic lesions within the thoracic and lumbar spine With metastatic prostate cancer Found on biopsy of L5 with .3   -CT CAP feb 2018 shows osseous disease, MRI left hip October 2020 with possible osseous disease and left hip effusion.   -was on zytiga jan 18 till august 2018  -Switched to [place] in august 2018  -NBS Jan 2021 with possible mets vs arthritis. -June 2022 PSA <0.1  -cont enzalumatide  +Tamsulosine. Continue GnRH analogue q 3M  -Recheck psa in 3 months     Anemia/elevated ferritin: NC. Hb 10.8. Most probably contributed by AOKD, underlying bone mets. No evidence of monoclonal gammopathy. Note ferritin elevated, may have underlying MDS with RS. No liver disease. Defers further evaluation including BMB/aspiration.      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 CKD stage III, notified regarding rise in creatinine. Recommend that he follows with Dr Rashad Rowan. Avoid nephrotoxic medication and adequate fluid intake. HTN, well controlled  -PCP following    EUGENE nodule: Recommend repeat Ct chest     Continue other medical care    Discussed the above findings and plan with him and he verbalized understanding    Recommend follow up with PCP and other specialists    Discussed healthy LS, helathy diet and recommend being uptodate with age appropriate screening tool.      RTC Nov 2022 or earlier if new sx      MARIAM

## 2022-06-14 NOTE — PROGRESS NOTES
MA Rooming Questions  Patient: Nadir Vergara  MRN: [id number]    Date: 6/14/2022        1. Do you have any new issues? yes - Left hip/leg pain         2. Do you need any refills on medications? yes - Percocet    3. Have you had any imaging done since your last visit?   no    4. Have you been hospitalized or seen in the emergency room since your last visit here?   no    5. Did the patient have a depression screening completed today?  Yes    PHQ-9 Total Score: 0 (6/14/2022 11:24 AM)       PHQ-9 Given to (if applicable):               PHQ-9 Score (if applicable):                     [] Positive     []  Negative              Does question #9 need addressed (if applicable)                     [] Yes    []  No               Nazareth Michael, CMA

## 2022-06-21 DIAGNOSIS — N18.4 CHRONIC KIDNEY DISEASE, STAGE IV (SEVERE) (HCC): ICD-10-CM

## 2022-06-21 DIAGNOSIS — E43 SEVERE MALNUTRITION (HCC): ICD-10-CM

## 2022-06-21 DIAGNOSIS — E87.5 HYPERKALEMIA: ICD-10-CM

## 2022-06-21 DIAGNOSIS — E11.9 TYPE 2 DIABETES MELLITUS WITHOUT COMPLICATION, WITH LONG-TERM CURRENT USE OF INSULIN (HCC): ICD-10-CM

## 2022-06-21 DIAGNOSIS — Z79.4 TYPE 2 DIABETES MELLITUS WITHOUT COMPLICATION, WITH LONG-TERM CURRENT USE OF INSULIN (HCC): ICD-10-CM

## 2022-06-21 DIAGNOSIS — I10 ESSENTIAL HYPERTENSION, BENIGN: ICD-10-CM

## 2022-06-21 LAB
ALBUMIN SERPL-MCNC: 4.2 G/DL (ref 3.4–5)
ANION GAP SERPL CALCULATED.3IONS-SCNC: 13 MMOL/L (ref 3–16)
BUN BLDV-MCNC: 35 MG/DL (ref 7–20)
CALCIUM SERPL-MCNC: 8.8 MG/DL (ref 8.3–10.6)
CHLORIDE BLD-SCNC: 108 MMOL/L (ref 99–110)
CO2: 22 MMOL/L (ref 21–32)
CREAT SERPL-MCNC: 2.3 MG/DL (ref 0.8–1.3)
CREATININE URINE: 61.1 MG/DL (ref 39–259)
GFR AFRICAN AMERICAN: 34
GFR NON-AFRICAN AMERICAN: 28
GLUCOSE BLD-MCNC: 101 MG/DL (ref 70–99)
MAGNESIUM: 1.9 MG/DL (ref 1.8–2.4)
PHOSPHORUS: 3.5 MG/DL (ref 2.5–4.9)
POTASSIUM SERPL-SCNC: 4.7 MMOL/L (ref 3.5–5.1)
PROTEIN PROTEIN: 36 MG/DL
PROTEIN/CREAT RATIO: 0.6 MG/DL
SODIUM BLD-SCNC: 143 MMOL/L (ref 136–145)

## 2022-07-14 RX ORDER — ENZALUTAMIDE 80 MG/1
TABLET ORAL
Qty: 60 TABLET | Refills: 4 | Status: SHIPPED | OUTPATIENT
Start: 2022-07-14 | End: 2022-07-25 | Stop reason: SDUPTHER

## 2022-07-14 NOTE — PROGRESS NOTES
[facility] Update    Date: 07/14/22    Patient's prescription benefits are being verified for coverage. Status update to follow as soon as possible. Please call us with any questions at [phone] opt.  2.

## 2022-07-15 NOTE — PROGRESS NOTES
Patient med was last filled 7-1-22 and can be refilled on 7-24-22. VM left for patient to determine where he would like this Rxsent. Please callus with any questions [phone] opt 2.

## 2022-07-20 RX ORDER — ENZALUTAMIDE 80 MG/1
TABLET ORAL
Qty: 60 TABLET | Refills: 4 | OUTPATIENT
Start: 2022-07-20

## 2022-07-25 RX ORDER — ENZALUTAMIDE 80 MG/1
160 TABLET ORAL DAILY
Qty: 60 TABLET | Refills: 5 | Status: ACTIVE | OUTPATIENT
Start: 2022-07-25 | End: 2022-09-26 | Stop reason: SDUPTHER

## 2022-08-17 NOTE — TELEPHONE ENCOUNTER
Can we get a bone scan first Potassium level was 3 4 on August 16, 2022  Follow-up [facility] tomorrow

## 2022-08-29 ENCOUNTER — TELEPHONE (OUTPATIENT)
Dept: INFUSION THERAPY | Age: 72
End: 2022-08-29
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
## 2022-08-31 ENCOUNTER — HOSPITAL ENCOUNTER (OUTPATIENT)
Dept: INFUSION THERAPY | Age: 72
Discharge: HOME | End: 2022-08-31
Payer: COMMERCIAL

## 2022-08-31 DIAGNOSIS — C61 PROSTATE CANCER (HCC): Primary | ICD-10-CM

## 2022-08-31 DIAGNOSIS — C79.52 SECONDARY MALIGNANT NEOPLASM OF BONE AND BONE MARROW (HCC): ICD-10-CM

## 2022-08-31 DIAGNOSIS — M81.8 OSTEOPOROSIS DUE TO ANDROGEN THERAPY: ICD-10-CM

## 2022-08-31 DIAGNOSIS — C79.51 SECONDARY MALIGNANT NEOPLASM OF BONE AND BONE MARROW (HCC): ICD-10-CM

## 2022-08-31 DIAGNOSIS — T38.7X5A OSTEOPOROSIS DUE TO ANDROGEN THERAPY: ICD-10-CM

## 2022-08-31 DIAGNOSIS — M84.50XA PATHOLOGICAL FRACTURE DUE TO METASTATIC BONE DISEASE: ICD-10-CM

## 2022-08-31 DIAGNOSIS — D64.9 ANEMIA, UNSPECIFIED TYPE: ICD-10-CM

## 2022-08-31 DIAGNOSIS — Z12.2 ENCOUNTER FOR SCREENING FOR MALIGNANT NEOPLASM OF LUNG: ICD-10-CM

## 2022-08-31 LAB
ALBUMIN SERPL-MCNC: 4.3 GM/DL (ref 3.4–5)
ALP BLD-CCNC: 136 IU/L (ref 40–129)
ALT SERPL-CCNC: 7 U/L (ref 10–40)
ANION GAP SERPL CALCULATED.3IONS-SCNC: 11 MMOL/L (ref 4–16)
AST SERPL-CCNC: 14 IU/L (ref 15–37)
BASOPHILS ABSOLUTE: 0 K/CU MM
BASOPHILS RELATIVE PERCENT: 0.3 % (ref 0–1)
BILIRUB SERPL-MCNC: 0.3 MG/DL (ref 0–1)
BUN BLDV-MCNC: 35 MG/DL (ref 6–23)
CALCIUM SERPL-MCNC: 8.8 MG/DL (ref 8.3–10.6)
CHLORIDE BLD-SCNC: 106 MMOL/L (ref 99–110)
CO2: 24 MMOL/L (ref 21–32)
CREAT SERPL-MCNC: 2.1 MG/DL (ref 0.9–1.3)
DIFFERENTIAL TYPE: ABNORMAL
EOSINOPHILS ABSOLUTE: 0.3 K/CU MM
EOSINOPHILS RELATIVE PERCENT: 2.9 % (ref 0–3)
ERYTHROCYTE SEDIMENTATION RATE: 81 MM/HR (ref 0–20)
FERRITIN: 602 NG/ML (ref 30–400)
FOLATE: 5.5 NG/ML (ref 3.1–17.5)
GFR AFRICAN AMERICAN: 38 ML/MIN/1.73M2
GFR NON-AFRICAN AMERICAN: 31 ML/MIN/1.73M2
GLUCOSE BLD-MCNC: 112 MG/DL (ref 70–99)
HCT VFR BLD CALC: 35.2 % (ref 42–52)
HEMOGLOBIN: 11.1 GM/DL (ref 13.5–18)
IRON: 76 UG/DL (ref 59–158)
LYMPHOCYTES ABSOLUTE: 1.8 K/CU MM
LYMPHOCYTES RELATIVE PERCENT: 19.6 % (ref 24–44)
MCH RBC QN AUTO: 31.5 PG (ref 27–31)
MCHC RBC AUTO-ENTMCNC: 31.5 % (ref 32–36)
MCV RBC AUTO: 100 FL (ref 78–100)
MONOCYTES ABSOLUTE: 0.6 K/CU MM
MONOCYTES RELATIVE PERCENT: 6.1 % (ref 0–4)
PCT TRANSFERRIN: 22 % (ref 10–44)
PDW BLD-RTO: 12.5 % (ref 11.7–14.9)
PLATELET # BLD: 226 K/CU MM (ref 140–440)
PMV BLD AUTO: 9.2 FL (ref 7.5–11.1)
POTASSIUM SERPL-SCNC: 4.2 MMOL/L (ref 3.5–5.1)
RBC # BLD: 3.52 M/CU MM (ref 4.6–6.2)
RETICULOCYTE COUNT PCT: 2.5 % (ref 0.2–2.2)
SEGMENTED NEUTROPHILS ABSOLUTE COUNT: 6.4 K/CU MM
SEGMENTED NEUTROPHILS RELATIVE PERCENT: 71.1 % (ref 36–66)
SODIUM BLD-SCNC: 141 MMOL/L (ref 135–145)
TOTAL IRON BINDING CAPACITY: 340 UG/DL (ref 250–450)
TOTAL PROTEIN: 7 GM/DL (ref 6.4–8.2)
UNSATURATED IRON BINDING CAPACITY: 264 UG/DL (ref 110–370)
VITAMIN B-12: 707.9 PG/ML (ref 211–911)
WBC # BLD: 9 K/CU MM (ref 4–10.5)

## 2022-08-31 PROCEDURE — 80053 COMPREHEN METABOLIC PANEL: CPT

## 2022-08-31 PROCEDURE — 86320 SERUM IMMUNOELECTROPHORESIS: CPT

## 2022-08-31 PROCEDURE — 84153 ASSAY OF PSA TOTAL: CPT

## 2022-08-31 PROCEDURE — 85025 COMPLETE CBC W/AUTO DIFF WBC: CPT

## 2022-08-31 PROCEDURE — 82607 VITAMIN B-12: CPT

## 2022-08-31 PROCEDURE — 84154 ASSAY OF PSA FREE: CPT

## 2022-08-31 PROCEDURE — 83540 ASSAY OF IRON: CPT

## 2022-08-31 PROCEDURE — 6360000002 HC RX W HCPCS: Performed by: INTERNAL MEDICINE

## 2022-08-31 PROCEDURE — 82728 ASSAY OF FERRITIN: CPT

## 2022-08-31 PROCEDURE — 84155 ASSAY OF PROTEIN SERUM: CPT

## 2022-08-31 PROCEDURE — 84165 PROTEIN E-PHORESIS SERUM: CPT

## 2022-08-31 PROCEDURE — 82746 ASSAY OF FOLIC ACID SERUM: CPT

## 2022-08-31 PROCEDURE — 36415 COLL VENOUS BLD VENIPUNCTURE: CPT

## 2022-08-31 PROCEDURE — 85652 RBC SED RATE AUTOMATED: CPT

## 2022-08-31 PROCEDURE — 83550 IRON BINDING TEST: CPT

## 2022-08-31 PROCEDURE — 85045 AUTOMATED RETICULOCYTE COUNT: CPT

## 2022-08-31 PROCEDURE — 96372 THER/PROPH/DIAG INJ SC/IM: CPT

## 2022-08-31 RX ADMIN — LEUPROLIDE ACETATE 22.5 MG: KIT SUBCUTANEOUS at 15:06

## 2022-08-31 NOTE — PROGRESS NOTES
Pt here for Lupron injection and labs. Pt has no complaints. Injection given SQ to LUQ. Pt tolerated injection with no complaints.  Left  ambulatory discharge  instructions given

## 2022-09-02 LAB
ALBUMIN ELP: 3.4 GM/DL (ref 3.2–5.6)
ALPHA-1-GLOBULIN: 0.3 GM/DL (ref 0.1–0.4)
ALPHA-2-GLOBULIN: 0.8 GM/DL (ref 0.4–1.2)
BETA GLOBULIN: 1.2 GM/DL (ref 0.5–1.3)
GAMMA GLOBULIN: 1.4 GM/DL (ref 0.5–1.6)
SPEP INTERPRETATION: NORMAL
SPEP INTERPRETATION: NORMAL
TOTAL PROTEIN: 7 GM/DL (ref 6.4–8.2)

## 2022-09-09 ENCOUNTER — CLINICAL DOCUMENTATION (OUTPATIENT)
Dept: ONCOLOGY | Age: 72
End: 2022-09-09

## 2022-09-09 ENCOUNTER — OFFICE VISIT (OUTPATIENT)
Dept: ONCOLOGY | Age: 72
End: 2022-09-09
Payer: COMMERCIAL

## 2022-09-09 ENCOUNTER — HOSPITAL ENCOUNTER (OUTPATIENT)
Dept: INFUSION THERAPY | Age: 72
Discharge: HOME | End: 2022-09-09
Payer: COMMERCIAL

## 2022-09-09 VITALS
BODY MASS INDEX: 29.49 KG/M2 | WEIGHT: 229.8 LBS | TEMPERATURE: 96.8 F | DIASTOLIC BLOOD PRESSURE: 60 MMHG | HEIGHT: 74 IN | HEART RATE: 97 BPM | OXYGEN SATURATION: 99 % | SYSTOLIC BLOOD PRESSURE: 118 MMHG

## 2022-09-09 DIAGNOSIS — C79.51 SECONDARY MALIGNANT NEOPLASM OF BONE AND BONE MARROW (HCC): ICD-10-CM

## 2022-09-09 DIAGNOSIS — C61 PROSTATE CANCER (HCC): Primary | ICD-10-CM

## 2022-09-09 DIAGNOSIS — T38.7X5A OSTEOPOROSIS DUE TO ANDROGEN THERAPY: ICD-10-CM

## 2022-09-09 DIAGNOSIS — M81.8 OSTEOPOROSIS DUE TO ANDROGEN THERAPY: ICD-10-CM

## 2022-09-09 DIAGNOSIS — C79.52 SECONDARY MALIGNANT NEOPLASM OF BONE AND BONE MARROW (HCC): ICD-10-CM

## 2022-09-09 DIAGNOSIS — D64.9 ANEMIA, UNSPECIFIED TYPE: ICD-10-CM

## 2022-09-09 PROCEDURE — 1123F ACP DISCUSS/DSCN MKR DOCD: CPT | Performed by: INTERNAL MEDICINE

## 2022-09-09 PROCEDURE — 99211 OFF/OP EST MAY X REQ PHY/QHP: CPT

## 2022-09-09 PROCEDURE — 99214 OFFICE O/P EST MOD 30 MIN: CPT | Performed by: INTERNAL MEDICINE

## 2022-09-09 RX ORDER — VARENICLINE TARTRATE 1 MG/1
1 TABLET, FILM COATED ORAL 2 TIMES DAILY
Qty: 180 TABLET | Refills: 0 | Status: SHIPPED | OUTPATIENT
Start: 2022-09-09

## 2022-09-09 NOTE — PROGRESS NOTES
Patient Name: Yen Carter  Patient : 1950  Patient MRN: [id number]     Primary Oncologist: Garth Perla MD  Referring Physician: Julio West MD, Linda Quiñonez   Urologist: Dr Poonam Clemons       Date of Service: 2022      Chief Complaint:   Chief Complaint   Patient presents with    Follow-up        Active Problem list  1. Prostate cancer ([address]    2. Anemia, unspecified type    3. 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 A vazquez was placed and he has been hydrated. Renal and urology are on board. Has some right shoulder pain. Lost 20 lbs in the past few months. ONC history  Hx of prostate cancer s/p XRT  Treated at [facility]  October 10, 2017 showed multiple pleural-based lung nodules of bilateral lung bases with the largest measuring 1.2 cm of posterior right lung base new since 2014. 2017 admission for hyperkalemia acute renal failure, CT head negative for intracranial abnormality. CT abdomen pelvis showed multiple lung base pulmonary nodules. Blastic lesions within the visualized thoracic and lumbar spine. Findings concerning for metastatic disease. Progressive bilateral hydroureteronephrosis without evidence for obstructing calculus  November 10, 2017 bone scan showed multifocal axial and appendicular skeletal disease. Casodex was started.  PSA was 112.3  2017 Bone biopsy showed metastatic adenocarcinoma the prostate Based on L5 bone biopsy   saw DR Manoj David)   lupron started  dec 2017 s/p EGD essentially normal  dec 13,2017 started cycle #1 docetaxel, received 3C until  Admitted for hyperkalemia, underwent a temporary considerations   which can have this bone scan appearance include arthropathy or osteomyelitis. Asymmetric focal activity within the proximal left tibial diaphysis, which   can be due to healing trauma or metastatic disease. If patient is focally   symptomatic, radiograph could be considered. Multifocal degenerative changes are otherwise favored as outlined above. 1/24/21: PSA <0.1    9/29/21: CT chest:  1. Left upper lobe 4 mm ground-glass density nodule. 2. Decreased sclerosis involving the presumed prostate metastases in the   thoracic spine when compared to 2018. No new lytic or blastic osseous lesion. 3. Moderate coronary atherosclerosis       LUNG RADS:   Per ACR Lung-RADS Version 1.1       Category 2, Benign appearance or behavior. Management:  Continue annual lung screening with LDCT in 12 months.     9/29/21: Dexa scan normal.     PMH: CAD, HTN, HLP, DM, CKD    PSH: upper and lower endoscopy    SH: Lives alone. Three boys. Quit smoking long time ago but started smoking 1PP two and half days. Prior to quitting in 2014 he smoked 1-2 ppd for 40 years. Also h/O ETOH abuse. No other illicit drug abuse    FH: Whole family , his father had 8 sibling and all had cancer, does know what cancer. Has a brother and sister but no cancer diagnosis. Interval History  9/9/2022:Arrived alone to the clinic. Reported that he would like to quit smoking, smokes 1/2 ppd. Reported that he does not feel good, stomach is upset. Reported left hip pain is bothering a lot lately. Current analgesic regimen does not help a lot.  No other new sx otherwise     Review of Systems   Per interval history; otherwise 10 point ROS is negative              Vital Signs:  /60 (Site: Right Upper Arm, Position: Sitting, Cuff Size: Medium Adult)   Pulse 97   Temp 96.8 °F (36 °C) (Infrared)   Ht 6' 2\" (1.88 m)   Wt 229 lb 12.8 oz (104.2 kg)   SpO2 99%   BMI 29.50 kg/m²     Physical Exam:    CONSTITUTIONAL: 1.980 01/14/2018       Immunology:  Lab Results   Component Value Date    PROT 7.0 08/31/2022    PROT 7.0 08/31/2022    SPEP  08/31/2022     INTERPRETATION - Monoclonal gammopathy composed of free lambda light chains, measuring 400 mg/dL. LP.    SPEP  08/31/2022     INTERPRETATION - Monoclonal free lambda with a possible faint IgG heavy chain component. LP.    ALBUMINELP 3.4 08/31/2022    LABALPH 0.3 08/31/2022    LABALPH 0.8 08/31/2022    LABBETA 1.2 08/31/2022    GAMGLOB 1.4 08/31/2022     No results found for: Elverna Apt, KLFLCR  No results found for: B2M    Coagulation Panel:  Lab Results   Component Value Date    PROTIME 12.9 (H) 02/20/2018    INR 1.13 02/20/2018    APTT 49.7 (H) 11/13/2017       Anemia Panel:  Lab Results   Component Value Date    HTIFTCET61 707.9 08/31/2022    FOLATE 5.5 08/31/2022       Tumor Markers:  Lab Results   Component Value Date    PSA <0.1 08/31/2022         Imaging: Reviewed     Pathology:Reviewed     Observations:  Performance Status: ECOG 1  Depression Status: No data recorded          Assessment & Plan:  68 yo male admitted for ARF and hyperkalemia found to Bilateral hydroureteronephrosis and pulmonary nodules and blastic lesions within the thoracic and lumbar spine With metastatic prostate cancer Found on biopsy of L5 with .3   -CT CAP feb 2018 shows osseous disease, MRI left hip October 2020 with possible osseous disease and left hip effusion. -NBS Jan 2021 with possible mets vs arthritis. Referred to radiation oncology, they did not recommend palliative XRT,  as they thought its probably sec to some other etiology. Deferred ortho evaluation.   -was on zytiga jan 18 till august 2018  -Switched to [place] in august 2018  -Sep 2022 PSA <0.1  -cont enzalumatide  +Tamsulosine. Continue GnRH analogue q 3M  -Recheck psa in 3 months     Anemia/elevated ferritin: NC. Hb 11.1Most probably contributed by AOKD, underlying bone mets.  Looks like MGUS, will repeat in 6M.Note ferritin elevated, may have underlying MDS with RS. No liver disease. Defers further evaluation including BMB/aspiration. 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   -defers changing analgesic regimen, adequate bowel regimen. CKD stage III, notified regarding rise in creatinine. Recommend that he follows with Dr Essnece Anaya. Avoid nephrotoxic medication and adequate fluid intake. HTN, well controlled  -PCP following    EUGENE nodule: Recommend repeat Ct chest , he defered repeat now     Continue other medical care    Discussed the above findings and plan with him and he verbalized understanding    Recommend follow up with PCP and other specialists    Discussed healthy LS, helathy diet and recommend being uptodate with age appropriate screening tool.      RTC  dec 2023 or earlier if new sx      MARIAM

## 2022-09-09 NOTE — PROGRESS NOTES
MA Rooming Questions  Patient: Mya Postal  MRN: [id number]    Date: 9/9/2022        1. Do you have any new issues?   no         2. Do you need any refills on medications? yes - Percocet    3. Have you had any imaging done since your last visit?   no    4. Have you been hospitalized or seen in the emergency room since your last visit here?   no    5. Did the patient have a depression screening completed today?  No    No data recorded     PHQ-9 Given to (if applicable):               PHQ-9 Score (if applicable):                     [] Positive     []  Negative              Does question #9 need addressed (if applicable)                     [] Yes    []  No               Carlyn Alvarado CMA

## 2022-09-15 DIAGNOSIS — C61 PROSTATE CANCER (HCC): Primary | ICD-10-CM

## 2022-09-15 DIAGNOSIS — C79.52 SECONDARY MALIGNANT NEOPLASM OF BONE AND BONE MARROW (HCC): ICD-10-CM

## 2022-09-15 DIAGNOSIS — C79.51 SECONDARY MALIGNANT NEOPLASM OF BONE AND BONE MARROW (HCC): ICD-10-CM

## 2022-09-15 RX ORDER — OXYCODONE AND ACETAMINOPHEN 7.5; 325 MG/1; MG/1
1 TABLET ORAL 2 TIMES DAILY PRN
Qty: 60 TABLET | Refills: 0 | Status: SHIPPED | OUTPATIENT
Start: 2022-09-15 | End: 2022-10-15

## 2022-09-26 ENCOUNTER — TELEPHONE (OUTPATIENT)
Dept: ONCOLOGY | Age: 72
End: 2022-09-26

## 2022-09-26 DIAGNOSIS — C61 PROSTATE CANCER (HCC): Primary | ICD-10-CM

## 2022-09-26 RX ORDER — ENZALUTAMIDE 80 MG/1
160 TABLET ORAL DAILY
Qty: 60 TABLET | Refills: 5 | Status: ACTIVE | OUTPATIENT
Start: 2022-09-26

## 2022-09-26 NOTE — TELEPHONE ENCOUNTER
Pt left message stating his steffen has exhausted funds and needs additional assistance. Patient is not eligible for any current funding. Application completed for Ester STRINGER. Will send script to:     [facility], [facility]. [facility]  [address] [facility] [address]  [id number]    Once approved meds will ship from [facility].

## 2022-09-26 NOTE — PROGRESS NOTES
Per Caryn Head: Can you fwd the patients script to the following pharmacy, patient called this morning saying his steffen exhausted and there are no additional funds so an rebecca was sent for PAP.      Thanks    [facility], [facility]. [facility]

## 2022-09-26 NOTE — PROGRESS NOTES
RX for xtandi e-scribed to [facility], [facility]. [facility] per physician order.

## 2022-10-03 DIAGNOSIS — Z79.4 TYPE 2 DIABETES MELLITUS WITHOUT COMPLICATION, WITH LONG-TERM CURRENT USE OF INSULIN (HCC): Chronic | ICD-10-CM

## 2022-10-03 DIAGNOSIS — E87.5 HYPERKALEMIA: ICD-10-CM

## 2022-10-03 DIAGNOSIS — E11.9 TYPE 2 DIABETES MELLITUS WITHOUT COMPLICATION, WITH LONG-TERM CURRENT USE OF INSULIN (HCC): Chronic | ICD-10-CM

## 2022-10-03 DIAGNOSIS — N18.32 STAGE 3B CHRONIC KIDNEY DISEASE (HCC): ICD-10-CM

## 2022-10-03 DIAGNOSIS — I10 ESSENTIAL HYPERTENSION, BENIGN: Chronic | ICD-10-CM

## 2022-10-03 DIAGNOSIS — C61 PROSTATE CANCER (HCC): ICD-10-CM

## 2022-10-03 LAB
ALBUMIN SERPL-MCNC: 4.3 G/DL (ref 3.4–5)
ANION GAP SERPL CALCULATED.3IONS-SCNC: 17 MMOL/L (ref 3–16)
BACTERIA: ABNORMAL /HPF
BILIRUBIN URINE: NEGATIVE
BLOOD, URINE: NEGATIVE
BUN BLDV-MCNC: 33 MG/DL (ref 7–20)
CALCIUM SERPL-MCNC: 9.3 MG/DL (ref 8.3–10.6)
CHLORIDE BLD-SCNC: 109 MMOL/L (ref 99–110)
CLARITY: CLEAR
CO2: 22 MMOL/L (ref 21–32)
COLOR: YELLOW
CREAT SERPL-MCNC: 2.3 MG/DL (ref 0.8–1.3)
CREATININE URINE: 56.4 MG/DL (ref 39–259)
EPITHELIAL CELLS, UA: 0 /HPF (ref 0–5)
GFR AFRICAN AMERICAN: 34
GFR NON-AFRICAN AMERICAN: 28
GLUCOSE BLD-MCNC: 119 MG/DL (ref 70–99)
GLUCOSE URINE: NEGATIVE MG/DL
HYALINE CASTS: 0 /LPF (ref 0–8)
KETONES, URINE: NEGATIVE MG/DL
LEUKOCYTE ESTERASE, URINE: ABNORMAL
MAGNESIUM: 2.1 MG/DL (ref 1.8–2.4)
MICROSCOPIC EXAMINATION: YES
NITRITE, URINE: POSITIVE
PH UA: 7 (ref 5–8)
PHOSPHORUS: 3.3 MG/DL (ref 2.5–4.9)
POTASSIUM SERPL-SCNC: 4.8 MMOL/L (ref 3.5–5.1)
PROTEIN PROTEIN: 25 MG/DL
PROTEIN UA: 30 MG/DL
PROTEIN/CREAT RATIO: 0.4 MG/DL
RBC UA: 1 /HPF (ref 0–4)
SODIUM BLD-SCNC: 148 MMOL/L (ref 136–145)
SPECIFIC GRAVITY UA: 1.01 (ref 1–1.03)
URINE TYPE: ABNORMAL
UROBILINOGEN, URINE: 0.2 E.U./DL
WBC UA: 32 /HPF (ref 0–5)

## 2022-10-20 RX ORDER — TAMSULOSIN HYDROCHLORIDE 0.4 MG/1
0.4 CAPSULE ORAL DAILY
Qty: 90 CAPSULE | Refills: 5 | Status: SHIPPED | OUTPATIENT
Start: 2022-10-20 | End: 2022-11-19

## 2022-11-18 NOTE — PROGRESS NOTES
APO-Varenicline 0.5MG tablets: The patient currently has access to the requested medication and a Prior Authorization is not needed for the patient/medication.     APO-Varenicline 1MG tablets: Approved today Your request has been approved Patient here today for Testosterone injection. Verbal consent given per patient for injection.    Patient denies any side effects at this time.    200 mg Testosterone given IM without difficulty into R dorsogluteal.  Patient d/c ambulatory at this time.   Follow up 3 weeks.

## 2022-11-22 ENCOUNTER — TELEPHONE (OUTPATIENT)
Dept: INFUSION THERAPY | Age: 72
End: 2022-11-22

## 2022-11-22 ENCOUNTER — APPOINTMENT (OUTPATIENT)
Dept: INFUSION THERAPY | Age: 72
End: 2022-11-22
Payer: COMMERCIAL

## 2022-11-22 NOTE — TELEPHONE ENCOUNTER
Returned call to pt & cancelled lab + injection appt for today per pt's request. Pt also requested to cancel next wks ov.       Lab + injection rescheduled to 12/8 @ 2:30  OV rescheduled to 12/15 @ 11:30; pt voiced understanding of dates + times

## 2022-12-08 ENCOUNTER — HOSPITAL ENCOUNTER (OUTPATIENT)
Dept: INFUSION THERAPY | Age: 72
Discharge: HOME | End: 2022-12-08
Payer: COMMERCIAL

## 2022-12-08 DIAGNOSIS — M84.50XA PATHOLOGICAL FRACTURE DUE TO METASTATIC BONE DISEASE: ICD-10-CM

## 2022-12-08 DIAGNOSIS — C79.52 SECONDARY MALIGNANT NEOPLASM OF BONE AND BONE MARROW (HCC): Primary | ICD-10-CM

## 2022-12-08 DIAGNOSIS — T38.7X5A OSTEOPOROSIS DUE TO ANDROGEN THERAPY: ICD-10-CM

## 2022-12-08 DIAGNOSIS — C79.51 SECONDARY MALIGNANT NEOPLASM OF BONE AND BONE MARROW (HCC): Primary | ICD-10-CM

## 2022-12-08 DIAGNOSIS — Z12.2 ENCOUNTER FOR SCREENING FOR MALIGNANT NEOPLASM OF LUNG: ICD-10-CM

## 2022-12-08 DIAGNOSIS — M81.8 OSTEOPOROSIS DUE TO ANDROGEN THERAPY: ICD-10-CM

## 2022-12-08 DIAGNOSIS — D64.9 ANEMIA, UNSPECIFIED TYPE: ICD-10-CM

## 2022-12-08 DIAGNOSIS — C61 PROSTATE CANCER (HCC): ICD-10-CM

## 2022-12-08 LAB
ALBUMIN SERPL-MCNC: 4 GM/DL (ref 3.4–5)
ALP BLD-CCNC: 123 IU/L (ref 40–129)
ALT SERPL-CCNC: 8 U/L (ref 10–40)
ANION GAP SERPL CALCULATED.3IONS-SCNC: 13 MMOL/L (ref 4–16)
AST SERPL-CCNC: 14 IU/L (ref 15–37)
BASOPHILS ABSOLUTE: 0 K/CU MM
BASOPHILS RELATIVE PERCENT: 0.2 % (ref 0–1)
BILIRUB SERPL-MCNC: 0.4 MG/DL (ref 0–1)
BUN BLDV-MCNC: 47 MG/DL (ref 6–23)
CALCIUM SERPL-MCNC: 9 MG/DL (ref 8.3–10.6)
CHLORIDE BLD-SCNC: 107 MMOL/L (ref 99–110)
CO2: 22 MMOL/L (ref 21–32)
CREAT SERPL-MCNC: 2.7 MG/DL (ref 0.9–1.3)
DIFFERENTIAL TYPE: ABNORMAL
EOSINOPHILS ABSOLUTE: 0.3 K/CU MM
EOSINOPHILS RELATIVE PERCENT: 3 % (ref 0–3)
ERYTHROCYTE SEDIMENTATION RATE: 86 MM/HR (ref 0–20)
FERRITIN: 603 NG/ML (ref 30–400)
FOLATE: 5.8 NG/ML (ref 3.1–17.5)
GFR SERPL CREATININE-BSD FRML MDRD: 24 ML/MIN/1.73M2
GLUCOSE BLD-MCNC: 110 MG/DL (ref 70–99)
HCT VFR BLD CALC: 35.3 % (ref 42–52)
HEMOGLOBIN: 11 GM/DL (ref 13.5–18)
IRON: 64 UG/DL (ref 59–158)
LYMPHOCYTES ABSOLUTE: 1.9 K/CU MM
LYMPHOCYTES RELATIVE PERCENT: 20.6 % (ref 24–44)
MCH RBC QN AUTO: 31.6 PG (ref 27–31)
MCHC RBC AUTO-ENTMCNC: 31.2 % (ref 32–36)
MCV RBC AUTO: 101.4 FL (ref 78–100)
MONOCYTES ABSOLUTE: 0.7 K/CU MM
MONOCYTES RELATIVE PERCENT: 7.3 % (ref 0–4)
PCT TRANSFERRIN: 19 % (ref 10–44)
PDW BLD-RTO: 12.4 % (ref 11.7–14.9)
PLATELET # BLD: 228 K/CU MM (ref 140–440)
PMV BLD AUTO: 9 FL (ref 7.5–11.1)
POTASSIUM SERPL-SCNC: 4.8 MMOL/L (ref 3.5–5.1)
RBC # BLD: 3.48 M/CU MM (ref 4.6–6.2)
SEGMENTED NEUTROPHILS ABSOLUTE COUNT: 6.2 K/CU MM
SEGMENTED NEUTROPHILS RELATIVE PERCENT: 68.9 % (ref 36–66)
SODIUM BLD-SCNC: 142 MMOL/L (ref 135–145)
TOTAL IRON BINDING CAPACITY: 345 UG/DL (ref 250–450)
TOTAL PROTEIN: 7 GM/DL (ref 6.4–8.2)
UNSATURATED IRON BINDING CAPACITY: 281 UG/DL (ref 110–370)
VITAMIN B-12: 626.8 PG/ML (ref 211–911)
WBC # BLD: 9.1 K/CU MM (ref 4–10.5)

## 2022-12-08 PROCEDURE — 82728 ASSAY OF FERRITIN: CPT

## 2022-12-08 PROCEDURE — 84153 ASSAY OF PSA TOTAL: CPT

## 2022-12-08 PROCEDURE — 83540 ASSAY OF IRON: CPT

## 2022-12-08 PROCEDURE — 85652 RBC SED RATE AUTOMATED: CPT

## 2022-12-08 PROCEDURE — 82746 ASSAY OF FOLIC ACID SERUM: CPT

## 2022-12-08 PROCEDURE — 82607 VITAMIN B-12: CPT

## 2022-12-08 PROCEDURE — 83550 IRON BINDING TEST: CPT

## 2022-12-08 PROCEDURE — 6360000002 HC RX W HCPCS: Performed by: INTERNAL MEDICINE

## 2022-12-08 PROCEDURE — 85025 COMPLETE CBC W/AUTO DIFF WBC: CPT

## 2022-12-08 PROCEDURE — 36415 COLL VENOUS BLD VENIPUNCTURE: CPT

## 2022-12-08 PROCEDURE — 96402 CHEMO HORMON ANTINEOPL SQ/IM: CPT

## 2022-12-08 PROCEDURE — 80053 COMPREHEN METABOLIC PANEL: CPT

## 2022-12-08 PROCEDURE — 84154 ASSAY OF PSA FREE: CPT

## 2022-12-08 RX ADMIN — LEUPROLIDE ACETATE 22.5 MG: KIT SUBCUTANEOUS at 14:53

## 2022-12-08 NOTE — PROGRESS NOTES
Pt here for Lupron injection and labs. Pt has no complaints. Injection given SQ to RUQ, cold spray used for pt comfort. Pt tolerated injection with no complaints. Left  ambulatory, discharge  instructions given.

## 2022-12-13 ENCOUNTER — TELEPHONE (OUTPATIENT)
Dept: ONCOLOGY | Age: 72
End: 2022-12-13

## 2022-12-15 ENCOUNTER — OFFICE VISIT (OUTPATIENT)
Dept: ONCOLOGY | Age: 72
End: 2022-12-15
Payer: COMMERCIAL

## 2022-12-15 ENCOUNTER — HOSPITAL ENCOUNTER (OUTPATIENT)
Dept: INFUSION THERAPY | Age: 72
Discharge: HOME | End: 2022-12-15
Payer: COMMERCIAL

## 2022-12-15 VITALS
TEMPERATURE: 97 F | HEIGHT: 74 IN | OXYGEN SATURATION: 97 % | RESPIRATION RATE: 18 BRPM | HEART RATE: 111 BPM | DIASTOLIC BLOOD PRESSURE: 52 MMHG | WEIGHT: 224 LBS | SYSTOLIC BLOOD PRESSURE: 107 MMHG | BODY MASS INDEX: 28.75 KG/M2

## 2022-12-15 DIAGNOSIS — C61 PROSTATE CANCER (HCC): Primary | ICD-10-CM

## 2022-12-15 DIAGNOSIS — T38.7X5A OSTEOPOROSIS DUE TO ANDROGEN THERAPY: ICD-10-CM

## 2022-12-15 DIAGNOSIS — M81.8 OSTEOPOROSIS DUE TO ANDROGEN THERAPY: ICD-10-CM

## 2022-12-15 DIAGNOSIS — C79.52 SECONDARY MALIGNANT NEOPLASM OF BONE AND BONE MARROW (HCC): ICD-10-CM

## 2022-12-15 DIAGNOSIS — D64.9 ANEMIA, UNSPECIFIED TYPE: ICD-10-CM

## 2022-12-15 DIAGNOSIS — C79.51 SECONDARY MALIGNANT NEOPLASM OF BONE AND BONE MARROW (HCC): ICD-10-CM

## 2022-12-15 PROCEDURE — 99211 OFF/OP EST MAY X REQ PHY/QHP: CPT

## 2022-12-15 PROCEDURE — 3078F DIAST BP <80 MM HG: CPT | Performed by: INTERNAL MEDICINE

## 2022-12-15 PROCEDURE — 99214 OFFICE O/P EST MOD 30 MIN: CPT | Performed by: INTERNAL MEDICINE

## 2022-12-15 PROCEDURE — 1123F ACP DISCUSS/DSCN MKR DOCD: CPT | Performed by: INTERNAL MEDICINE

## 2022-12-15 PROCEDURE — 3074F SYST BP LT 130 MM HG: CPT | Performed by: INTERNAL MEDICINE

## 2022-12-15 NOTE — PROGRESS NOTES
Patient Name: Shirley Hill  Patient : 1950  Patient MRN: [id number]     Primary Oncologist: Dagmar Virgen MD  Referring Physician: Dolores Meigs, MD, Libra Fuentes   Urologist: Dr Lindsey Duran       Date of Service: 12/15/2022      Chief Complaint:   Chief Complaint   Patient presents with    Follow-up        Active Problem list  1. Prostate cancer ([address]    2. 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 Anemia, unspecified type    4. 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 A vazquez was placed and he has been hydrated. Renal and urology are on board. Has some right shoulder pain. Lost 20 lbs in the past few months. ONC history  Hx of prostate cancer s/p XRT  Treated at [facility]  October 10, 2017 showed multiple pleural-based lung nodules of bilateral lung bases with the largest measuring 1.2 cm of posterior right lung base new since 2014. 2017 admission for hyperkalemia acute renal failure, CT head negative for intracranial abnormality. CT abdomen pelvis showed multiple lung base pulmonary nodules. Blastic lesions within the visualized thoracic and lumbar spine. Findings concerning for metastatic disease. Progressive bilateral hydroureteronephrosis without evidence for obstructing calculus  November 10, 2017 bone scan showed multifocal axial and appendicular skeletal disease. Casodex was started.  PSA was 112.3  2017 Bone biopsy showed metastatic adenocarcinoma the prostate Based on L5 bone biopsy   saw DR Mario Alberto Coleman)   lupron started  dec 2017 s/p EGD essentially normal  dec 13,2017 started cycle #1 docetaxel, received 3C until  Admitted for hyperkalemia, underwent a temporary hemodialysis, PSA was 3  Then placed on Zytiga until August 2018 feb 2018 CT CAP showed Osteoblastic skeletal metastatic disease. Tiny pulmonary nodules are almost certainly postinflammatory. No definite  findings of intrathoracic or intra abdominopelvic metastatic disease and he also developed some neuropathy in his left 5th digit, PSA < 2   june 2018 = 0.1  August 2018 he started taking Xtandi  august 2018 = 0.1  december 2018 PSA 0.04   3-19 pSA 0.01  6-19 PSA < 0.1   9-19 PSA < 0.1  12/27/2019: PSA <0.1    9/30/20: MRI Hip:  Marrow replacing osseous lesions seen in the bilateral superior acetabulum    and left iliac bone suspicous for osseous metastases. Lesions in the left    superior acetabulum and left iliac bone are included in the field of view on    the post-contrast sequences and demonstrate heterogeneous enhancement. There    is an additional peripherally sclerotic lesion in the left subtrochanteric    proximal femur without definite internal enhancement but demonstrates    peripheral enhancement. These findings could be further assessed with a    nuclear medicine bone scan. Severe left hip joint osteoarthrosis with bony remodeling of the femoral    head. There is a large left hip joint effusion with a lobular T2    hyperintense lesion at the posterosuperior margin of the left hip joint. This appears chronic in nature and could represent synovial    proliferation/synovitis. This would have an atypical appearance for    metastatic disease. Generalized edema in the left adductor, obturator, and gluteus minimus    musculature. Fluid bright signal in the region of the quadratus femoris muscle could    represent ischiofemoral bursitis. 1/21/21:NBS  Activity is demonstrated at left greater than right acetabulum, compatible   with that marrow signal abnormality on recent MR, and can reflect osseous   metastatic disease given suspicion for such on MR.  Additional considerations   which can have this bone scan appearance include arthropathy or osteomyelitis. Asymmetric focal activity within the proximal left tibial diaphysis, which   can be due to healing trauma or metastatic disease. If patient is focally   symptomatic, radiograph could be considered. Multifocal degenerative changes are otherwise favored as outlined above. 1/24/21: PSA <0.1    9/29/21: CT chest:  1. Left upper lobe 4 mm ground-glass density nodule. 2. Decreased sclerosis involving the presumed prostate metastases in the   thoracic spine when compared to 2018. No new lytic or blastic osseous lesion. 3. Moderate coronary atherosclerosis       LUNG RADS:   Per ACR Lung-RADS Version 1.1       Category 2, Benign appearance or behavior. Management:  Continue annual lung screening with LDCT in 12 months.     9/29/21: Dexa scan normal.     PMH: CAD, HTN, HLP, DM, CKD    PSH: upper and lower endoscopy    SH: Lives alone. Three boys. Quit smoking long time ago but started smoking 1PP two and half days. Prior to quitting in 2014 he smoked 1-2 ppd for 40 years. Also h/O ETOH abuse. No other illicit drug abuse    FH: Whole family , his father had 8 sibling and all had cancer, does know what cancer. Has a brother and sister but no cancer diagnosis. Interval History  12/15/2022:Arrived alone to the clinic. Reported that when it rains his left hip bothers a lot and not able ambulate much. No pain when he sits or lies down. No LLE weakness. Tries Ambulates at home as much as he can. No abdominal pain, pain. No hew  sx. Lost 5 lbs since last visit, poor appetite but has lost few relatives lately.      Review of Systems   Per interval history; otherwise 10 point ROS is negative              Vital Signs:  BP (!) 107/52 (Site: Right Upper Arm, Position: Sitting, Cuff Size: Medium Adult)   Pulse (!) 111   Temp 97 °F (36.1 °C) (Infrared)   Resp 18   Ht 6' 2\" (1.88 m)   Wt 224 lb (101.6 kg) SpO2 97%   BMI 28.76 kg/m²     Physical Exam:    CONSTITUTIONAL: awake, alert, tired appearing  EYES: No palor or any icterus  ENT: ATNC  NECK: No JVD  HEMATOLOGIC/LYMPHATIC: no cervical, supraclavicular or axillary lymphadenopathy   LUNGS: CTAB  CARDIOVASCULAR: s1s2 rrr  ABDOMEN: normal bowel sounds x 4, soft, non-distended, non-tender, no masses palpated, no hepatosplenomegaly   NEUROLOGIC: GI  SKIN: No rash      Labs:    Hematology:  Lab Results   Component Value Date    WBC 9.1 12/08/2022    RBC 3.48 (L) 12/08/2022    HGB 11.0 (L) 12/08/2022    HCT 35.3 (L) 12/08/2022    .4 (H) 12/08/2022    MCH 31.6 (H) 12/08/2022    MCHC 31.2 (L) 12/08/2022    RDW 12.4 12/08/2022     12/08/2022    MPV 9.0 12/08/2022    BANDSPCT 4 (L) 10/08/2017    SEGSPCT 68.9 (H) 12/08/2022    EOSRELPCT 3.0 12/08/2022    BASOPCT 0.2 12/08/2022    LYMPHOPCT 20.6 (L) 12/08/2022    MONOPCT 7.3 (H) 12/08/2022    BANDABS 0.64 10/08/2017    SEGSABS 6.2 12/08/2022    EOSABS 0.3 12/08/2022    BASOSABS 0.0 12/08/2022    LYMPHSABS 1.9 12/08/2022    MONOSABS 0.7 12/08/2022    DIFFTYPE AUTOMATED DIFFERENTIAL 12/08/2022    ANISOCYTOSIS 1+ 10/08/2017    POLYCHROM 1+ 10/08/2017     Lab Results   Component Value Date    ESR 86 (H) 12/08/2022       Chemistry:  Lab Results   Component Value Date     12/08/2022    K 4.8 12/08/2022     12/08/2022    CO2 22 12/08/2022    BUN 47 (H) 12/08/2022    CREATININE 2.7 (H) 12/08/2022    GLUCOSE 110 (H) 12/08/2022    CALCIUM 9.0 12/08/2022    PROT 7.0 12/08/2022    LABALBU 4.0 12/08/2022    BILITOT 0.4 12/08/2022    ALKPHOS 123 12/08/2022    AST 14 (L) 12/08/2022    ALT 8 (L) 12/08/2022    LABGLOM 24 (L) 12/08/2022    GFRAA 34 (A) 10/03/2022    AGRATIO 1.2 01/07/2019    GLOB 3.4 01/07/2019    PHOS 3.3 10/03/2022    MG 2.10 10/03/2022    POCGLU 109 (H) 01/15/2018     Lab Results   Component Value Date     06/28/2021     No components found for: LD  Lab Results   Component Value Date TSH 1.980 01/14/2018       Immunology:  Lab Results   Component Value Date    PROT 7.0 12/08/2022    SPEP  08/31/2022     INTERPRETATION - Monoclonal gammopathy composed of free lambda light chains, measuring 400 mg/dL. LP.    SPEP  08/31/2022     INTERPRETATION - Monoclonal free lambda with a possible faint IgG heavy chain component. LP.    ALBUMINELP 3.4 08/31/2022    LABALPH 0.3 08/31/2022    LABALPH 0.8 08/31/2022    LABBETA 1.2 08/31/2022    GAMGLOB 1.4 08/31/2022     No results found for: Cher Tidwell, KLFLCR  No results found for: B2M    Coagulation Panel:  Lab Results   Component Value Date    PROTIME 12.9 (H) 02/20/2018    INR 1.13 02/20/2018    APTT 49.7 (H) 11/13/2017       Anemia Panel:  Lab Results   Component Value Date    EXVBYVKK69 626.8 12/08/2022    FOLATE 5.8 12/08/2022       Tumor Markers:  Lab Results   Component Value Date    PSA <0.1 12/08/2022         Imaging: Reviewed     Pathology:Reviewed     Observations:  Performance Status: ECOG 1  Depression Status: No data recorded          Assessment & Plan:  68 yo male admitted for ARF and hyperkalemia found to Bilateral hydroureteronephrosis and pulmonary nodules and blastic lesions within the thoracic and lumbar spine With metastatic prostate cancer Found on biopsy of L5 with .3   -CT CAP feb 2018 shows osseous disease, MRI left hip October 2020 with possible osseous disease and left hip effusion. -NBS Jan 2021 with possible mets vs arthritis. Referred to radiation oncology, they did not recommend palliative XRT,  as they thought its probably sec to some other etiology. Was also evaluated by ortho who did not recommend any surgical intervention.   -was on zytiga jan 18 till august 2018  -Switched to [place] in august 2018  -dec  2022 PSA <0.1  -cont enzalumatide  +Tamsulosine. Change  GnRH analogue q6M and recheck psa every 3 M     Anemia/elevated ferritin: NC. Hb 11.1Most probably contributed by AOKD, underlying bone mets. Looks like MGUS, will monitor. Note ferritin elevated, may have underlying MDS with RS. No liver disease. Defers further evaluation including BMB/aspiration. 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   -defers changing analgesic regimen, adequate bowel regimen. CKD stage III, notified regarding rise in creatinine. Recommend that he follows with Dr Mariely Whiting. Avoid nephrotoxic medication and adequate fluid intake. May need antihypertensive adjusted    HTN, low today and note creatinine increased. Adequate fluids. As above     EUGENE nodule: 4 mm EUGENE nodule, recommend repeat in 12M    Continue other medical care    Discussed the above findings and plan with him and he verbalized understanding    Recommend follow up with PCP and other specialists    Discussed healthy LS, helathy diet and recommend being uptodate with age appropriate screening tool.      RTC   march 2023 or earlier if new sx      MARIAM

## 2022-12-15 NOTE — PROGRESS NOTES
MA Rooming Questions  Patient: Patrick Meza  MRN: [id number]    Date: 12/15/2022        1. Do you have any new issues? yes - Pt would like to know what he can do in regards to hip pain. 2. Do you need any refills on medications?    no    3. Have you had any imaging done since your last visit?   no    4. Have you been hospitalized or seen in the emergency room since your last visit here?   no    5. Did the patient have a depression screening completed today?  No    No data recorded     PHQ-9 Given to (if applicable):               PHQ-9 Score (if applicable):                     [] Positive     []  Negative              Does question #9 need addressed (if applicable)                     [] Yes    []  No               Edward Campo MA

## 2022-12-19 DIAGNOSIS — C79.52 SECONDARY MALIGNANT NEOPLASM OF BONE AND BONE MARROW (HCC): ICD-10-CM

## 2022-12-19 DIAGNOSIS — C79.51 SECONDARY MALIGNANT NEOPLASM OF BONE AND BONE MARROW (HCC): ICD-10-CM

## 2022-12-19 DIAGNOSIS — C61 PROSTATE CANCER (HCC): ICD-10-CM

## 2022-12-19 RX ORDER — OXYCODONE AND ACETAMINOPHEN 7.5; 325 MG/1; MG/1
1 TABLET ORAL 2 TIMES DAILY PRN
Qty: 60 TABLET | Refills: 0 | Status: SHIPPED | OUTPATIENT
Start: 2022-12-19 | End: 2023-01-18

## 2023-01-17 DIAGNOSIS — C79.51 SECONDARY MALIGNANT NEOPLASM OF BONE AND BONE MARROW (HCC): ICD-10-CM

## 2023-01-17 DIAGNOSIS — C61 PROSTATE CANCER (HCC): ICD-10-CM

## 2023-01-17 DIAGNOSIS — C79.52 SECONDARY MALIGNANT NEOPLASM OF BONE AND BONE MARROW (HCC): ICD-10-CM

## 2023-01-17 RX ORDER — OXYCODONE AND ACETAMINOPHEN 7.5; 325 MG/1; MG/1
1 TABLET ORAL 2 TIMES DAILY PRN
Qty: 60 TABLET | Refills: 0 | Status: SHIPPED | OUTPATIENT
Start: 2023-01-17 | End: 2023-02-16

## 2023-03-02 ENCOUNTER — HOSPITAL ENCOUNTER (OUTPATIENT)
Dept: INFUSION THERAPY | Age: 73
Discharge: HOME | End: 2023-03-02
Payer: COMMERCIAL

## 2023-03-02 DIAGNOSIS — M81.8 OSTEOPOROSIS DUE TO ANDROGEN THERAPY: ICD-10-CM

## 2023-03-02 DIAGNOSIS — D64.9 ANEMIA, UNSPECIFIED TYPE: ICD-10-CM

## 2023-03-02 DIAGNOSIS — C79.52 SECONDARY MALIGNANT NEOPLASM OF BONE AND BONE MARROW (HCC): ICD-10-CM

## 2023-03-02 DIAGNOSIS — C79.51 SECONDARY MALIGNANT NEOPLASM OF BONE AND BONE MARROW (HCC): ICD-10-CM

## 2023-03-02 DIAGNOSIS — T38.7X5A OSTEOPOROSIS DUE TO ANDROGEN THERAPY: ICD-10-CM

## 2023-03-02 DIAGNOSIS — C61 PROSTATE CANCER (HCC): Primary | ICD-10-CM

## 2023-03-02 LAB
ALBUMIN SERPL-MCNC: 4.3 GM/DL (ref 3.4–5)
ALP BLD-CCNC: 145 IU/L (ref 40–129)
ALT SERPL-CCNC: 7 U/L (ref 10–40)
ANION GAP SERPL CALCULATED.3IONS-SCNC: 12 MMOL/L (ref 4–16)
AST SERPL-CCNC: 14 IU/L (ref 15–37)
BASOPHILS ABSOLUTE: 0 K/CU MM
BASOPHILS RELATIVE PERCENT: 0.2 % (ref 0–1)
BILIRUB SERPL-MCNC: 0.5 MG/DL (ref 0–1)
BUN SERPL-MCNC: 29 MG/DL (ref 6–23)
CALCIUM SERPL-MCNC: 9.1 MG/DL (ref 8.3–10.6)
CHLORIDE BLD-SCNC: 105 MMOL/L (ref 99–110)
CO2: 26 MMOL/L (ref 21–32)
CREAT SERPL-MCNC: 2.4 MG/DL (ref 0.9–1.3)
DIFFERENTIAL TYPE: ABNORMAL
EOSINOPHILS ABSOLUTE: 0.2 K/CU MM
EOSINOPHILS RELATIVE PERCENT: 2.8 % (ref 0–3)
FERRITIN: 642 NG/ML (ref 30–400)
GFR SERPL CREATININE-BSD FRML MDRD: 28 ML/MIN/1.73M2
GLUCOSE SERPL-MCNC: 111 MG/DL (ref 70–99)
HCT VFR BLD CALC: 36.7 % (ref 42–52)
HEMOGLOBIN: 11.5 GM/DL (ref 13.5–18)
IRON: 58 UG/DL (ref 59–158)
LYMPHOCYTES ABSOLUTE: 1.5 K/CU MM
LYMPHOCYTES RELATIVE PERCENT: 17.7 % (ref 24–44)
MCH RBC QN AUTO: 31.1 PG (ref 27–31)
MCHC RBC AUTO-ENTMCNC: 31.3 % (ref 32–36)
MCV RBC AUTO: 99.2 FL (ref 78–100)
MONOCYTES ABSOLUTE: 0.5 K/CU MM
MONOCYTES RELATIVE PERCENT: 6 % (ref 0–4)
PCT TRANSFERRIN: 17 % (ref 10–44)
PDW BLD-RTO: 12.6 % (ref 11.7–14.9)
PLATELET # BLD: 218 K/CU MM (ref 140–440)
PMV BLD AUTO: 9.2 FL (ref 7.5–11.1)
POTASSIUM SERPL-SCNC: 4.9 MMOL/L (ref 3.5–5.1)
RBC # BLD: 3.7 M/CU MM (ref 4.6–6.2)
SEGMENTED NEUTROPHILS ABSOLUTE COUNT: 6.2 K/CU MM
SEGMENTED NEUTROPHILS RELATIVE PERCENT: 73.3 % (ref 36–66)
SODIUM BLD-SCNC: 143 MMOL/L (ref 135–145)
TOTAL IRON BINDING CAPACITY: 335 UG/DL (ref 250–450)
TOTAL PROTEIN: 7.2 GM/DL (ref 6.4–8.2)
UNSATURATED IRON BINDING CAPACITY: 277 UG/DL (ref 110–370)
WBC # BLD: 8.5 K/CU MM (ref 4–10.5)

## 2023-03-02 PROCEDURE — 83540 ASSAY OF IRON: CPT

## 2023-03-02 PROCEDURE — 85025 COMPLETE CBC W/AUTO DIFF WBC: CPT

## 2023-03-02 PROCEDURE — 80053 COMPREHEN METABOLIC PANEL: CPT

## 2023-03-02 PROCEDURE — 6360000002 HC RX W HCPCS: Performed by: INTERNAL MEDICINE

## 2023-03-02 PROCEDURE — 82728 ASSAY OF FERRITIN: CPT

## 2023-03-02 PROCEDURE — 36415 COLL VENOUS BLD VENIPUNCTURE: CPT

## 2023-03-02 PROCEDURE — 84154 ASSAY OF PSA FREE: CPT

## 2023-03-02 PROCEDURE — 83550 IRON BINDING TEST: CPT

## 2023-03-02 PROCEDURE — 96402 CHEMO HORMON ANTINEOPL SQ/IM: CPT

## 2023-03-02 PROCEDURE — 84153 ASSAY OF PSA TOTAL: CPT

## 2023-03-02 RX ADMIN — LEUPROLIDE ACETATE 45 MG: KIT SUBCUTANEOUS at 15:06

## 2023-03-02 NOTE — PROGRESS NOTES
Patient arrived to treatment  suite for Eligard injection. No questions or concerns for the doctor at this time. Treatment approved and given subcutaneously in right abdomen, band-aid applied. Patient tolerated well. Injection has been changed from every 3 to every 6 months. Patient rescheduled to reflect this change. Left treatment suite ambulatory. Discharge instructions provided.

## 2023-03-03 ENCOUNTER — TELEPHONE (OUTPATIENT)
Dept: INFUSION THERAPY | Age: 73
End: 2023-03-03

## 2023-03-03 NOTE — TELEPHONE ENCOUNTER
Pt called stating he received his injection yesterday by Jeana Puri RN & this morning he has a bump at the injection site.     Call transferred to [facility]

## 2023-03-05 LAB
PSA FREE MFR SERPL: NORMAL %
PSA FREE SERPL-MCNC: <0.1 NG/ML
PSA SERPL IA-MCNC: <0.1 NG/ML (ref 0–4)

## 2023-03-09 ENCOUNTER — OFFICE VISIT (OUTPATIENT)
Dept: ONCOLOGY | Age: 73
End: 2023-03-09
Payer: COMMERCIAL

## 2023-03-09 ENCOUNTER — HOSPITAL ENCOUNTER (OUTPATIENT)
Dept: INFUSION THERAPY | Age: 73
Discharge: HOME | End: 2023-03-09
Payer: COMMERCIAL

## 2023-03-09 VITALS
HEART RATE: 101 BPM | HEIGHT: 74 IN | BODY MASS INDEX: 28.88 KG/M2 | TEMPERATURE: 96.9 F | DIASTOLIC BLOOD PRESSURE: 60 MMHG | WEIGHT: 225 LBS | SYSTOLIC BLOOD PRESSURE: 119 MMHG | OXYGEN SATURATION: 98 %

## 2023-03-09 DIAGNOSIS — C79.51 SECONDARY MALIGNANT NEOPLASM OF BONE AND BONE MARROW (HCC): ICD-10-CM

## 2023-03-09 DIAGNOSIS — C79.52 SECONDARY MALIGNANT NEOPLASM OF BONE AND BONE MARROW (HCC): ICD-10-CM

## 2023-03-09 DIAGNOSIS — C61 PROSTATE CANCER (HCC): Primary | ICD-10-CM

## 2023-03-09 PROCEDURE — 3078F DIAST BP <80 MM HG: CPT | Performed by: INTERNAL MEDICINE

## 2023-03-09 PROCEDURE — 99211 OFF/OP EST MAY X REQ PHY/QHP: CPT

## 2023-03-09 PROCEDURE — 3074F SYST BP LT 130 MM HG: CPT | Performed by: INTERNAL MEDICINE

## 2023-03-09 PROCEDURE — 1123F ACP DISCUSS/DSCN MKR DOCD: CPT | Performed by: INTERNAL MEDICINE

## 2023-03-09 PROCEDURE — 99214 OFFICE O/P EST MOD 30 MIN: CPT | Performed by: INTERNAL MEDICINE

## 2023-03-09 ASSESSMENT — PATIENT HEALTH QUESTIONNAIRE - PHQ9
2. FEELING DOWN, DEPRESSED OR HOPELESS: 0
SUM OF ALL RESPONSES TO PHQ9 QUESTIONS 1 & 2: 0
SUM OF ALL RESPONSES TO PHQ QUESTIONS 1-9: 0
SUM OF ALL RESPONSES TO PHQ QUESTIONS 1-9: 0
1. LITTLE INTEREST OR PLEASURE IN DOING THINGS: 0
SUM OF ALL RESPONSES TO PHQ QUESTIONS 1-9: 0
SUM OF ALL RESPONSES TO PHQ QUESTIONS 1-9: 0

## 2023-03-09 NOTE — PROGRESS NOTES
Patient Name: Gwendolyn Valle  Patient : 1950  Patient MRN: [id number]     Primary Oncologist: Mary Burks MD  Referring Physician: Maryanne Estevez MD, Jody Ervin   Urologist: Dr Arleen Toney       Date of Service: 3/9/2023      Chief Complaint:   Chief Complaint   Patient presents with    Follow-up          Active Problem list  1. Prostate cancer ([address]    2. 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 A vazquez was placed and he has been hydrated. Renal and urology are on board. Has some right shoulder pain. Lost 20 lbs in the past few months. ONC history  Hx of prostate cancer s/p XRT  Treated at [facility]  October 10, 2017 showed multiple pleural-based lung nodules of bilateral lung bases with the largest measuring 1.2 cm of posterior right lung base new since 2014. 2017 admission for hyperkalemia acute renal failure, CT head negative for intracranial abnormality. CT abdomen pelvis showed multiple lung base pulmonary nodules. Blastic lesions within the visualized thoracic and lumbar spine. Findings concerning for metastatic disease. Progressive bilateral hydroureteronephrosis without evidence for obstructing calculus  November 10, 2017 bone scan showed multifocal axial and appendicular skeletal disease. Casodex was started.  PSA was 112.3  2017 Bone biopsy showed metastatic adenocarcinoma the prostate Based on L5 bone biopsy   saw DR Arianne Bergman)   lupron started  dec 2017 s/p EGD essentially normal  dec 13,2017 started cycle #1 docetaxel, received 3C until  Admitted for hyperkalemia, underwent a temporary hemodialysis, PSA was 3  Then placed on Zytiga until  CT CAP showed Osteoblastic skeletal metastatic disease. Tiny pulmonary nodules are almost certainly postinflammatory. No definite  findings of intrathoracic or intra abdominopelvic metastatic disease and he also developed some neuropathy in his left 5th digit, PSA < 2   june 2018 = 0.1  August 2018 he started taking Xtandi  august 2018 = 0.1  december 2018 PSA 0.04   3-19 pSA 0.01  6-19 PSA < 0.1   9-19 PSA < 0.1  12/27/2019: PSA <0.1    9/30/20: MRI Hip:  Marrow replacing osseous lesions seen in the bilateral superior acetabulum    and left iliac bone suspicous for osseous metastases. Lesions in the left    superior acetabulum and left iliac bone are included in the field of view on    the post-contrast sequences and demonstrate heterogeneous enhancement. There    is an additional peripherally sclerotic lesion in the left subtrochanteric    proximal femur without definite internal enhancement but demonstrates    peripheral enhancement. These findings could be further assessed with a    nuclear medicine bone scan. Severe left hip joint osteoarthrosis with bony remodeling of the femoral    head. There is a large left hip joint effusion with a lobular T2    hyperintense lesion at the posterosuperior margin of the left hip joint. This appears chronic in nature and could represent synovial    proliferation/synovitis. This would have an atypical appearance for    metastatic disease. Generalized edema in the left adductor, obturator, and gluteus minimus    musculature. Fluid bright signal in the region of the quadratus femoris muscle could    represent ischiofemoral bursitis. 1/21/21:NBS  Activity is demonstrated at left greater than right acetabulum, compatible   with that marrow signal abnormality on recent MR, and can reflect osseous   metastatic disease given suspicion for such on MR.  Additional considerations   which can have this bone scan appearance include arthropathy or osteomyelitis.       Asymmetric focal activity within the proximal left tibial diaphysis, which   can be due to healing trauma or metastatic disease.  If patient is focally   symptomatic, radiograph could be considered.       Multifocal degenerative changes are otherwise favored as outlined above.     1/24/21: PSA <0.1    9/29/21: CT chest:  1. Left upper lobe 4 mm ground-glass density nodule.   2. Decreased sclerosis involving the presumed prostate metastases in the   thoracic spine when compared to 2018.  No new lytic or blastic osseous lesion.   3. Moderate coronary atherosclerosis       LUNG RADS:   Per ACR Lung-RADS Version 1.1       Category 2, Benign appearance or behavior.       Management:  Continue annual lung screening with LDCT in 12 months.     9/29/21: Dexa scan normal.     12/8/22 CBC: WBC 9.1, Hemoglobin 11.0, Hematocrit 35.3, .4, Platelets 228, ANC 6200  CMP: BUN 47, Creatinine 2.7, GFR 24, ALT 8, AST 14, Alk Phos 123  PSA<0.01  Iron 64, TIBC 345, Sat 19, Ferritin 603  Vit B12 626.8, Folate 5.8  Sed Rate 86    3/2/23 CBC: WBC 8.5, Hemoglobin 11.5, Hematocrit 36.7, MCV 99.2, Platelets 218, ANC 6200  CMP: BUN 29, Creatinine 2.4, GFR 28, ALT 7, AST 14, Alk Phos 145  PSA<0.01  Iron 58, TIBC 335, Sat 17, Ferritin 642    PMH: CAD, HTN, HLP, DM, CKD    PSH: upper and lower endoscopy    SH: Lives alone. Three boys. Quit smoking long time ago but started smoking 1PP two and half days. Prior to quitting in 2014 he smoked 1-2 ppd for 40 years. Also h/O ETOH abuse. No other illicit drug abuse    FH: Whole family , his father had 10 sibling and all had cancer, does know what cancer. Has a brother and sister but no cancer diagnosis.      Interval History  3/9/2023:Arrived to clinic today alone using cane. Reports continued pain of Montrell legs. No CP. No Fevers. No ABD Pain. Reports Pain at Montrell Flank/Pelvis Weigh Stable..    Review of Systems   Per interval history; otherwise 10 point ROS is negative          Vital Signs:  /60 (Site: Right Upper Arm, Position: Sitting, Cuff Size: Medium Adult)   Pulse (!) 101   Temp 96.9 °F (36.1 °C) (Infrared)   Ht 6' 2\" (1.88 m)   Wt 225 lb (102.1 kg)   SpO2 98%   BMI 28.89 kg/m²     Physical Exam:    CONSTITUTIONAL: awake, alert, tired appearing  EYES: No palor or any icterus  ENT: ATNC  NECK: No JVD  HEMATOLOGIC/LYMPHATIC: no cervical, supraclavicular or axillary lymphadenopathy   LUNGS: CTAB  CARDIOVASCULAR: s1s2 rrr  ABDOMEN: normal bowel sounds x 4, soft, non-distended, non-tender, no masses palpated, no hepatosplenomegaly   NEUROLOGIC: GI  SKIN: No rash      Labs:    Hematology:  Lab Results   Component Value Date    WBC 8.5 03/02/2023    RBC 3.70 (L) 03/02/2023    HGB 11.5 (L) 03/02/2023    HCT 36.7 (L) 03/02/2023    MCV 99.2 03/02/2023    MCH 31.1 (H) 03/02/2023    MCHC 31.3 (L) 03/02/2023    RDW 12.6 03/02/2023     03/02/2023    MPV 9.2 03/02/2023    BANDSPCT 4 (L) 10/08/2017    SEGSPCT 73.3 (H) 03/02/2023    EOSRELPCT 2.8 03/02/2023    BASOPCT 0.2 03/02/2023    LYMPHOPCT 17.7 (L) 03/02/2023    MONOPCT 6.0 (H) 03/02/2023    BANDABS 0.64 10/08/2017    SEGSABS 6.2 03/02/2023    EOSABS 0.2 03/02/2023    BASOSABS 0.0 03/02/2023    LYMPHSABS 1.5 03/02/2023    MONOSABS 0.5 03/02/2023    DIFFTYPE AUTOMATED DIFFERENTIAL 03/02/2023    ANISOCYTOSIS 1+ 10/08/2017    POLYCHROM 1+ 10/08/2017     Lab Results   Component Value Date    ESR 86 (H) 12/08/2022       Chemistry:  Lab Results   Component Value Date     03/02/2023    K 4.9 03/02/2023     03/02/2023    CO2 26 03/02/2023    BUN 29 (H) 03/02/2023    CREATININE 2.4 (H) 03/02/2023    GLUCOSE 111 (H) 03/02/2023    CALCIUM 9.1 03/02/2023    PROT 7.2 03/02/2023    LABALBU 4.3 03/02/2023    BILITOT 0.5 03/02/2023    ALKPHOS 145 (H) 03/02/2023    AST 14 (L) 03/02/2023    ALT 7 (L) 03/02/2023    LABGLOM 28 (L) 03/02/2023    GFRAA 34 (A) 10/03/2022    AGRATIO 1.2 01/07/2019    GLOB 3.4 01/07/2019    PHOS 3.3 10/03/2022    MG 2.10 10/03/2022    POCGLU 109 (H) 01/15/2018     Lab Results   Component Value Date     06/28/2021     No components found for: LD  Lab Results   Component Value Date    TSHHS 1.980 01/14/2018       Immunology:  Lab Results   Component Value Date    PROT 7.2 03/02/2023    SPEP  08/31/2022     INTERPRETATION - Monoclonal gammopathy composed of free lambda light chains, measuring 400 mg/dL. LP.    SPEP  08/31/2022     INTERPRETATION - Monoclonal free lambda with a possible faint IgG heavy chain component. LP.    ALBUMINELP 3.4 08/31/2022    LABALPH 0.3 08/31/2022    LABALPH 0.8 08/31/2022    LABBETA 1.2 08/31/2022    GAMGLOB 1.4 08/31/2022     No results found for: Annamaria Hives, KLFLCR  No results found for: B2M    Coagulation Panel:  Lab Results   Component Value Date    PROTIME 12.9 (H) 02/20/2018    INR 1.13 02/20/2018    APTT 49.7 (H) 11/13/2017       Anemia Panel:  Lab Results   Component Value Date    CVAXPDGR06 626.8 12/08/2022    FOLATE 5.8 12/08/2022       Tumor Markers:  Lab Results   Component Value Date    PSA <0.1 03/02/2023         Imaging: Reviewed     Pathology:Reviewed     Observations:  Performance Status: ECOG 1  Depression Status: PHQ-9 Total Score: 0 (3/9/2023 10:43 AM)          Assessment & Plan:  66 yo male admitted for ARF and hyperkalemia found to Bilateral hydroureteronephrosis and pulmonary nodules and blastic lesions within the thoracic and lumbar spine With metastatic prostate cancer Found on biopsy of L5 with .3   -CT CAP feb 2018 shows osseous disease, MRI left hip October 2020 with possible osseous disease and left hip effusion. -NBS Jan 2021 with possible mets vs arthritis. Referred to radiation oncology, they did not recommend palliative XRT,  as they thought its probably sec to some other etiology.  Was also evaluated by ortho who did not recommend any surgical intervention.   -was on zytiga jan 18 till august 2018  -Switched to [place] in august 2018  -dec  2022 PSA <0.1  -March 2023 PSA less than 0.1  -cont enzalumatide  +Tamsulosine. Changed  GnRH analogue q6M and recheck psa every 3 M     Anemia/elevated ferritin: NC. Hb 11.5, Most probably contributed by AOKD, anemia of elderly. ? MGUS, will monitor. Note ferritin elevated, may have underlying early MDS with RS but very unlikely. No liver disease. Defers further evaluation at this point but to monitor. 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 -Changing analgesic regimen, adequate bowel regimen. CKD stage III, notified regarding rise in creatinine. Recommend that he follows with Dr Arturo Angel. Avoid nephrotoxic medication and adequate fluid intake. May need antihypertensive adjusted    EUGENE nodule: 4 mm EUGENE nodule in September 2021, recommend repeat now    Continue other medical care    Discussed the above findings and plan with him and he verbalized understanding    Recommend follow up with PCP and other specialists    Discussed healthy LS, helathy diet and recommend being uptodate with age appropriate screening tool. RTC Sept 2023 or earlier if new sx    Scribe Authentication Statement  Zion Goff scribed portions of this documentation for and in the presence of Richard Mccoy MD on 3/9/23 at 8:59 AM EST. Provider Annamaria Taylor M.D., personally performed the services described in this documentation and they were scribed in my presence by Zion Goff MA. The documentation is both accurate and complete.      MD Dina Lux

## 2023-03-09 NOTE — PROGRESS NOTES
MA Rooming Questions  Patient: Reba Escobar  MRN: [id number]    Date: 3/9/2023        1. Do you have any new issues?   no         2. Do you need any refills on medications?    no    3. Have you had any imaging done since your last visit?   no    4. Have you been hospitalized or seen in the emergency room since your last visit here?   no    5. Did the patient have a depression screening completed today?  Yes    PHQ-9 Total Score: 0 (3/9/2023 10:43 AM)       PHQ-9 Given to (if applicable):               PHQ-9 Score (if applicable):                     [] Positive     [x]  Negative              Does question #9 need addressed (if applicable)                     [] Yes    []  No               Rosalie Bardales CMA

## 2023-03-15 DIAGNOSIS — T38.7X5A OSTEOPOROSIS DUE TO ANDROGEN THERAPY: ICD-10-CM

## 2023-03-15 DIAGNOSIS — C79.51 SECONDARY MALIGNANT NEOPLASM OF BONE AND BONE MARROW (HCC): ICD-10-CM

## 2023-03-15 DIAGNOSIS — C79.52 SECONDARY MALIGNANT NEOPLASM OF BONE AND BONE MARROW (HCC): ICD-10-CM

## 2023-03-15 DIAGNOSIS — M81.8 OSTEOPOROSIS DUE TO ANDROGEN THERAPY: ICD-10-CM

## 2023-03-15 DIAGNOSIS — M84.50XA PATHOLOGICAL FRACTURE DUE TO METASTATIC BONE DISEASE: Primary | ICD-10-CM

## 2023-08-03 ENCOUNTER — CLINICAL DOCUMENTATION (OUTPATIENT)
Dept: ONCOLOGY | Age: 73
End: 2023-08-03

## 2023-08-03 DIAGNOSIS — C61 PROSTATE CANCER (HCC): Primary | ICD-10-CM

## 2023-08-03 RX ORDER — OXYCODONE AND ACETAMINOPHEN 7.5; 325 MG/1; MG/1
1 TABLET ORAL 2 TIMES DAILY PRN
Qty: 60 TABLET | Refills: 0 | Status: SHIPPED | OUTPATIENT
Start: 2023-08-03 | End: 2023-09-02

## 2023-08-03 NOTE — PROGRESS NOTES
Spoke with patient regarding pain medication. Order pended for percocet 7.5-325 mg to physician. Patient does not need medication every day and has been instructed by physician to take 1.5 tablets PRN. Patient inquiring about possible injections to help with pain. Patient c/o pain in left hip radiating to knee. Patient should follow up with ortho per physician. Attempted to call patient back @ [phone] to update; however, call busy.

## 2023-09-06 ENCOUNTER — HOSPITAL ENCOUNTER (OUTPATIENT)
Dept: INFUSION THERAPY | Age: 73
Discharge: HOME | End: 2023-09-06
Payer: COMMERCIAL

## 2023-09-06 DIAGNOSIS — C79.52 SECONDARY MALIGNANT NEOPLASM OF BONE AND BONE MARROW (HCC): ICD-10-CM

## 2023-09-06 DIAGNOSIS — C79.51 SECONDARY MALIGNANT NEOPLASM OF BONE AND BONE MARROW (HCC): ICD-10-CM

## 2023-09-06 DIAGNOSIS — C61 PROSTATE CANCER (HCC): Primary | ICD-10-CM

## 2023-09-06 LAB
ALBUMIN SERPL-MCNC: 4.3 GM/DL (ref 3.4–5)
ALP BLD-CCNC: 141 IU/L (ref 40–129)
ALT SERPL-CCNC: 6 U/L (ref 10–40)
ANION GAP SERPL CALCULATED.3IONS-SCNC: 13 MMOL/L (ref 4–16)
AST SERPL-CCNC: 12 IU/L (ref 15–37)
BASOPHILS ABSOLUTE: 0 K/CU MM
BASOPHILS RELATIVE PERCENT: 0.1 % (ref 0–1)
BILIRUB SERPL-MCNC: 0.5 MG/DL (ref 0–1)
BUN SERPL-MCNC: 36 MG/DL (ref 6–23)
CALCIUM SERPL-MCNC: 9 MG/DL (ref 8.3–10.6)
CHLORIDE BLD-SCNC: 105 MMOL/L (ref 99–110)
CO2: 23 MMOL/L (ref 21–32)
CREAT SERPL-MCNC: 2.8 MG/DL (ref 0.9–1.3)
DIFFERENTIAL TYPE: ABNORMAL
EOSINOPHILS ABSOLUTE: 0.2 K/CU MM
EOSINOPHILS RELATIVE PERCENT: 2.5 % (ref 0–3)
FERRITIN: 382 NG/ML (ref 30–400)
GFR SERPL CREATININE-BSD FRML MDRD: 23 ML/MIN/1.73M2
GLUCOSE SERPL-MCNC: 187 MG/DL (ref 70–99)
HCT VFR BLD CALC: 35.3 % (ref 42–52)
HEMOGLOBIN: 11 GM/DL (ref 13.5–18)
IRON: 63 UG/DL (ref 59–158)
LYMPHOCYTES ABSOLUTE: 1.4 K/CU MM
LYMPHOCYTES RELATIVE PERCENT: 16.8 % (ref 24–44)
MCH RBC QN AUTO: 30.9 PG (ref 27–31)
MCHC RBC AUTO-ENTMCNC: 31.2 % (ref 32–36)
MCV RBC AUTO: 99.2 FL (ref 78–100)
MONOCYTES ABSOLUTE: 0.5 K/CU MM
MONOCYTES RELATIVE PERCENT: 5.8 % (ref 0–4)
PCT TRANSFERRIN: 20 % (ref 10–44)
PDW BLD-RTO: 12.7 % (ref 11.7–14.9)
PLATELET # BLD: 216 K/CU MM (ref 140–440)
PMV BLD AUTO: 9.1 FL (ref 7.5–11.1)
POTASSIUM SERPL-SCNC: 4.2 MMOL/L (ref 3.5–5.1)
RBC # BLD: 3.56 M/CU MM (ref 4.6–6.2)
SEGMENTED NEUTROPHILS ABSOLUTE COUNT: 6.2 K/CU MM
SEGMENTED NEUTROPHILS RELATIVE PERCENT: 74.8 % (ref 36–66)
SODIUM BLD-SCNC: 141 MMOL/L (ref 135–145)
TOTAL IRON BINDING CAPACITY: 323 UG/DL (ref 250–450)
TOTAL PROTEIN: 6.9 GM/DL (ref 6.4–8.2)
UNSATURATED IRON BINDING CAPACITY: 260 UG/DL (ref 110–370)
WBC # BLD: 8.3 K/CU MM (ref 4–10.5)

## 2023-09-06 PROCEDURE — 85025 COMPLETE CBC W/AUTO DIFF WBC: CPT

## 2023-09-06 PROCEDURE — 96402 CHEMO HORMON ANTINEOPL SQ/IM: CPT

## 2023-09-06 PROCEDURE — 82728 ASSAY OF FERRITIN: CPT

## 2023-09-06 PROCEDURE — 6360000002 HC RX W HCPCS: Performed by: INTERNAL MEDICINE

## 2023-09-06 PROCEDURE — 84154 ASSAY OF PSA FREE: CPT

## 2023-09-06 PROCEDURE — 83550 IRON BINDING TEST: CPT

## 2023-09-06 PROCEDURE — 83540 ASSAY OF IRON: CPT

## 2023-09-06 PROCEDURE — 84153 ASSAY OF PSA TOTAL: CPT

## 2023-09-06 PROCEDURE — 80053 COMPREHEN METABOLIC PANEL: CPT

## 2023-09-06 RX ADMIN — LEUPROLIDE ACETATE 45 MG: KIT SUBCUTANEOUS at 09:31

## 2023-09-06 NOTE — PROGRESS NOTES
Patient arrived to treatment  suite for Eligard injection. No questions or concerns for the doctor at this time. Labs obtained as ordered. Treatment approved and given subcutaneously in right abdomen, band-aid applied. Patient tolerated well. Left treatment suite ambulatory. Discharge instructions provided.   Collette Smith RN

## 2023-09-21 ENCOUNTER — OFFICE VISIT (OUTPATIENT)
Dept: ONCOLOGY | Age: 73
End: 2023-09-21
Payer: COMMERCIAL

## 2023-09-21 ENCOUNTER — HOSPITAL ENCOUNTER (OUTPATIENT)
Dept: INFUSION THERAPY | Age: 73
Discharge: HOME | End: 2023-09-21
Payer: COMMERCIAL

## 2023-09-21 VITALS
DIASTOLIC BLOOD PRESSURE: 73 MMHG | HEIGHT: 74 IN | TEMPERATURE: 97.2 F | WEIGHT: 226 LBS | BODY MASS INDEX: 29 KG/M2 | RESPIRATION RATE: 20 BRPM | SYSTOLIC BLOOD PRESSURE: 142 MMHG | HEART RATE: 97 BPM | OXYGEN SATURATION: 97 %

## 2023-09-21 DIAGNOSIS — C79.52 SECONDARY MALIGNANT NEOPLASM OF BONE AND BONE MARROW (HCC): ICD-10-CM

## 2023-09-21 DIAGNOSIS — C79.51 SECONDARY MALIGNANT NEOPLASM OF BONE AND BONE MARROW (HCC): ICD-10-CM

## 2023-09-21 DIAGNOSIS — D64.9 ANEMIA, UNSPECIFIED TYPE: ICD-10-CM

## 2023-09-21 DIAGNOSIS — C61 PROSTATE CANCER (HCC): Primary | ICD-10-CM

## 2023-09-21 PROCEDURE — 99211 OFF/OP EST MAY X REQ PHY/QHP: CPT

## 2023-09-21 PROCEDURE — 3077F SYST BP >= 140 MM HG: CPT | Performed by: INTERNAL MEDICINE

## 2023-09-21 PROCEDURE — 1123F ACP DISCUSS/DSCN MKR DOCD: CPT | Performed by: INTERNAL MEDICINE

## 2023-09-21 PROCEDURE — 3078F DIAST BP <80 MM HG: CPT | Performed by: INTERNAL MEDICINE

## 2023-09-21 PROCEDURE — 99214 OFFICE O/P EST MOD 30 MIN: CPT | Performed by: INTERNAL MEDICINE

## 2023-09-21 NOTE — PROGRESS NOTES
Patient Name: Hedy Gonzalez  Patient : 1950  Patient MRN: [id number]     Primary Oncologist: Stas Reynolds MD  Referring Physician: Nestor Pearson MD, [facility]   Urologist: Dr Nicole Hayes       Date of Service: 2023      Chief Complaint:   Chief Complaint   Patient presents with    Follow-up          Active Problem list  1. Prostate cancer ([address])    2. 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 Anemia, unspecified type             HPI:        Elderly  male admitted for abnormal labs found to be in a acute renal failure with hyperkalemia 2017 With a creatinine of 7.5 CT AP shows evidence of metastatic disease in the abdomen and lungs. A vazquez was placed and he has been hydrated. Renal and urology are on board. Has some right shoulder pain. Lost 20 lbs in the past few months. ONC history  Hx of prostate cancer s/p XRT  Treated at [facility]  October 10, 2017 showed multiple pleural-based lung nodules of bilateral lung bases with the largest measuring 1.2 cm of posterior right lung base new since 2014. 2017 admission for hyperkalemia acute renal failure, CT head negative for intracranial abnormality. CT abdomen pelvis showed multiple lung base pulmonary nodules. Blastic lesions within the visualized thoracic and lumbar spine. Findings concerning for metastatic disease. Progressive bilateral hydroureteronephrosis without evidence for obstructing calculus  November 10, 2017 bone scan showed multifocal axial and appendicular skeletal disease. Casodex was started.  PSA was 112.3  2017 Bone biopsy showed metastatic adenocarcinoma the prostate Based on L5 bone biopsy   saw DR Marnie Benavides)   lupron started  dec 2017 s/p EGD essentially normal  dec 13,2017 started cycle #1 docetaxel, received 3C until  Admitted for hyperkalemia, underwent a temporary hemodialysis, PSA was 3  Then placed on

## 2023-09-21 NOTE — PROGRESS NOTES
MA Rooming Questions  Patient: Geoff Ram  MRN: [id number]    Date: 9/21/2023        1. Do you have any new issues?   no         2. Do you need any refills on medications?    no    3. Have you had any imaging done since your last visit?   no    4. Have you been hospitalized or seen in the emergency room since your last visit here?   no    5. Did the patient have a depression screening completed today?  No    No data recorded     PHQ-9 Given to (if applicable):               PHQ-9 Score (if applicable):                     [] Positive     []  Negative              Does question #9 need addressed (if applicable)                     [] Yes    []  No               Maia Witt MA

## 2023-10-11 ENCOUNTER — HOSPITAL ENCOUNTER (OUTPATIENT)
Age: 73
Discharge: HOME | End: 2023-10-11
Payer: COMMERCIAL

## 2023-10-11 DIAGNOSIS — E11.9 TYPE 2 DIABETES MELLITUS WITHOUT COMPLICATION, WITH LONG-TERM CURRENT USE OF INSULIN (HCC): Chronic | ICD-10-CM

## 2023-10-11 DIAGNOSIS — C61 PROSTATE CANCER (HCC): ICD-10-CM

## 2023-10-11 DIAGNOSIS — E87.5 HYPERKALEMIA: ICD-10-CM

## 2023-10-11 DIAGNOSIS — Z79.4 TYPE 2 DIABETES MELLITUS WITHOUT COMPLICATION, WITH LONG-TERM CURRENT USE OF INSULIN (HCC): Chronic | ICD-10-CM

## 2023-10-11 DIAGNOSIS — I25.10 CORONARY ARTERY DISEASE INVOLVING NATIVE HEART WITHOUT ANGINA PECTORIS, UNSPECIFIED VESSEL OR LESION TYPE: ICD-10-CM

## 2023-10-11 DIAGNOSIS — N18.4 CHRONIC KIDNEY DISEASE, STAGE IV (SEVERE) (HCC): ICD-10-CM

## 2023-10-11 LAB
ALBUMIN SERPL-MCNC: 4.1 GM/DL (ref 3.4–5)
ANION GAP SERPL CALCULATED.3IONS-SCNC: 9 MMOL/L (ref 4–16)
BACTERIA: ABNORMAL /HPF
BILIRUBIN URINE: NEGATIVE MG/DL
BLOOD, URINE: ABNORMAL
BUN SERPL-MCNC: 52 MG/DL (ref 6–23)
CALCIUM SERPL-MCNC: 9.1 MG/DL (ref 8.3–10.6)
CHLORIDE BLD-SCNC: 107 MMOL/L (ref 99–110)
CLARITY: ABNORMAL
CO2: 25 MMOL/L (ref 21–32)
COLOR: YELLOW
CREAT SERPL-MCNC: 2.6 MG/DL (ref 0.9–1.3)
CREATININE URINE: 72.9 MG/DL (ref 39–259)
GFR SERPL CREATININE-BSD FRML MDRD: 25 ML/MIN/1.73M2
GLUCOSE SERPL-MCNC: 118 MG/DL (ref 70–99)
GLUCOSE, URINE: NEGATIVE MG/DL
KETONES, URINE: NEGATIVE MG/DL
LEUKOCYTE ESTERASE, URINE: ABNORMAL
MAGNESIUM: 2.2 MG/DL (ref 1.8–2.4)
MUCUS: ABNORMAL HPF
NITRITE URINE, QUANTITATIVE: POSITIVE
PH, URINE: 5.5 (ref 5–8)
PHOSPHORUS: 3.4 MG/DL (ref 2.5–4.9)
POTASSIUM SERPL-SCNC: 4.2 MMOL/L (ref 3.5–5.1)
PROT/CREAT RATIO, UR: 0.5
PROTEIN UA: ABNORMAL MG/DL
RBC URINE: 14 /HPF (ref 0–3)
SODIUM BLD-SCNC: 141 MMOL/L (ref 135–145)
SPECIFIC GRAVITY UA: 1.01 (ref 1–1.03)
SQUAMOUS EPITHELIAL: 1 /HPF
TRICHOMONAS: ABNORMAL /HPF
URINE TOTAL PROTEIN: 39.1 MG/DL
UROBILINOGEN, URINE: 0.2 MG/DL (ref 0.2–1)
WBC CLUMP: ABNORMAL /HPF
WBC UA: 24 /HPF (ref 0–2)

## 2023-10-11 PROCEDURE — 84100 ASSAY OF PHOSPHORUS: CPT

## 2023-10-11 PROCEDURE — 82040 ASSAY OF SERUM ALBUMIN: CPT

## 2023-10-11 PROCEDURE — 84156 ASSAY OF PROTEIN URINE: CPT

## 2023-10-11 PROCEDURE — 36415 COLL VENOUS BLD VENIPUNCTURE: CPT

## 2023-10-11 PROCEDURE — 82570 ASSAY OF URINE CREATININE: CPT

## 2023-10-11 PROCEDURE — 80048 BASIC METABOLIC PNL TOTAL CA: CPT

## 2023-10-11 PROCEDURE — 81001 URINALYSIS AUTO W/SCOPE: CPT

## 2023-10-11 PROCEDURE — 83735 ASSAY OF MAGNESIUM: CPT

## 2023-10-12 DIAGNOSIS — C61 PROSTATE CANCER (HCC): ICD-10-CM

## 2023-10-12 RX ORDER — ENZALUTAMIDE 80 MG/1
160 TABLET ORAL DAILY
Qty: 60 TABLET | Refills: 5 | Status: ACTIVE | OUTPATIENT
Start: 2023-10-12

## 2023-10-12 NOTE — PROGRESS NOTES
New RX for xtandi sent to [facility] per request from [facility] for Free drug.

## 2023-10-30 RX ORDER — TAMSULOSIN HYDROCHLORIDE 0.4 MG/1
0.4 CAPSULE ORAL DAILY
Qty: 90 CAPSULE | Refills: 5 | Status: SHIPPED | OUTPATIENT
Start: 2023-10-30 | End: 2023-11-29

## 2023-11-29 DIAGNOSIS — C61 PROSTATE CANCER (HCC): Primary | ICD-10-CM

## 2023-11-29 RX ORDER — OXYCODONE AND ACETAMINOPHEN 7.5; 325 MG/1; MG/1
1 TABLET ORAL 2 TIMES DAILY
Qty: 60 TABLET | Refills: 0 | Status: SHIPPED | OUTPATIENT
Start: 2023-11-29 | End: 2023-12-29

## 2023-11-29 NOTE — TELEPHONE ENCOUNTER
Patient contacted our office in need of refill for oxcodone. Patient has a scheduled appointment on 03/14/24. Patients preferred pharmacy is [facility] [address]. Pending for patients chart provider [address] [facility] [address].

## 2023-11-30 ENCOUNTER — CLINICAL DOCUMENTATION (OUTPATIENT)
Facility: HOSPITAL | Age: 73
End: 2023-11-30

## 2023-11-30 NOTE — PROGRESS NOTES
Patient Assistance    Met with: Vasiliy Osullivanator Type: Oral  Documentation Type: Assistance Review  Contact Type: Telephone  Navigation Status: Follow-up  Status of Patient Insurance Coverage: Patient has active coverage          Additional notes: The patient has been approved for Free Xtandi provider by [employer] for 2024. I informed the patient of the approval.     Drug Name: OTHER  Other Drug Name: Xtandi  Form of PAP Assistance: Free Drug

## 2024-01-09 ENCOUNTER — TELEPHONE (OUTPATIENT)
Dept: ONCOLOGY | Age: 74
End: 2024-01-09

## 2024-01-09 NOTE — TELEPHONE ENCOUNTER
Pt requests a prescription for a walker. Pt states he has left leg pain and weakness and has fallen twice recently and that his cane is no longer sufficient. Pt states that his insurance will cover the type of walker with 2 wheels in the front and legs in the back.

## 2024-01-10 NOTE — TELEPHONE ENCOUNTER
DME RX for FWW, latest progress note, copy of insurance card, and demographics facesheet faxed successfully to [facility] @ [phone]

## 2024-03-07 ENCOUNTER — HOSPITAL ENCOUNTER (OUTPATIENT)
Dept: INFUSION THERAPY | Age: 74
Discharge: HOME | End: 2024-03-07
Payer: COMMERCIAL

## 2024-03-07 DIAGNOSIS — D64.9 ANEMIA, UNSPECIFIED TYPE: ICD-10-CM

## 2024-03-07 DIAGNOSIS — C61 PROSTATE CANCER (HCC): Primary | ICD-10-CM

## 2024-03-07 DIAGNOSIS — C79.51 SECONDARY MALIGNANT NEOPLASM OF BONE AND BONE MARROW (HCC): ICD-10-CM

## 2024-03-07 DIAGNOSIS — C79.52 SECONDARY MALIGNANT NEOPLASM OF BONE AND BONE MARROW (HCC): ICD-10-CM

## 2024-03-07 LAB
ALBUMIN SERPL-MCNC: 4 GM/DL (ref 3.4–5)
ALP BLD-CCNC: 144 IU/L (ref 40–128)
ALT SERPL-CCNC: 5 U/L (ref 10–40)
ANION GAP SERPL CALCULATED.3IONS-SCNC: 8 MMOL/L (ref 7–16)
AST SERPL-CCNC: 13 IU/L (ref 15–37)
BASOPHILS ABSOLUTE: 0 K/CU MM
BASOPHILS RELATIVE PERCENT: 0.2 % (ref 0–1)
BILIRUB SERPL-MCNC: 0.4 MG/DL (ref 0–1)
BUN SERPL-MCNC: 32 MG/DL (ref 6–23)
CALCIUM SERPL-MCNC: 8.7 MG/DL (ref 8.3–10.6)
CHLORIDE BLD-SCNC: 107 MMOL/L (ref 99–110)
CO2: 25 MMOL/L (ref 21–32)
CREAT SERPL-MCNC: 2.6 MG/DL (ref 0.9–1.3)
DIFFERENTIAL TYPE: ABNORMAL
EOSINOPHILS ABSOLUTE: 0.2 K/CU MM
EOSINOPHILS RELATIVE PERCENT: 2 % (ref 0–3)
FERRITIN: 295 NG/ML (ref 30–400)
GFR SERPL CREATININE-BSD FRML MDRD: 25 ML/MIN/1.73M2
GLUCOSE SERPL-MCNC: 105 MG/DL (ref 70–99)
HCT VFR BLD CALC: 33.8 % (ref 42–52)
HEMOGLOBIN: 10.7 GM/DL (ref 13.5–18)
IRON: 57 UG/DL (ref 59–158)
LYMPHOCYTES ABSOLUTE: 1.7 K/CU MM
LYMPHOCYTES RELATIVE PERCENT: 18.3 % (ref 24–44)
MCH RBC QN AUTO: 32.1 PG (ref 27–31)
MCHC RBC AUTO-ENTMCNC: 31.7 % (ref 32–36)
MCV RBC AUTO: 101.5 FL (ref 78–100)
MONOCYTES ABSOLUTE: 0.7 K/CU MM
MONOCYTES RELATIVE PERCENT: 7.2 % (ref 0–4)
PCT TRANSFERRIN: 16 % (ref 10–44)
PDW BLD-RTO: 12.6 % (ref 11.7–14.9)
PLATELET # BLD: 230 K/CU MM (ref 140–440)
PMV BLD AUTO: 8.6 FL (ref 7.5–11.1)
POTASSIUM SERPL-SCNC: 4.8 MMOL/L (ref 3.5–5.1)
RBC # BLD: 3.33 M/CU MM (ref 4.6–6.2)
SEGMENTED NEUTROPHILS ABSOLUTE COUNT: 6.6 K/CU MM
SEGMENTED NEUTROPHILS RELATIVE PERCENT: 72.3 % (ref 36–66)
SODIUM BLD-SCNC: 140 MMOL/L (ref 135–145)
TOTAL IRON BINDING CAPACITY: 349 UG/DL (ref 250–450)
TOTAL PROTEIN: 6.9 GM/DL (ref 6.4–8.2)
UNSATURATED IRON BINDING CAPACITY: 292 UG/DL (ref 110–370)
WBC # BLD: 9.1 K/CU MM (ref 4–10.5)

## 2024-03-07 PROCEDURE — 83550 IRON BINDING TEST: CPT

## 2024-03-07 PROCEDURE — 85025 COMPLETE CBC W/AUTO DIFF WBC: CPT

## 2024-03-07 PROCEDURE — 83540 ASSAY OF IRON: CPT

## 2024-03-07 PROCEDURE — 80053 COMPREHEN METABOLIC PANEL: CPT

## 2024-03-07 PROCEDURE — 82728 ASSAY OF FERRITIN: CPT

## 2024-03-07 PROCEDURE — 6360000002 HC RX W HCPCS: Performed by: INTERNAL MEDICINE

## 2024-03-07 PROCEDURE — 84154 ASSAY OF PSA FREE: CPT

## 2024-03-07 PROCEDURE — 96402 CHEMO HORMON ANTINEOPL SQ/IM: CPT

## 2024-03-07 PROCEDURE — 36415 COLL VENOUS BLD VENIPUNCTURE: CPT

## 2024-03-07 PROCEDURE — 84153 ASSAY OF PSA TOTAL: CPT

## 2024-03-07 RX ADMIN — LEUPROLIDE ACETATE 45 MG: KIT SUBCUTANEOUS at 15:38

## 2024-03-07 NOTE — PROGRESS NOTES
Patient ambulated to infusion area, here today for a Eligard injection. No concerns at this time. Treatment approved and given in [facility]. Patient tolerated well. Patient provided discharge instructions. RTC 03/14 for OV with Dr. Krishnan.

## 2024-03-14 ENCOUNTER — OFFICE VISIT (OUTPATIENT)
Dept: ONCOLOGY | Age: 74
End: 2024-03-14
Payer: COMMERCIAL

## 2024-03-14 ENCOUNTER — HOSPITAL ENCOUNTER (OUTPATIENT)
Dept: INFUSION THERAPY | Age: 74
Discharge: HOME | End: 2024-03-14
Payer: COMMERCIAL

## 2024-03-14 ENCOUNTER — CLINICAL DOCUMENTATION (OUTPATIENT)
Dept: ONCOLOGY | Age: 74
End: 2024-03-14

## 2024-03-14 VITALS
HEART RATE: 93 BPM | BODY MASS INDEX: 28.36 KG/M2 | TEMPERATURE: 97.7 F | DIASTOLIC BLOOD PRESSURE: 72 MMHG | HEIGHT: 74 IN | WEIGHT: 221 LBS | OXYGEN SATURATION: 98 % | SYSTOLIC BLOOD PRESSURE: 130 MMHG

## 2024-03-14 DIAGNOSIS — C79.51 SECONDARY MALIGNANT NEOPLASM OF BONE AND BONE MARROW (HCC): ICD-10-CM

## 2024-03-14 DIAGNOSIS — C79.52 SECONDARY MALIGNANT NEOPLASM OF BONE AND BONE MARROW (HCC): ICD-10-CM

## 2024-03-14 DIAGNOSIS — C61 PROSTATE CANCER (HCC): ICD-10-CM

## 2024-03-14 PROCEDURE — 99214 OFFICE O/P EST MOD 30 MIN: CPT | Performed by: INTERNAL MEDICINE

## 2024-03-14 PROCEDURE — 3075F SYST BP GE 130 - 139MM HG: CPT | Performed by: INTERNAL MEDICINE

## 2024-03-14 PROCEDURE — 1123F ACP DISCUSS/DSCN MKR DOCD: CPT | Performed by: INTERNAL MEDICINE

## 2024-03-14 PROCEDURE — 3078F DIAST BP <80 MM HG: CPT | Performed by: INTERNAL MEDICINE

## 2024-03-14 PROCEDURE — 99211 OFF/OP EST MAY X REQ PHY/QHP: CPT

## 2024-03-14 RX ORDER — OXYCODONE HCL 10 MG/1
10 TABLET, FILM COATED, EXTENDED RELEASE ORAL DAILY
Qty: 30 TABLET | Refills: 0 | Status: SHIPPED | OUTPATIENT
Start: 2024-03-14 | End: 2024-04-13

## 2024-03-14 RX ORDER — TAMSULOSIN HYDROCHLORIDE 0.4 MG/1
0.4 CAPSULE ORAL DAILY
Qty: 90 CAPSULE | Refills: 5 | Status: SHIPPED | OUTPATIENT
Start: 2024-03-14 | End: 2025-09-05

## 2024-03-14 RX ORDER — OXYCODONE AND ACETAMINOPHEN 7.5; 325 MG/1; MG/1
1 TABLET ORAL 2 TIMES DAILY PRN
Qty: 60 TABLET | Refills: 0 | Status: CANCELLED | OUTPATIENT
Start: 2024-03-14 | End: 2024-04-13

## 2024-03-14 ASSESSMENT — PATIENT HEALTH QUESTIONNAIRE - PHQ9
SUM OF ALL RESPONSES TO PHQ QUESTIONS 1-9: 0
1. LITTLE INTEREST OR PLEASURE IN DOING THINGS: 0
SUM OF ALL RESPONSES TO PHQ QUESTIONS 1-9: 0
SUM OF ALL RESPONSES TO PHQ9 QUESTIONS 1 & 2: 0
SUM OF ALL RESPONSES TO PHQ QUESTIONS 1-9: 0
SUM OF ALL RESPONSES TO PHQ QUESTIONS 1-9: 0
2. FEELING DOWN, DEPRESSED OR HOPELESS: 0

## 2024-03-14 NOTE — PROGRESS NOTES
MA Rooming Questions  Patient: Vasiliy Mosley  MRN: [id number]    Date: 3/14/2024        1. Do you have any new issues?   yes - Patient has fell 5 times in the last month          2. Do you need any refills on medications?    yes -Flomax and Percocet     3. Have you had any imaging done since your last visit?   no    4. Have you been hospitalized or seen in the emergency room since your last visit here?   no    5. Did the patient have a depression screening completed today? Yes    PHQ-9 Total Score: 0 (3/14/2024 10:19 AM)       PHQ-9 Given to (if applicable):               PHQ-9 Score (if applicable):                     [] Positive     []  Negative              Does question #9 need addressed (if applicable)                     [] Yes    []  No               Niya Tam MA

## 2024-03-14 NOTE — PROGRESS NOTES
Patient Name: Vasiliy oMsley  Patient : 1950  Patient MRN: [id number]     Primary Oncologist: Cordelia Krishnan MD  Referring Physician: JESUS ALBERTO MAXWELL MD, LIZABETH STEINER   Urologist: Dr Henriquez       Date of Service: 3/14/2024      Chief Complaint:   Chief Complaint   Patient presents with    Follow-up          Active Problem list  1. Prostate cancer (HCC)    2. 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 A vazquez was placed and he has been hydrated. Renal and urology are on board. Has some right shoulder pain. Lost 20 lbs in the past few months.     ONC history  Hx of prostate cancer s/p XRT  Treated at [facility]  October 10, 2017 showed multiple pleural-based lung nodules of bilateral lung bases with the largest measuring 1.2 cm of posterior right lung base new since 2014.  2017 admission for hyperkalemia acute renal failure, CT head negative for intracranial abnormality. CT abdomen pelvis showed multiple lung base pulmonary nodules. Blastic lesions within the visualized thoracic and lumbar spine. Findings concerning for metastatic disease. Progressive bilateral hydroureteronephrosis without evidence for obstructing calculus  November 10, 2017 bone scan showed multifocal axial and appendicular skeletal disease. Casodex was started. PSA was 112.3  2017 Bone biopsy showed metastatic adenocarcinoma the prostate Based on L5 bone biopsy   saw DR Gauthier(nephro)   lupron started  dec 2017 s/p EGD essentially normal  dec 13,2017 started cycle #1 docetaxel, received 3C until  Admitted for hyperkalemia, underwent a temporary hemodialysis, PSA was 3  Then placed on Zytiga until b

## 2024-03-25 ENCOUNTER — CLINICAL DOCUMENTATION (OUTPATIENT)
Dept: ONCOLOGY | Age: 74
End: 2024-03-25

## 2024-03-29 ENCOUNTER — CLINICAL DOCUMENTATION (OUTPATIENT)
Dept: ONCOLOGY | Age: 74
End: 2024-03-29

## 2024-03-29 DIAGNOSIS — C61 PROSTATE CANCER (HCC): ICD-10-CM

## 2024-03-29 RX ORDER — OXYCODONE HYDROCHLORIDE 10 MG/1
10 TABLET, FILM COATED, EXTENDED RELEASE ORAL DAILY
Qty: 30 TABLET | Refills: 0 | Status: SHIPPED | OUTPATIENT
Start: 2024-03-29 | End: 2024-04-28

## 2024-03-29 NOTE — TELEPHONE ENCOUNTER
Pt was unable to  original RX for Oxycontin 10 mg due to pharmacy not having in stock and not being able to fill the script. , Per pharmacy need to order the brand name because they are not able to order generic.  Spoke with pharmacy and script  on 3/28/24.  Pending a new script to provider to send to [facility] [address]

## 2024-03-29 NOTE — PROGRESS NOTES
Received VM from [facility] stating that RX for oxycodone 10 mg ER has  and new RX is needed. Advised that the  no longer carries generic and order would need placed for brand. Will notify physician upon return 2024 on how to proceed with ordering pain medication.

## 2024-04-01 DIAGNOSIS — C79.51 SECONDARY MALIGNANT NEOPLASM OF BONE AND BONE MARROW (HCC): Primary | ICD-10-CM

## 2024-04-01 DIAGNOSIS — C79.52 SECONDARY MALIGNANT NEOPLASM OF BONE AND BONE MARROW (HCC): Primary | ICD-10-CM

## 2024-04-01 DIAGNOSIS — C61 PROSTATE CANCER (HCC): ICD-10-CM

## 2024-04-01 DIAGNOSIS — C61 PROSTATE CANCER (HCC): Primary | ICD-10-CM

## 2024-04-01 RX ORDER — OXYCODONE HYDROCHLORIDE 10 MG/1
10 TABLET ORAL DAILY PRN
Qty: 30 TABLET | Refills: 0 | Status: SHIPPED | OUTPATIENT
Start: 2024-04-01 | End: 2024-05-01

## 2024-04-01 NOTE — TELEPHONE ENCOUNTER
Physician aware that new RX is needed for pain medication. Patient had previously been taking percocet 7.5-325 mg for pain. Patient was taking 1.5 tablets, so physician changed RX for oxycodone 10 mg ER daily. RX was e-scribed on 03/14/2024; however, patient never picked up pain medication. Covering physician reordered oxycodone 10 mg ER on 03/29/2024. PA was initiated, but received correspondence from pharmacy stating that extended release is not covered. This RN called [facility] pharmacy @ [phone] and [facility] confirmed that ER is not covered, but IR is covered. New RX for oxycodone 10 mg IR daily #30 pended for physician as instructed.

## 2024-04-03 ENCOUNTER — HOSPITAL ENCOUNTER (OUTPATIENT)
Age: 74
Discharge: HOME | End: 2024-04-03
Payer: COMMERCIAL

## 2024-04-03 DIAGNOSIS — N18.4 CHRONIC KIDNEY DISEASE, STAGE IV (SEVERE) (HCC): ICD-10-CM

## 2024-04-03 DIAGNOSIS — I10 ESSENTIAL HYPERTENSION, BENIGN: Chronic | ICD-10-CM

## 2024-04-03 DIAGNOSIS — I25.10 CORONARY ARTERY DISEASE INVOLVING NATIVE HEART WITHOUT ANGINA PECTORIS, UNSPECIFIED VESSEL OR LESION TYPE: ICD-10-CM

## 2024-04-03 DIAGNOSIS — Z79.4 TYPE 2 DIABETES MELLITUS WITHOUT COMPLICATION, WITH LONG-TERM CURRENT USE OF INSULIN (HCC): Chronic | ICD-10-CM

## 2024-04-03 DIAGNOSIS — E87.5 HYPERKALEMIA: ICD-10-CM

## 2024-04-03 DIAGNOSIS — C61 PROSTATE CANCER (HCC): ICD-10-CM

## 2024-04-03 DIAGNOSIS — E11.9 TYPE 2 DIABETES MELLITUS WITHOUT COMPLICATION, WITH LONG-TERM CURRENT USE OF INSULIN (HCC): Chronic | ICD-10-CM

## 2024-04-03 LAB
ALBUMIN SERPL-MCNC: 4 GM/DL (ref 3.4–5)
ALBUMIN SERPL-MCNC: 4.1 GM/DL (ref 3.4–5)
ALP BLD-CCNC: 130 IU/L (ref 40–128)
ALT SERPL-CCNC: 6 U/L (ref 10–40)
ANION GAP SERPL CALCULATED.3IONS-SCNC: 12 MMOL/L (ref 7–16)
ANION GAP SERPL CALCULATED.3IONS-SCNC: 13 MMOL/L (ref 7–16)
AST SERPL-CCNC: 13 IU/L (ref 15–37)
BACTERIA: ABNORMAL /HPF
BILIRUB SERPL-MCNC: 0.4 MG/DL (ref 0–1)
BILIRUBIN URINE: NEGATIVE MG/DL
BLOOD, URINE: NEGATIVE
BUN SERPL-MCNC: 34 MG/DL (ref 6–23)
BUN SERPL-MCNC: 35 MG/DL (ref 6–23)
CALCIUM SERPL-MCNC: 8.6 MG/DL (ref 8.3–10.6)
CALCIUM SERPL-MCNC: 8.6 MG/DL (ref 8.3–10.6)
CHLORIDE BLD-SCNC: 102 MMOL/L (ref 99–110)
CHLORIDE BLD-SCNC: 104 MMOL/L (ref 99–110)
CHOLESTEROL, FASTING: 175 MG/DL
CLARITY: ABNORMAL
CO2: 22 MMOL/L (ref 21–32)
CO2: 22 MMOL/L (ref 21–32)
COLOR: YELLOW
CREAT SERPL-MCNC: 2.5 MG/DL (ref 0.9–1.3)
CREAT SERPL-MCNC: 2.5 MG/DL (ref 0.9–1.3)
CREATININE URINE: 86.5 MG/DL (ref 39–259)
ESTIMATED AVERAGE GLUCOSE: 80 MG/DL
GFR SERPL CREATININE-BSD FRML MDRD: 26 ML/MIN/1.73M2
GFR SERPL CREATININE-BSD FRML MDRD: 26 ML/MIN/1.73M2
GLUCOSE SERPL-MCNC: 117 MG/DL (ref 70–99)
GLUCOSE SERPL-MCNC: 120 MG/DL (ref 70–99)
GLUCOSE, URINE: NEGATIVE MG/DL
HBA1C MFR BLD: 4.4 % (ref 4.2–6.3)
HDLC SERPL-MCNC: 59 MG/DL
KETONES, URINE: NEGATIVE MG/DL
LDLC SERPL CALC-MCNC: 105 MG/DL
LDLC SERPL-MCNC: 112 MG/DL
LEUKOCYTE ESTERASE, URINE: ABNORMAL
MAGNESIUM: 2 MG/DL (ref 1.8–2.4)
NITRITE URINE, QUANTITATIVE: POSITIVE
PH, URINE: 7 (ref 5–8)
PHOSPHORUS: 3.6 MG/DL (ref 2.5–4.9)
POTASSIUM SERPL-SCNC: 5 MMOL/L (ref 3.5–5.1)
POTASSIUM SERPL-SCNC: 5.1 MMOL/L (ref 3.5–5.1)
PROT/CREAT RATIO, UR: 0.6
PROTEIN UA: 30 MG/DL
RBC URINE: 0 /HPF (ref 0–3)
SODIUM BLD-SCNC: 136 MMOL/L (ref 135–145)
SODIUM BLD-SCNC: 139 MMOL/L (ref 135–145)
SPECIFIC GRAVITY UA: 1.01 (ref 1–1.03)
TOTAL PROTEIN: 7.1 GM/DL (ref 6.4–8.2)
TRICHOMONAS: ABNORMAL /HPF
TRIGLYCERIDE, FASTING: 57 MG/DL
URINE TOTAL PROTEIN: 47.6 MG/DL
UROBILINOGEN, URINE: 0.2 MG/DL (ref 0.2–1)
WBC UA: 37 /HPF (ref 0–2)

## 2024-04-03 PROCEDURE — 82570 ASSAY OF URINE CREATININE: CPT

## 2024-04-03 PROCEDURE — 81001 URINALYSIS AUTO W/SCOPE: CPT

## 2024-04-03 PROCEDURE — 80061 LIPID PANEL: CPT

## 2024-04-03 PROCEDURE — 82040 ASSAY OF SERUM ALBUMIN: CPT

## 2024-04-03 PROCEDURE — 83036 HEMOGLOBIN GLYCOSYLATED A1C: CPT

## 2024-04-03 PROCEDURE — 36415 COLL VENOUS BLD VENIPUNCTURE: CPT

## 2024-04-03 PROCEDURE — 84100 ASSAY OF PHOSPHORUS: CPT

## 2024-04-03 PROCEDURE — 84156 ASSAY OF PROTEIN URINE: CPT

## 2024-04-03 PROCEDURE — 83735 ASSAY OF MAGNESIUM: CPT

## 2024-04-03 PROCEDURE — 80048 BASIC METABOLIC PNL TOTAL CA: CPT

## 2024-04-03 PROCEDURE — 80053 COMPREHEN METABOLIC PANEL: CPT

## 2024-04-03 PROCEDURE — 83721 ASSAY OF BLOOD LIPOPROTEIN: CPT

## 2024-05-10 DIAGNOSIS — C61 PROSTATE CANCER (HCC): ICD-10-CM

## 2024-05-10 RX ORDER — ENZALUTAMIDE 80 MG/1
TABLET ORAL
Qty: 60 TABLET | Refills: 4 | Status: SHIPPED | OUTPATIENT
Start: 2024-05-10

## 2024-06-11 DIAGNOSIS — C61 PROSTATE CANCER (HCC): ICD-10-CM

## 2024-06-11 DIAGNOSIS — C79.51 SECONDARY MALIGNANT NEOPLASM OF BONE AND BONE MARROW (HCC): ICD-10-CM

## 2024-06-11 DIAGNOSIS — C79.52 SECONDARY MALIGNANT NEOPLASM OF BONE AND BONE MARROW (HCC): ICD-10-CM

## 2024-06-11 NOTE — TELEPHONE ENCOUNTER
Patient left message requesting a refill for Oxycodone to be sent to [facility]. Pending RX to Provider to be sent to pharmacy.

## 2024-06-12 RX ORDER — OXYCODONE HYDROCHLORIDE 10 MG/1
10 TABLET ORAL DAILY PRN
Qty: 30 TABLET | Refills: 0 | Status: SHIPPED | OUTPATIENT
Start: 2024-06-12 | End: 2024-07-12

## 2024-08-20 DIAGNOSIS — C61 PROSTATE CANCER (HCC): Primary | ICD-10-CM

## 2024-08-20 RX ORDER — OXYCODONE HYDROCHLORIDE 10 MG/1
10 TABLET ORAL DAILY PRN
Qty: 30 TABLET | Refills: 0 | Status: SHIPPED | OUTPATIENT
Start: 2024-08-20 | End: 2024-09-19

## 2024-09-05 ENCOUNTER — HOSPITAL ENCOUNTER (OUTPATIENT)
Dept: INFUSION THERAPY | Age: 74
Discharge: HOME | End: 2024-09-05
Payer: COMMERCIAL

## 2024-09-05 DIAGNOSIS — C79.52 SECONDARY MALIGNANT NEOPLASM OF BONE AND BONE MARROW (HCC): ICD-10-CM

## 2024-09-05 DIAGNOSIS — C61 PROSTATE CANCER (HCC): Primary | ICD-10-CM

## 2024-09-05 DIAGNOSIS — C79.51 SECONDARY MALIGNANT NEOPLASM OF BONE AND BONE MARROW (HCC): ICD-10-CM

## 2024-09-05 LAB
ALBUMIN SERPL-MCNC: 4.1 GM/DL (ref 3.4–5)
ALP BLD-CCNC: 119 IU/L (ref 40–128)
ALT SERPL-CCNC: <5 U/L (ref 10–40)
ANION GAP SERPL CALCULATED.3IONS-SCNC: 14 MMOL/L (ref 7–16)
AST SERPL-CCNC: 12 IU/L (ref 15–37)
BASOPHILS ABSOLUTE: 0 K/CU MM
BASOPHILS RELATIVE PERCENT: 0.1 % (ref 0–1)
BILIRUB SERPL-MCNC: 0.4 MG/DL (ref 0–1)
BUN SERPL-MCNC: 37 MG/DL (ref 6–23)
CALCIUM SERPL-MCNC: 8.7 MG/DL (ref 8.3–10.6)
CHLORIDE BLD-SCNC: 106 MMOL/L (ref 99–110)
CO2: 21 MMOL/L (ref 21–32)
CREAT SERPL-MCNC: 2.6 MG/DL (ref 0.9–1.3)
DIFFERENTIAL TYPE: ABNORMAL
EOSINOPHILS ABSOLUTE: 0.2 K/CU MM
EOSINOPHILS RELATIVE PERCENT: 2.1 % (ref 0–3)
GFR, ESTIMATED: 25 ML/MIN/1.73M2
GLUCOSE SERPL-MCNC: 119 MG/DL (ref 70–99)
HCT VFR BLD CALC: 31.6 % (ref 42–52)
HEMOGLOBIN: 10 GM/DL (ref 13.5–18)
LYMPHOCYTES ABSOLUTE: 2 K/CU MM
LYMPHOCYTES RELATIVE PERCENT: 20.8 % (ref 24–44)
MCH RBC QN AUTO: 32.2 PG (ref 27–31)
MCHC RBC AUTO-ENTMCNC: 31.6 % (ref 32–36)
MCV RBC AUTO: 101.6 FL (ref 78–100)
MONOCYTES ABSOLUTE: 0.6 K/CU MM
MONOCYTES RELATIVE PERCENT: 6.4 % (ref 0–4)
NEUTROPHILS ABSOLUTE: 6.7 K/CU MM
NEUTROPHILS RELATIVE PERCENT: 70.6 % (ref 36–66)
PDW BLD-RTO: 12.9 % (ref 11.7–14.9)
PLATELET # BLD: 215 K/CU MM (ref 140–440)
PMV BLD AUTO: 8.7 FL (ref 7.5–11.1)
POTASSIUM SERPL-SCNC: 4.4 MMOL/L (ref 3.5–5.1)
RBC # BLD: 3.11 M/CU MM (ref 4.6–6.2)
SODIUM BLD-SCNC: 141 MMOL/L (ref 135–145)
TOTAL PROTEIN: 7.4 GM/DL (ref 6.4–8.2)
WBC # BLD: 9.5 K/CU MM (ref 4–10.5)

## 2024-09-05 PROCEDURE — 85025 COMPLETE CBC W/AUTO DIFF WBC: CPT

## 2024-09-05 PROCEDURE — 84154 ASSAY OF PSA FREE: CPT

## 2024-09-05 PROCEDURE — 36415 COLL VENOUS BLD VENIPUNCTURE: CPT

## 2024-09-05 PROCEDURE — 96402 CHEMO HORMON ANTINEOPL SQ/IM: CPT

## 2024-09-05 PROCEDURE — 84153 ASSAY OF PSA TOTAL: CPT

## 2024-09-05 PROCEDURE — 6360000002 HC RX W HCPCS: Performed by: INTERNAL MEDICINE

## 2024-09-05 PROCEDURE — 80053 COMPREHEN METABOLIC PANEL: CPT

## 2024-09-05 RX ADMIN — LEUPROLIDE ACETATE 45 MG: 45 INJECTION, SUSPENSION, EXTENDED RELEASE SUBCUTANEOUS at 14:28

## 2024-09-05 NOTE — PROGRESS NOTES
Pt here for Eligard injection. Pt without any new complaints. Injection given Right mid quadrant. Pt tolerated well, declined d'c paperwork.

## 2024-09-12 ENCOUNTER — HOSPITAL ENCOUNTER (OUTPATIENT)
Dept: INFUSION THERAPY | Age: 74
Discharge: HOME | End: 2024-09-12
Payer: COMMERCIAL

## 2024-09-12 ENCOUNTER — OFFICE VISIT (OUTPATIENT)
Dept: ONCOLOGY | Age: 74
End: 2024-09-12
Payer: COMMERCIAL

## 2024-09-12 VITALS
SYSTOLIC BLOOD PRESSURE: 136 MMHG | HEIGHT: 74 IN | BODY MASS INDEX: 27.46 KG/M2 | HEART RATE: 101 BPM | WEIGHT: 214 LBS | OXYGEN SATURATION: 98 % | DIASTOLIC BLOOD PRESSURE: 72 MMHG | TEMPERATURE: 97.4 F

## 2024-09-12 DIAGNOSIS — C79.51 SECONDARY MALIGNANT NEOPLASM OF BONE AND BONE MARROW (HCC): ICD-10-CM

## 2024-09-12 DIAGNOSIS — D64.9 ANEMIA, UNSPECIFIED TYPE: ICD-10-CM

## 2024-09-12 DIAGNOSIS — C79.52 SECONDARY MALIGNANT NEOPLASM OF BONE AND BONE MARROW (HCC): ICD-10-CM

## 2024-09-12 DIAGNOSIS — C61 PROSTATE CANCER (HCC): Primary | ICD-10-CM

## 2024-09-12 PROCEDURE — 3075F SYST BP GE 130 - 139MM HG: CPT | Performed by: INTERNAL MEDICINE

## 2024-09-12 PROCEDURE — 99214 OFFICE O/P EST MOD 30 MIN: CPT | Performed by: INTERNAL MEDICINE

## 2024-09-12 PROCEDURE — 3078F DIAST BP <80 MM HG: CPT | Performed by: INTERNAL MEDICINE

## 2024-09-12 PROCEDURE — 99211 OFF/OP EST MAY X REQ PHY/QHP: CPT

## 2024-09-12 PROCEDURE — 1123F ACP DISCUSS/DSCN MKR DOCD: CPT | Performed by: INTERNAL MEDICINE

## 2024-09-12 RX ORDER — TAMSULOSIN HYDROCHLORIDE 0.4 MG/1
0.4 CAPSULE ORAL DAILY
Qty: 90 CAPSULE | Refills: 5 | Status: SHIPPED | OUTPATIENT
Start: 2024-09-12 | End: 2026-03-06

## 2024-09-12 RX ORDER — TAMSULOSIN HYDROCHLORIDE 0.4 MG/1
0.4 CAPSULE ORAL DAILY
Qty: 60 CAPSULE | Refills: 5 | Status: SHIPPED | OUTPATIENT
Start: 2024-09-12 | End: 2024-09-12

## 2024-09-16 ENCOUNTER — CLINICAL DOCUMENTATION (OUTPATIENT)
Dept: ONCOLOGY | Age: 74
End: 2024-09-16

## 2024-10-01 ENCOUNTER — HOSPITAL ENCOUNTER (OUTPATIENT)
Age: 74
Discharge: HOME | End: 2024-10-01
Payer: COMMERCIAL

## 2024-10-01 DIAGNOSIS — Z79.4 TYPE 2 DIABETES MELLITUS WITHOUT COMPLICATION, WITH LONG-TERM CURRENT USE OF INSULIN (HCC): Chronic | ICD-10-CM

## 2024-10-01 DIAGNOSIS — E11.9 TYPE 2 DIABETES MELLITUS WITHOUT COMPLICATION, WITH LONG-TERM CURRENT USE OF INSULIN (HCC): Chronic | ICD-10-CM

## 2024-10-01 DIAGNOSIS — E87.5 HYPERKALEMIA: ICD-10-CM

## 2024-10-01 DIAGNOSIS — C61 PROSTATE CANCER (HCC): ICD-10-CM

## 2024-10-01 DIAGNOSIS — I10 ESSENTIAL HYPERTENSION, BENIGN: Chronic | ICD-10-CM

## 2024-10-01 DIAGNOSIS — N18.4 CHRONIC KIDNEY DISEASE, STAGE IV (SEVERE) (HCC): ICD-10-CM

## 2024-10-01 LAB
ALBUMIN SERPL-MCNC: 4 G/DL (ref 3.4–5)
ANION GAP SERPL CALCULATED.3IONS-SCNC: 12 MMOL/L (ref 9–17)
BACTERIA URNS QL MICRO: ABNORMAL
BILIRUB UR QL STRIP: NEGATIVE
BUN SERPL-MCNC: 33 MG/DL (ref 7–20)
CALCIUM SERPL-MCNC: 8.8 MG/DL (ref 8.3–10.6)
CHLORIDE SERPL-SCNC: 105 MMOL/L (ref 99–110)
CLARITY UR: CLEAR
CO2 SERPL-SCNC: 24 MMOL/L (ref 21–32)
COLOR UR: YELLOW
CREAT SERPL-MCNC: 2.4 MG/DL (ref 0.8–1.3)
CREAT UR-MCNC: 46.3 MG/DL (ref 39–259)
EPI CELLS #/AREA URNS HPF: ABNORMAL /HPF
GFR, ESTIMATED: 26 ML/MIN/1.73M2
GLUCOSE SERPL-MCNC: 115 MG/DL (ref 74–99)
GLUCOSE UR STRIP-MCNC: NEGATIVE MG/DL
HGB UR QL STRIP.AUTO: NEGATIVE
KETONES UR STRIP-MCNC: NEGATIVE MG/DL
LEUKOCYTE ESTERASE UR QL STRIP: ABNORMAL
MAGNESIUM SERPL-MCNC: 2.2 MG/DL (ref 1.8–2.4)
MUCOUS THREADS URNS QL MICRO: PRESENT
NITRITE UR QL STRIP: POSITIVE
PH UR STRIP: 6.5 [PH] (ref 5–8)
PHOSPHATE SERPL-MCNC: 3 MG/DL (ref 2.5–4.9)
POTASSIUM SERPL-SCNC: 4.5 MMOL/L (ref 3.5–5.1)
PROT UR STRIP-MCNC: NEGATIVE MG/DL
RBC #/AREA URNS HPF: ABNORMAL /HPF
SODIUM SERPL-SCNC: 141 MMOL/L (ref 136–145)
SP GR UR STRIP: 1.01 (ref 1–1.03)
TOTAL PROTEIN, URINE: 23 MG/DL
URINE TOTAL PROTEIN CREATININE RATIO: 0.49 (ref 0–0.2)
UROBILINOGEN UR STRIP-ACNC: 0.2 EU/DL (ref 0.2–1)
WBC #/AREA URNS HPF: ABNORMAL /HPF

## 2024-10-01 PROCEDURE — 84156 ASSAY OF PROTEIN URINE: CPT

## 2024-10-01 PROCEDURE — 80048 BASIC METABOLIC PNL TOTAL CA: CPT

## 2024-10-01 PROCEDURE — 82570 ASSAY OF URINE CREATININE: CPT

## 2024-10-01 PROCEDURE — 36415 COLL VENOUS BLD VENIPUNCTURE: CPT

## 2024-10-01 PROCEDURE — 81001 URINALYSIS AUTO W/SCOPE: CPT

## 2024-10-01 PROCEDURE — 84100 ASSAY OF PHOSPHORUS: CPT

## 2024-10-01 PROCEDURE — 83735 ASSAY OF MAGNESIUM: CPT

## 2024-10-01 PROCEDURE — 82040 ASSAY OF SERUM ALBUMIN: CPT

## 2024-10-29 DIAGNOSIS — C61 PROSTATE CANCER (HCC): ICD-10-CM

## 2024-10-29 RX ORDER — OXYCODONE HYDROCHLORIDE 10 MG/1
10 TABLET ORAL DAILY PRN
Qty: 30 TABLET | Refills: 0 | Status: SHIPPED | OUTPATIENT
Start: 2024-10-29 | End: 2024-11-28

## 2024-10-29 NOTE — TELEPHONE ENCOUNTER
Pt called for a refill of his OXYCODONE 10 MG; Dr. Krishnan is out of the office. Refill pended to Dr. Toledo who is covering.

## 2024-12-19 DIAGNOSIS — C61 PROSTATE CANCER (HCC): ICD-10-CM

## 2024-12-19 RX ORDER — OXYCODONE HYDROCHLORIDE 10 MG/1
10 TABLET ORAL DAILY PRN
Qty: 30 TABLET | Refills: 0 | Status: SHIPPED | OUTPATIENT
Start: 2024-12-19 | End: 2025-01-18

## 2024-12-19 NOTE — TELEPHONE ENCOUNTER
Patient left message requesting a refill for Diclofenac Sodium and Oxycodone to be sent to [facility] [address]. Pending RX to Provider to be sent to pharmacy.

## 2024-12-23 ENCOUNTER — CLINICAL DOCUMENTATION (OUTPATIENT)
Dept: ONCOLOGY | Age: 74
End: 2024-12-23

## 2024-12-23 NOTE — PROGRESS NOTES
Patient Assistance  Vasiliy was approved for copay assistance through [employer] for prostate cancer.  This will help with his OOP costs of Eligard.    Denise Marcelo  Financial Navigator    Navigator Type: Infusion  Documentation Type: Assistance Review  Contact Type: Telephone  Status of Patient Insurance Coverage: Patient has active coverage              Drug Name: OTHER  Other Drug Name: eligard  Form of PAP Assistance: Copay / [employer] (Approved)

## 2025-01-02 DIAGNOSIS — C61 PROSTATE CANCER (HCC): ICD-10-CM

## 2025-01-02 RX ORDER — ENZALUTAMIDE 80 MG/1
160 TABLET ORAL DAILY
Qty: 60 TABLET | Refills: 4 | Status: SHIPPED | OUTPATIENT
Start: 2025-01-02

## 2025-01-03 ENCOUNTER — CLINICAL DOCUMENTATION (OUTPATIENT)
Dept: ONCOLOGY | Age: 75
End: 2025-01-03

## 2025-01-03 RX ORDER — PREDNISONE 20 MG/1
TABLET ORAL
Qty: 42 TABLET | Refills: 0 | Status: SHIPPED | OUTPATIENT
Start: 2025-01-03 | End: 2025-01-03

## 2025-01-03 RX ORDER — PREDNISONE 20 MG/1
TABLET ORAL
Qty: 42 TABLET | Refills: 0 | Status: SHIPPED | OUTPATIENT
Start: 2025-01-03

## 2025-01-03 NOTE — PROGRESS NOTES
RX for steroid e-scribed to [facility] on [address] with below instructions per physician order:     Prednisone 20 mg - Take 3 tablets (60 mg) by mouth daily for 7 days, then take 2 tablets (40 mg) by mouth daily for 7 days then take 1 tablet (20 mg) by mouth daily for 7 days #42

## 2025-01-07 NOTE — PROGRESS NOTES
Order for steroid placed on wrong patient. RX discontinued. Called patient @ [phone] to notify and confirmed that patient did not start taking medication. Patient inquiring about xtandi and states that he has not heard from specialty pharmacy. Advised that this RN will contact [facility] to inquire about status and f/u with patient. Voices understanding. No further needs addressed at this time.

## 2025-01-15 RX ORDER — ENZALUTAMIDE 40 MG/1
160 CAPSULE ORAL DAILY
Qty: 120 CAPSULE | Refills: 4 | Status: ACTIVE | OUTPATIENT
Start: 2025-01-15

## 2025-01-15 NOTE — PROGRESS NOTES
Xtandi 80 mg RX dc'd and new rx for 40 mg- 4 tablets daily e-scribed due to the specialty pharmacy being out of stock of the 80 mg tablets.

## 2025-01-16 ENCOUNTER — TELEPHONE (OUTPATIENT)
Dept: ONCOLOGY | Age: 75
End: 2025-01-16

## 2025-01-16 NOTE — TELEPHONE ENCOUNTER
Pt called regarding RX for Xtandi. Informed pt it was sent yesterday 1/15/2025 to [facility]  and we received confirmation. Pt stated they are saying they do not have script. I called [email or web address]  and they stated they transferred RX to [email or web address] (PH #. [phone]) and they received conformation. Called pt back to inform him of this and gave him [facility] phone number if he does not hear anything to give then a call. Pt voiced understanding.

## 2025-02-19 DIAGNOSIS — C61 PROSTATE CANCER (HCC): ICD-10-CM

## 2025-02-19 RX ORDER — OXYCODONE HYDROCHLORIDE 10 MG/1
10 TABLET ORAL DAILY PRN
Qty: 30 TABLET | Refills: 0 | Status: SHIPPED | OUTPATIENT
Start: 2025-02-19 | End: 2025-03-21

## 2025-02-19 NOTE — TELEPHONE ENCOUNTER
Patient left message requesting a refill for Oxycodone to be sent to [facility] on [address] Pending RX to Provider to be sent to pharmacy.

## 2025-03-06 ENCOUNTER — HOSPITAL ENCOUNTER (OUTPATIENT)
Dept: INFUSION THERAPY | Age: 75
Discharge: HOME | End: 2025-03-06
Payer: COMMERCIAL

## 2025-03-06 DIAGNOSIS — C79.52 SECONDARY MALIGNANT NEOPLASM OF BONE AND BONE MARROW (HCC): ICD-10-CM

## 2025-03-06 DIAGNOSIS — C79.51 SECONDARY MALIGNANT NEOPLASM OF BONE AND BONE MARROW (HCC): ICD-10-CM

## 2025-03-06 DIAGNOSIS — D64.9 ANEMIA, UNSPECIFIED TYPE: ICD-10-CM

## 2025-03-06 DIAGNOSIS — C61 PROSTATE CANCER (HCC): Primary | ICD-10-CM

## 2025-03-06 DIAGNOSIS — C61 PROSTATE CANCER (HCC): ICD-10-CM

## 2025-03-06 LAB
ALBUMIN SERPL-MCNC: 3.8 G/DL (ref 3.4–5)
ALBUMIN/GLOB SERPL: 1.1 {RATIO} (ref 1.1–2.2)
ALP SERPL-CCNC: 144 U/L (ref 40–129)
ALT SERPL-CCNC: <5 U/L (ref 10–40)
ANION GAP SERPL CALCULATED.3IONS-SCNC: 11 MMOL/L (ref 9–17)
AST SERPL-CCNC: 16 U/L (ref 15–37)
BASOPHILS # BLD: 0.02 K/UL
BASOPHILS NFR BLD: 0 % (ref 0–1)
BILIRUB SERPL-MCNC: 0.5 MG/DL (ref 0–1)
BUN SERPL-MCNC: 34 MG/DL (ref 7–20)
CALCIUM SERPL-MCNC: 9 MG/DL (ref 8.3–10.6)
CHLORIDE SERPL-SCNC: 106 MMOL/L (ref 99–110)
CO2 SERPL-SCNC: 22 MMOL/L (ref 21–32)
CREAT SERPL-MCNC: 2.4 MG/DL (ref 0.8–1.3)
EOSINOPHIL # BLD: 0.16 K/UL
EOSINOPHILS RELATIVE PERCENT: 2 % (ref 0–3)
ERYTHROCYTE [DISTWIDTH] IN BLOOD BY AUTOMATED COUNT: 13.2 % (ref 11.7–14.9)
FERRITIN SERPL-MCNC: 145 NG/ML (ref 30–400)
FOLATE SERPL-MCNC: 4.1 NG/ML (ref 4.8–24.2)
GFR, ESTIMATED: 26 ML/MIN/1.73M2
GLUCOSE SERPL-MCNC: 108 MG/DL (ref 74–99)
HAPTOGLOB SERPL-MCNC: 132 MG/DL (ref 30–200)
HCT VFR BLD AUTO: 32.7 % (ref 42–52)
HGB BLD-MCNC: 10.2 G/DL (ref 13.5–18)
IRON SATN MFR SERPL: 15 % (ref 15–50)
IRON SERPL-MCNC: 60 UG/DL (ref 59–158)
LYMPHOCYTES NFR BLD: 1.45 K/UL
LYMPHOCYTES RELATIVE PERCENT: 17 % (ref 24–44)
MCH RBC QN AUTO: 32.9 PG (ref 27–31)
MCHC RBC AUTO-ENTMCNC: 31.2 G/DL (ref 32–36)
MCV RBC AUTO: 105.5 FL (ref 78–100)
MONOCYTES NFR BLD: 0.53 K/UL
MONOCYTES NFR BLD: 6 % (ref 0–4)
NEUTROPHILS NFR BLD: 75 % (ref 36–66)
NEUTS SEG NFR BLD: 6.54 K/UL
PLATELET # BLD AUTO: 242 K/UL (ref 140–440)
PMV BLD AUTO: 8.7 FL (ref 7.5–11.1)
POTASSIUM SERPL-SCNC: 5.1 MMOL/L (ref 3.5–5.1)
PROT SERPL-MCNC: 7.1 G/DL (ref 6.4–8.2)
RBC # BLD AUTO: 3.1 M/UL (ref 4.6–6.2)
RETICS # AUTO: 0.09 M/UL
RETICS/RBC NFR AUTO: 2.8 % (ref 0.2–2)
SODIUM SERPL-SCNC: 139 MMOL/L (ref 136–145)
TIBC SERPL-MCNC: 395 UG/DL (ref 260–445)
TSH SERPL DL<=0.05 MIU/L-ACNC: 6.13 UIU/ML (ref 0.27–4.2)
UNSATURATED IRON BINDING CAPACITY: 335 UG/DL (ref 110–370)
VIT B12 SERPL-MCNC: 462 PG/ML (ref 211–911)
WBC OTHER # BLD: 8.7 K/UL (ref 4–10.5)

## 2025-03-06 PROCEDURE — 36415 COLL VENOUS BLD VENIPUNCTURE: CPT

## 2025-03-06 PROCEDURE — 84443 ASSAY THYROID STIM HORMONE: CPT

## 2025-03-06 PROCEDURE — 83540 ASSAY OF IRON: CPT

## 2025-03-06 PROCEDURE — 85025 COMPLETE CBC W/AUTO DIFF WBC: CPT

## 2025-03-06 PROCEDURE — 84153 ASSAY OF PSA TOTAL: CPT

## 2025-03-06 PROCEDURE — 82728 ASSAY OF FERRITIN: CPT

## 2025-03-06 PROCEDURE — 84154 ASSAY OF PSA FREE: CPT

## 2025-03-06 PROCEDURE — 96402 CHEMO HORMON ANTINEOPL SQ/IM: CPT

## 2025-03-06 PROCEDURE — 80053 COMPREHEN METABOLIC PANEL: CPT

## 2025-03-06 PROCEDURE — 82607 VITAMIN B-12: CPT

## 2025-03-06 PROCEDURE — 83010 ASSAY OF HAPTOGLOBIN QUANT: CPT

## 2025-03-06 PROCEDURE — 85045 AUTOMATED RETICULOCYTE COUNT: CPT

## 2025-03-06 PROCEDURE — 84155 ASSAY OF PROTEIN SERUM: CPT

## 2025-03-06 PROCEDURE — 82746 ASSAY OF FOLIC ACID SERUM: CPT

## 2025-03-06 PROCEDURE — 83550 IRON BINDING TEST: CPT

## 2025-03-06 PROCEDURE — 6360000002 HC RX W HCPCS: Performed by: INTERNAL MEDICINE

## 2025-03-06 PROCEDURE — 84165 PROTEIN E-PHORESIS SERUM: CPT

## 2025-03-06 RX ADMIN — LEUPROLIDE ACETATE 45 MG: 45 INJECTION, SUSPENSION, EXTENDED RELEASE SUBCUTANEOUS at 14:20

## 2025-03-06 NOTE — PROGRESS NOTES
Pt to tx suite for Lupron injection. Pt has no concerns or issues to discuss at this time. Injection given SQ to LUQ, cold spray utilized. Pt tolerated without incident left ambulatory discharge instructions given

## 2025-03-15 LAB
MISCELLANEOUS LAB TEST RESULT: ABNORMAL
TEST NAME: ABNORMAL

## 2025-04-09 ENCOUNTER — HOSPITAL ENCOUNTER (OUTPATIENT)
Dept: INFUSION THERAPY | Age: 75
Setting detail: INFUSION SERIES
Discharge: HOME | End: 2025-04-09
Payer: COMMERCIAL

## 2025-04-09 VITALS
DIASTOLIC BLOOD PRESSURE: 91 MMHG | SYSTOLIC BLOOD PRESSURE: 118 MMHG | OXYGEN SATURATION: 100 % | RESPIRATION RATE: 16 BRPM | HEART RATE: 65 BPM | TEMPERATURE: 97.3 F

## 2025-04-09 PROCEDURE — P9016 RBC LEUKOCYTES REDUCED: HCPCS

## 2025-04-09 PROCEDURE — 36430 TRANSFUSION BLD/BLD COMPNT: CPT

## 2025-04-09 PROCEDURE — 2580000003 HC RX 258: Performed by: FAMILY MEDICINE

## 2025-04-09 PROCEDURE — 86920 COMPATIBILITY TEST SPIN: CPT

## 2025-04-09 PROCEDURE — 86900 BLOOD TYPING SEROLOGIC ABO: CPT

## 2025-04-09 PROCEDURE — 2500000003 HC RX 250 WO HCPCS: Performed by: FAMILY MEDICINE

## 2025-04-09 PROCEDURE — 36415 COLL VENOUS BLD VENIPUNCTURE: CPT

## 2025-04-09 PROCEDURE — 86901 BLOOD TYPING SEROLOGIC RH(D): CPT

## 2025-04-09 PROCEDURE — 86850 RBC ANTIBODY SCREEN: CPT

## 2025-04-09 RX ORDER — SODIUM CHLORIDE 0.9 % (FLUSH) 0.9 %
5-40 SYRINGE (ML) INJECTION 2 TIMES DAILY
Status: DISCONTINUED | OUTPATIENT
Start: 2025-04-09 | End: 2025-04-10 | Stop reason: HOSPADM

## 2025-04-09 RX ORDER — SODIUM CHLORIDE 9 MG/ML
INJECTION, SOLUTION INTRAVENOUS PRN
Status: COMPLETED | OUTPATIENT
Start: 2025-04-09 | End: 2025-04-09

## 2025-04-09 RX ADMIN — SODIUM CHLORIDE: 0.9 INJECTION, SOLUTION INTRAVENOUS at 10:27

## 2025-04-09 RX ADMIN — SODIUM CHLORIDE, PRESERVATIVE FREE 10 ML: 5 INJECTION INTRAVENOUS at 09:00

## 2025-04-09 RX ADMIN — SODIUM CHLORIDE, PRESERVATIVE FREE 10 ML: 5 INJECTION INTRAVENOUS at 12:25

## 2025-04-09 NOTE — DISCHARGE INSTRUCTIONS
Call the doctor or if:   You are weaker than normal   Develop a fever, hives, rash, or chills   Notice a yellow tint to the skin or eyes   Have blood in your stools or urine     Seek immediate care (Emergency Room) if you experience:   Chest pain   Shortness of breath    Continue with your medications, diet, and activity    Thank you for choosing [facility] Out[facility]fusion [facility],    Outpatient Infusion Unit  Hours: 8:00am-4:30pm  Phone: [phone]

## 2025-04-09 NOTE — PROGRESS NOTES
Pt taken to room 04 for blood transfusion. Pt oriented to room, call light, bed/chair controls, TV, pt voiced understanding.  Plan of care explained to pt, pt voiced understanding.

## 2025-04-09 NOTE — DISCHARGE SUMMARY
Tolerated Transfusion well.Reviewed discharge instructions, understanding verbalized.Copies of AVS given to take home. Patient discharged  with Transportation to [facility].Down to exit per wheelchair.    Orders Placed This Encounter   Medications    0.9 % sodium chloride infusion    sodium chloride flush 0.9 % injection 5-40 mL

## 2025-04-10 LAB
ABO/RH: NORMAL
ANTIBODY SCREEN: NEGATIVE
BLOOD BANK BLOOD PRODUCT EXPIRATION DATE: NORMAL
BLOOD BANK DISPENSE STATUS: NORMAL
BLOOD BANK ISBT PRODUCT BLOOD TYPE: 5100
BLOOD BANK PRODUCT CODE: NORMAL
BLOOD BANK SAMPLE EXPIRATION: NORMAL
BLOOD BANK UNIT TYPE AND RH: NORMAL
BPU ID: NORMAL
COMPONENT: NORMAL
CROSSMATCH RESULT: NORMAL
TRANSFUSION STATUS: NORMAL
UNIT DIVISION: 0
UNIT ISSUE DATE/TIME: NORMAL

## 2025-05-20 DIAGNOSIS — C61 PROSTATE CANCER (HCC): ICD-10-CM

## 2025-05-20 RX ORDER — OXYCODONE HYDROCHLORIDE 10 MG/1
10 TABLET ORAL DAILY PRN
Qty: 30 TABLET | Refills: 0 | Status: SHIPPED | OUTPATIENT
Start: 2025-05-20 | End: 2025-06-19

## 2025-05-20 NOTE — TELEPHONE ENCOUNTER
Pt called requesting refill of Oxycodone. States he was recently in the hospital and just got released from assisted living for rehab. Informed him  was out of the office but I would check with one of the other providers.  agreeable to send in script for pt. Called and informed pt of this, pt voiced understanding.

## 2025-07-02 DIAGNOSIS — C61 PROSTATE CANCER (HCC): ICD-10-CM

## 2025-07-02 RX ORDER — OXYCODONE HYDROCHLORIDE 10 MG/1
10 TABLET ORAL DAILY PRN
Qty: 30 TABLET | Refills: 0 | Status: SHIPPED | OUTPATIENT
Start: 2025-07-02 | End: 2025-08-01

## 2025-07-02 NOTE — TELEPHONE ENCOUNTER
Patient left message requesting a refill for Oxycodone 10mg to be sent to [facility] on [address]. Pending RX to Provider to be sent to pharmacy.

## 2025-07-15 ENCOUNTER — HOSPITAL ENCOUNTER (OUTPATIENT)
Age: 75
Discharge: HOME | End: 2025-07-15
Payer: COMMERCIAL

## 2025-07-15 DIAGNOSIS — Z79.4 TYPE 2 DIABETES MELLITUS WITHOUT COMPLICATION, WITH LONG-TERM CURRENT USE OF INSULIN (HCC): Chronic | ICD-10-CM

## 2025-07-15 DIAGNOSIS — N18.4 CHRONIC KIDNEY DISEASE, STAGE IV (SEVERE) (HCC): ICD-10-CM

## 2025-07-15 DIAGNOSIS — I10 ESSENTIAL HYPERTENSION, BENIGN: Chronic | ICD-10-CM

## 2025-07-15 DIAGNOSIS — E11.9 TYPE 2 DIABETES MELLITUS WITHOUT COMPLICATION, WITH LONG-TERM CURRENT USE OF INSULIN (HCC): Chronic | ICD-10-CM

## 2025-07-15 DIAGNOSIS — E43 SEVERE MALNUTRITION: Chronic | ICD-10-CM

## 2025-07-15 DIAGNOSIS — C61 PROSTATE CANCER (HCC): ICD-10-CM

## 2025-07-15 LAB
ALBUMIN SERPL-MCNC: 3.7 G/DL (ref 3.4–5)
ANION GAP SERPL CALCULATED.3IONS-SCNC: 9 MMOL/L (ref 9–17)
BACTERIA URNS QL MICRO: ABNORMAL
BILIRUB UR QL STRIP: NEGATIVE
BUN SERPL-MCNC: 42 MG/DL (ref 7–20)
CALCIUM SERPL-MCNC: 8.8 MG/DL (ref 8.3–10.6)
CHLORIDE SERPL-SCNC: 108 MMOL/L (ref 99–110)
CLARITY UR: CLEAR
CO2 SERPL-SCNC: 21 MMOL/L (ref 21–32)
COLOR UR: YELLOW
CREAT SERPL-MCNC: 2.8 MG/DL (ref 0.8–1.3)
CREAT UR-MCNC: 142 MG/DL (ref 39–259)
EPI CELLS #/AREA URNS HPF: <1 /HPF
GFR, ESTIMATED: 22 ML/MIN/1.73M2
GLUCOSE SERPL-MCNC: 96 MG/DL (ref 74–99)
GLUCOSE UR STRIP-MCNC: NEGATIVE MG/DL
HGB UR QL STRIP.AUTO: ABNORMAL
KETONES UR STRIP-MCNC: NEGATIVE MG/DL
LEUKOCYTE ESTERASE UR QL STRIP: ABNORMAL
MAGNESIUM SERPL-MCNC: 2.2 MG/DL (ref 1.8–2.4)
MUCOUS THREADS URNS QL MICRO: ABNORMAL
NITRITE UR QL STRIP: NEGATIVE
PH UR STRIP: 7.5 [PH] (ref 5–8)
PHOSPHATE SERPL-MCNC: 3.5 MG/DL (ref 2.5–4.9)
POTASSIUM SERPL-SCNC: 4.8 MMOL/L (ref 3.5–5.1)
PROT UR STRIP-MCNC: 100 MG/DL
RBC #/AREA URNS HPF: 9 /HPF (ref 0–2)
SODIUM SERPL-SCNC: 138 MMOL/L (ref 136–145)
SP GR UR STRIP: 1.01 (ref 1–1.03)
TOTAL PROTEIN, URINE: 90 MG/DL
URINE TOTAL PROTEIN CREATININE RATIO: 0.63 (ref 0–0.2)
UROBILINOGEN UR STRIP-ACNC: 0.2 EU/DL (ref 0–1)
WBC #/AREA URNS HPF: 87 /HPF (ref 0–5)

## 2025-07-15 PROCEDURE — 80048 BASIC METABOLIC PNL TOTAL CA: CPT

## 2025-07-15 PROCEDURE — 82040 ASSAY OF SERUM ALBUMIN: CPT

## 2025-07-15 PROCEDURE — 84100 ASSAY OF PHOSPHORUS: CPT

## 2025-07-15 PROCEDURE — 83735 ASSAY OF MAGNESIUM: CPT

## 2025-07-15 PROCEDURE — 82570 ASSAY OF URINE CREATININE: CPT

## 2025-07-15 PROCEDURE — 84156 ASSAY OF PROTEIN URINE: CPT

## 2025-07-15 PROCEDURE — 81001 URINALYSIS AUTO W/SCOPE: CPT

## 2025-07-22 PROBLEM — C79.52 SECONDARY MALIGNANT NEOPLASM OF BONE AND BONE MARROW (HCC): Status: RESOLVED | Noted: 2021-01-07 | Resolved: 2025-07-22

## 2025-07-22 PROBLEM — C79.51 SECONDARY MALIGNANT NEOPLASM OF BONE AND BONE MARROW (HCC): Status: RESOLVED | Noted: 2021-01-07 | Resolved: 2025-07-22

## 2025-07-30 RX ORDER — ENZALUTAMIDE 40 MG/1
TABLET ORAL
Qty: 120 TABLET | Refills: 3 | Status: ACTIVE | OUTPATIENT
Start: 2025-07-30

## 2025-08-22 DIAGNOSIS — C61 PROSTATE CANCER (HCC): ICD-10-CM

## 2025-08-22 RX ORDER — OXYCODONE HYDROCHLORIDE 10 MG/1
10 TABLET ORAL DAILY PRN
Qty: 30 TABLET | Refills: 0 | Status: SHIPPED | OUTPATIENT
Start: 2025-08-22 | End: 2025-09-21

## 2025-08-28 ENCOUNTER — HOSPITAL ENCOUNTER (OUTPATIENT)
Dept: INFUSION THERAPY | Age: 75
Discharge: HOME | End: 2025-08-28
Payer: COMMERCIAL

## 2025-08-28 DIAGNOSIS — C61 PROSTATE CANCER (HCC): Primary | ICD-10-CM

## 2025-08-28 DIAGNOSIS — C61 PROSTATE CANCER (HCC): ICD-10-CM

## 2025-08-28 LAB
ALBUMIN SERPL-MCNC: 3.8 G/DL (ref 3.4–5)
ALBUMIN/GLOB SERPL: 1.1 {RATIO} (ref 1.1–2.2)
ALP SERPL-CCNC: 117 U/L (ref 40–129)
ALT SERPL-CCNC: 9 U/L (ref 10–40)
ANION GAP SERPL CALCULATED.3IONS-SCNC: 11 MMOL/L (ref 9–17)
AST SERPL-CCNC: 19 U/L (ref 15–37)
BASOPHILS # BLD: 0.02 K/UL
BASOPHILS NFR BLD: 0 % (ref 0–1)
BILIRUB SERPL-MCNC: 0.4 MG/DL (ref 0–1)
BUN SERPL-MCNC: 47 MG/DL (ref 7–20)
CALCIUM SERPL-MCNC: 8.6 MG/DL (ref 8.3–10.6)
CHLORIDE SERPL-SCNC: 108 MMOL/L (ref 99–110)
CO2 SERPL-SCNC: 21 MMOL/L (ref 21–32)
CREAT SERPL-MCNC: 2.6 MG/DL (ref 0.8–1.3)
EOSINOPHIL # BLD: 0.25 K/UL
EOSINOPHILS RELATIVE PERCENT: 3 % (ref 0–3)
ERYTHROCYTE [DISTWIDTH] IN BLOOD BY AUTOMATED COUNT: 13.1 % (ref 11.7–14.9)
GFR, ESTIMATED: 24 ML/MIN/1.73M2
GLUCOSE SERPL-MCNC: 81 MG/DL (ref 74–99)
HCT VFR BLD AUTO: 32 % (ref 42–52)
HGB BLD-MCNC: 9.8 G/DL (ref 13.5–18)
LYMPHOCYTES NFR BLD: 1.95 K/UL
LYMPHOCYTES RELATIVE PERCENT: 23 % (ref 24–44)
MCH RBC QN AUTO: 32 PG (ref 27–31)
MCHC RBC AUTO-ENTMCNC: 30.6 G/DL (ref 32–36)
MCV RBC AUTO: 104.6 FL (ref 78–100)
MONOCYTES NFR BLD: 0.5 K/UL
MONOCYTES NFR BLD: 6 % (ref 0–5)
NEUTROPHILS NFR BLD: 68 % (ref 36–66)
NEUTS SEG NFR BLD: 5.81 K/UL
PLATELET # BLD AUTO: 244 K/UL (ref 140–440)
PMV BLD AUTO: 9.7 FL (ref 7.5–11.1)
POTASSIUM SERPL-SCNC: 5 MMOL/L (ref 3.5–5.1)
PROT SERPL-MCNC: 7.1 G/DL (ref 6.4–8.2)
RBC # BLD AUTO: 3.06 M/UL (ref 4.6–6.2)
SODIUM SERPL-SCNC: 140 MMOL/L (ref 136–145)
WBC OTHER # BLD: 8.5 K/UL (ref 4–10.5)

## 2025-08-28 PROCEDURE — 84154 ASSAY OF PSA FREE: CPT

## 2025-08-28 PROCEDURE — 36415 COLL VENOUS BLD VENIPUNCTURE: CPT

## 2025-08-28 PROCEDURE — 84153 ASSAY OF PSA TOTAL: CPT

## 2025-08-28 PROCEDURE — 85025 COMPLETE CBC W/AUTO DIFF WBC: CPT

## 2025-08-28 PROCEDURE — 80053 COMPREHEN METABOLIC PANEL: CPT

## 2025-09-05 ENCOUNTER — OFFICE VISIT (OUTPATIENT)
Dept: ONCOLOGY | Age: 75
End: 2025-09-05
Payer: COMMERCIAL

## 2025-09-05 ENCOUNTER — HOSPITAL ENCOUNTER (OUTPATIENT)
Dept: INFUSION THERAPY | Age: 75
Discharge: HOME | End: 2025-09-05
Payer: COMMERCIAL

## 2025-09-05 VITALS
OXYGEN SATURATION: 100 % | SYSTOLIC BLOOD PRESSURE: 197 MMHG | BODY MASS INDEX: 26.44 KG/M2 | HEIGHT: 74 IN | WEIGHT: 206 LBS | TEMPERATURE: 97.8 F | HEART RATE: 73 BPM | DIASTOLIC BLOOD PRESSURE: 93 MMHG

## 2025-09-05 DIAGNOSIS — C61 PROSTATE CANCER (HCC): Primary | ICD-10-CM

## 2025-09-05 DIAGNOSIS — D64.9 ANEMIA, UNSPECIFIED TYPE: ICD-10-CM

## 2025-09-05 PROCEDURE — 1125F AMNT PAIN NOTED PAIN PRSNT: CPT | Performed by: INTERNAL MEDICINE

## 2025-09-05 PROCEDURE — 1159F MED LIST DOCD IN RCRD: CPT | Performed by: INTERNAL MEDICINE

## 2025-09-05 PROCEDURE — 96402 CHEMO HORMON ANTINEOPL SQ/IM: CPT

## 2025-09-05 PROCEDURE — 1123F ACP DISCUSS/DSCN MKR DOCD: CPT | Performed by: INTERNAL MEDICINE

## 2025-09-05 PROCEDURE — 3080F DIAST BP >= 90 MM HG: CPT | Performed by: INTERNAL MEDICINE

## 2025-09-05 PROCEDURE — 3077F SYST BP >= 140 MM HG: CPT | Performed by: INTERNAL MEDICINE

## 2025-09-05 PROCEDURE — 99214 OFFICE O/P EST MOD 30 MIN: CPT | Performed by: INTERNAL MEDICINE

## 2025-09-05 PROCEDURE — 6360000002 HC RX W HCPCS: Performed by: NURSE PRACTITIONER

## 2025-09-05 RX ORDER — ENZALUTAMIDE 80 MG/1
2 TABLET ORAL DAILY
Qty: 60 TABLET | Refills: 3 | Status: ACTIVE | OUTPATIENT
Start: 2025-09-05

## 2025-09-05 RX ADMIN — LEUPROLIDE ACETATE 45 MG: 45 INJECTION, SUSPENSION, EXTENDED RELEASE SUBCUTANEOUS at 12:05

## 2025-09-05 ASSESSMENT — PATIENT HEALTH QUESTIONNAIRE - PHQ9
SUM OF ALL RESPONSES TO PHQ QUESTIONS 1-9: 0
2. FEELING DOWN, DEPRESSED OR HOPELESS: NOT AT ALL
1. LITTLE INTEREST OR PLEASURE IN DOING THINGS: NOT AT ALL